# Patient Record
Sex: FEMALE | Race: BLACK OR AFRICAN AMERICAN | NOT HISPANIC OR LATINO | Employment: FULL TIME | URBAN - METROPOLITAN AREA
[De-identification: names, ages, dates, MRNs, and addresses within clinical notes are randomized per-mention and may not be internally consistent; named-entity substitution may affect disease eponyms.]

---

## 2020-10-14 ENCOUNTER — HOSPITAL ENCOUNTER (EMERGENCY)
Facility: HOSPITAL | Age: 30
Discharge: HOME | End: 2020-10-14
Attending: EMERGENCY MEDICINE
Payer: MEDICAID

## 2020-10-14 VITALS
RESPIRATION RATE: 16 BRPM | SYSTOLIC BLOOD PRESSURE: 119 MMHG | HEART RATE: 66 BPM | HEIGHT: 66 IN | DIASTOLIC BLOOD PRESSURE: 67 MMHG | WEIGHT: 183 LBS | BODY MASS INDEX: 29.41 KG/M2 | TEMPERATURE: 99 F | OXYGEN SATURATION: 100 %

## 2020-10-14 DIAGNOSIS — R10.13 EPIGASTRIC ABDOMINAL PAIN: Primary | ICD-10-CM

## 2020-10-14 LAB
ALBUMIN SERPL BCP-MCNC: 4 G/DL (ref 3.5–5.2)
ALP SERPL-CCNC: 56 U/L (ref 55–135)
ALT SERPL W/O P-5'-P-CCNC: 15 U/L (ref 10–44)
ANION GAP SERPL CALC-SCNC: 9 MMOL/L (ref 8–16)
AST SERPL-CCNC: 16 U/L (ref 10–40)
B-HCG UR QL: NEGATIVE
BASOPHILS # BLD AUTO: 0.06 K/UL (ref 0–0.2)
BASOPHILS NFR BLD: 1 % (ref 0–1.9)
BILIRUB SERPL-MCNC: 0.7 MG/DL (ref 0.1–1)
BILIRUB UR QL STRIP: NEGATIVE
BUN SERPL-MCNC: 7 MG/DL (ref 6–20)
CALCIUM SERPL-MCNC: 8.9 MG/DL (ref 8.7–10.5)
CHLORIDE SERPL-SCNC: 105 MMOL/L (ref 95–110)
CLARITY UR: ABNORMAL
CO2 SERPL-SCNC: 24 MMOL/L (ref 23–29)
COLOR UR: YELLOW
CREAT SERPL-MCNC: 0.7 MG/DL (ref 0.5–1.4)
CTP QC/QA: YES
DIFFERENTIAL METHOD: ABNORMAL
EOSINOPHIL # BLD AUTO: 0.2 K/UL (ref 0–0.5)
EOSINOPHIL NFR BLD: 3.7 % (ref 0–8)
ERYTHROCYTE [DISTWIDTH] IN BLOOD BY AUTOMATED COUNT: 11.5 % (ref 11.5–14.5)
EST. GFR  (AFRICAN AMERICAN): >60 ML/MIN/1.73 M^2
EST. GFR  (NON AFRICAN AMERICAN): >60 ML/MIN/1.73 M^2
GLUCOSE SERPL-MCNC: 89 MG/DL (ref 70–110)
GLUCOSE UR QL STRIP: NEGATIVE
HCT VFR BLD AUTO: 39.8 % (ref 37–48.5)
HGB BLD-MCNC: 13.2 G/DL (ref 12–16)
HGB UR QL STRIP: NEGATIVE
IMM GRANULOCYTES # BLD AUTO: 0.02 K/UL (ref 0–0.04)
IMM GRANULOCYTES NFR BLD AUTO: 0.3 % (ref 0–0.5)
KETONES UR QL STRIP: NEGATIVE
LEUKOCYTE ESTERASE UR QL STRIP: NEGATIVE
LIPASE SERPL-CCNC: 24 U/L (ref 4–60)
LYMPHOCYTES # BLD AUTO: 2.3 K/UL (ref 1–4.8)
LYMPHOCYTES NFR BLD: 37.6 % (ref 18–48)
MCH RBC QN AUTO: 32.7 PG (ref 27–31)
MCHC RBC AUTO-ENTMCNC: 33.2 G/DL (ref 32–36)
MCV RBC AUTO: 99 FL (ref 82–98)
MONOCYTES # BLD AUTO: 0.3 K/UL (ref 0.3–1)
MONOCYTES NFR BLD: 5.5 % (ref 4–15)
NEUTROPHILS # BLD AUTO: 3.2 K/UL (ref 1.8–7.7)
NEUTROPHILS NFR BLD: 51.9 % (ref 38–73)
NITRITE UR QL STRIP: NEGATIVE
NRBC BLD-RTO: 0 /100 WBC
PH UR STRIP: 8 [PH] (ref 5–8)
PLATELET # BLD AUTO: 286 K/UL (ref 150–350)
PMV BLD AUTO: 9.7 FL (ref 9.2–12.9)
POTASSIUM SERPL-SCNC: 4.1 MMOL/L (ref 3.5–5.1)
PROT SERPL-MCNC: 7.5 G/DL (ref 6–8.4)
PROT UR QL STRIP: NEGATIVE
RBC # BLD AUTO: 4.04 M/UL (ref 4–5.4)
SODIUM SERPL-SCNC: 138 MMOL/L (ref 136–145)
SP GR UR STRIP: 1.02 (ref 1–1.03)
URN SPEC COLLECT METH UR: ABNORMAL
UROBILINOGEN UR STRIP-ACNC: NEGATIVE EU/DL
WBC # BLD AUTO: 6.19 K/UL (ref 3.9–12.7)

## 2020-10-14 PROCEDURE — 36415 COLL VENOUS BLD VENIPUNCTURE: CPT

## 2020-10-14 PROCEDURE — 85025 COMPLETE CBC W/AUTO DIFF WBC: CPT

## 2020-10-14 PROCEDURE — 81025 URINE PREGNANCY TEST: CPT | Performed by: EMERGENCY MEDICINE

## 2020-10-14 PROCEDURE — 80053 COMPREHEN METABOLIC PANEL: CPT

## 2020-10-14 PROCEDURE — 99285 EMERGENCY DEPT VISIT HI MDM: CPT | Mod: 25

## 2020-10-14 PROCEDURE — 25500020 PHARM REV CODE 255: Performed by: EMERGENCY MEDICINE

## 2020-10-14 PROCEDURE — 96361 HYDRATE IV INFUSION ADD-ON: CPT

## 2020-10-14 PROCEDURE — 81003 URINALYSIS AUTO W/O SCOPE: CPT

## 2020-10-14 PROCEDURE — 25000003 PHARM REV CODE 250: Performed by: EMERGENCY MEDICINE

## 2020-10-14 PROCEDURE — 96375 TX/PRO/DX INJ NEW DRUG ADDON: CPT

## 2020-10-14 PROCEDURE — 83690 ASSAY OF LIPASE: CPT

## 2020-10-14 PROCEDURE — 63600175 PHARM REV CODE 636 W HCPCS: Performed by: EMERGENCY MEDICINE

## 2020-10-14 PROCEDURE — 96374 THER/PROPH/DIAG INJ IV PUSH: CPT

## 2020-10-14 PROCEDURE — C9113 INJ PANTOPRAZOLE SODIUM, VIA: HCPCS | Performed by: EMERGENCY MEDICINE

## 2020-10-14 RX ORDER — HYDROMORPHONE HYDROCHLORIDE 1 MG/ML
1 INJECTION, SOLUTION INTRAMUSCULAR; INTRAVENOUS; SUBCUTANEOUS
Status: COMPLETED | OUTPATIENT
Start: 2020-10-14 | End: 2020-10-14

## 2020-10-14 RX ORDER — SUCRALFATE 1 G/1
1 TABLET ORAL 4 TIMES DAILY
Qty: 100 TABLET | Refills: 1 | Status: ON HOLD | OUTPATIENT
Start: 2020-10-14 | End: 2021-12-07 | Stop reason: HOSPADM

## 2020-10-14 RX ORDER — ONDANSETRON 2 MG/ML
4 INJECTION INTRAMUSCULAR; INTRAVENOUS
Status: COMPLETED | OUTPATIENT
Start: 2020-10-14 | End: 2020-10-14

## 2020-10-14 RX ORDER — PANTOPRAZOLE SODIUM 40 MG/10ML
40 INJECTION, POWDER, LYOPHILIZED, FOR SOLUTION INTRAVENOUS
Status: COMPLETED | OUTPATIENT
Start: 2020-10-14 | End: 2020-10-14

## 2020-10-14 RX ORDER — ONDANSETRON 4 MG/1
4 TABLET, FILM COATED ORAL EVERY 6 HOURS PRN
Qty: 20 TABLET | Refills: 1 | Status: SHIPPED | OUTPATIENT
Start: 2020-10-14 | End: 2021-10-14

## 2020-10-14 RX ORDER — PANTOPRAZOLE SODIUM 20 MG/1
40 TABLET, DELAYED RELEASE ORAL DAILY
Qty: 30 TABLET | Refills: 0 | Status: ON HOLD | OUTPATIENT
Start: 2020-10-14 | End: 2021-12-07 | Stop reason: HOSPADM

## 2020-10-14 RX ADMIN — IOHEXOL 100 ML: 350 INJECTION, SOLUTION INTRAVENOUS at 10:10

## 2020-10-14 RX ADMIN — SODIUM CHLORIDE 1000 ML: 0.9 INJECTION, SOLUTION INTRAVENOUS at 09:10

## 2020-10-14 RX ADMIN — ALUMINUM HYDROXIDE, MAGNESIUM HYDROXIDE, AND SIMETHICONE 50 ML: 200; 200; 20 SUSPENSION ORAL at 09:10

## 2020-10-14 RX ADMIN — PANTOPRAZOLE SODIUM 40 MG: 40 INJECTION, POWDER, FOR SOLUTION INTRAVENOUS at 09:10

## 2020-10-14 RX ADMIN — HYDROMORPHONE HYDROCHLORIDE 1 MG: 1 INJECTION, SOLUTION INTRAMUSCULAR; INTRAVENOUS; SUBCUTANEOUS at 09:10

## 2020-10-14 RX ADMIN — ONDANSETRON HYDROCHLORIDE 4 MG: 2 SOLUTION INTRAMUSCULAR; INTRAVENOUS at 09:10

## 2020-10-14 NOTE — ED PROVIDER NOTES
Encounter Date: 10/14/2020       History     Chief Complaint   Patient presents with    Abdominal Pain     29-year-old female presented emergency department with 4 days of abdominal pain which started as a mild to moderate pain and now 8/10.  Patient's pain started in the epigastric area and now diffusely all over the abdomen.  Patient could not tell me if anything makes it better or worse and describes this as a constant pain in the epigastric and mid abdominal area.  Patient states she has slight pain when she has a bowel movement or when she urinates.  Denies fever or chills or chest pain or shortness of breath however has nausea.        Review of patient's allergies indicates:  No Known Allergies  No past medical history on file.  No past surgical history on file.  No family history on file.  Social History     Tobacco Use    Smoking status: Not on file   Substance Use Topics    Alcohol use: Not on file    Drug use: Not on file     Review of Systems   Constitutional: Negative.    HENT: Negative.    Eyes: Negative.    Respiratory: Negative.    Cardiovascular: Negative.  Negative for chest pain.   Gastrointestinal: Positive for abdominal pain and nausea. Negative for anal bleeding, diarrhea, rectal pain and vomiting.   Endocrine: Negative.    Genitourinary: Negative.    Musculoskeletal: Negative.    Skin: Negative.    Allergic/Immunologic: Negative.    Neurological: Negative.    Hematological: Negative.    Psychiatric/Behavioral: Negative.    All other systems reviewed and are negative.      Physical Exam     Initial Vitals [10/14/20 0841]   BP Pulse Resp Temp SpO2   121/75 81 17 98.8 °F (37.1 °C) 99 %      MAP       --         Physical Exam    Nursing note and vitals reviewed.  Constitutional: She appears well-developed and well-nourished.   HENT:   Head: Normocephalic and atraumatic.   Nose: Nose normal.   Mouth/Throat: Oropharynx is clear and moist.   Eyes: Conjunctivae and EOM are normal. Pupils are equal,  round, and reactive to light.   Neck: Normal range of motion. Neck supple. No thyromegaly present. No tracheal deviation present. No JVD present.   Cardiovascular: Normal rate, regular rhythm, normal heart sounds and intact distal pulses.   No murmur heard.  Pulmonary/Chest: Breath sounds normal. No stridor. No respiratory distress. She has no wheezes. She has no rales.   Abdominal: Soft. Bowel sounds are normal. She exhibits no distension. There is abdominal tenderness. There is no rebound.   Diffuse epigastric and mid abdominal tenderness which is moderate.  No flank tenderness noted   Musculoskeletal: Normal range of motion. No edema.   Neurological: She is alert and oriented to person, place, and time. She has normal strength. No cranial nerve deficit or sensory deficit. GCS score is 15. GCS eye subscore is 4. GCS verbal subscore is 5. GCS motor subscore is 6.   Skin: Skin is warm. Capillary refill takes less than 2 seconds.   Psychiatric: She has a normal mood and affect. Thought content normal.         ED Course   Procedures  Labs Reviewed   CBC W/ AUTO DIFFERENTIAL - Abnormal; Notable for the following components:       Result Value    Mean Corpuscular Volume 99 (*)     Mean Corpuscular Hemoglobin 32.7 (*)     All other components within normal limits   URINALYSIS, REFLEX TO URINE CULTURE - Abnormal; Notable for the following components:    Appearance, UA Hazy (*)     All other components within normal limits    Narrative:     Specimen Source->Urine   COMPREHENSIVE METABOLIC PANEL   LIPASE   POCT URINE PREGNANCY          Imaging Results          CT Abdomen Pelvis With Contrast (Final result)  Result time 10/14/20 10:17:13    Final result by Felipe Mejias MD (10/14/20 10:17:13)                 Narrative:    CT ABDOMEN AND PELVIS WITH CONTRAST    HISTORY: Abdominal pain    CMS MANDATED QUALITY DATA - CT RADIATION  436    All CT scans at this facility utilize dose modulation, iterative  reconstruction,  and/or weight based dosing when appropriate to reduce  radiation dose to as low as reasonably achievable    FINDINGS:    Post infusion images were obtained from the lung bases to the pubic  symphysis. 100 cc nonionic contrast was administered for the  examination.    Comparison is made to October 2016.    The lung bases are unremarkable.    The liver has a normal appearance. The gallbladder and biliary tree  are unremarkable. The spleen, pancreas, and adrenal glands are normal.  The bilateral kidneys are unremarkable. The abdominal aorta is normal  in caliber. Bowel structures are unremarkable. The appendix is normal.  Small fat-containing umbilical hernia is noted. There is no free air  or free fluid.    Images of the pelvis demonstrate sigmoid diverticula without evidence  of diverticulitis. Bowel structures and urinary bladder are within  normal limits. There is no free fluid.        IMPRESSION:      1. No acute intra-abdominal or intrapelvic abnormalities.  2. Sigmoid diverticula without evidence of diverticulitis.    Electronically Signed by Edmond Mejias M.D. on 10/14/2020 10:31 AM                               Medical Decision Making:   Differential Diagnosis:   29-year-old female with the epigastric abdominal pain which is now more diffuse.  Symptoms improved with treatment.  Symptoms consistent with gastritis and has improved discharged with instructions and follow-up.  Patient tolerating oral fluids  Clinical Tests:   Lab Tests: Reviewed  Radiological Study: Reviewed                             Clinical Impression:     ICD-10-CM ICD-9-CM   1. Epigastric abdominal pain  R10.13 789.06                          ED Disposition Condition    Discharge Stable        ED Prescriptions     Medication Sig Dispense Start Date End Date Auth. Provider    pantoprazole (PROTONIX) 20 MG tablet Take 2 tablets (40 mg total) by mouth once daily. 30 tablet 10/14/2020 11/13/2020 Octavia Monzon MD    sucralfate (CARAFATE) 1 gram  tablet Take 1 tablet (1 g total) by mouth 4 (four) times daily. 100 tablet 10/14/2020  Octavia Monzon MD    ondansetron (ZOFRAN) 4 MG tablet Take 1 tablet (4 mg total) by mouth every 6 (six) hours as needed. 20 tablet 10/14/2020 10/14/2021 Octavia Monzon MD        Follow-up Information     Follow up With Specialties Details Why Contact Info    [facility]  In 2 days  [address] [address]  [phone]      Harini Rene MD Gastroenterology   [address] [address]  [phone]                                         Octavia Monzon MD  10/14/20 1120

## 2020-10-14 NOTE — ED NOTES
Pt. Stated she has diffuse ABD that started approx. 4 days ago. Pt. Stated last BM was around midnight 10/13/2020 and seemed normal.

## 2021-01-28 ENCOUNTER — OCCUPATIONAL HEALTH (OUTPATIENT)
Dept: URGENT CARE | Facility: CLINIC | Age: 31
End: 2021-01-28

## 2021-01-28 PROCEDURE — 80305 DRUG TEST PRSMV DIR OPT OBS: CPT | Mod: S$GLB,,, | Performed by: EMERGENCY MEDICINE

## 2021-01-28 PROCEDURE — 86580 PR  TB INTRADERMAL TEST: ICD-10-PCS | Mod: S$GLB,,, | Performed by: EMERGENCY MEDICINE

## 2021-01-28 PROCEDURE — 86580 TB INTRADERMAL TEST: CPT | Mod: S$GLB,,, | Performed by: EMERGENCY MEDICINE

## 2021-01-28 PROCEDURE — 80305 PR NON-DOT DRUG SCREENS: ICD-10-PCS | Mod: S$GLB,,, | Performed by: EMERGENCY MEDICINE

## 2021-05-12 ENCOUNTER — PATIENT MESSAGE (OUTPATIENT)
Dept: RESEARCH | Facility: HOSPITAL | Age: 31
End: 2021-05-12

## 2021-05-25 DIAGNOSIS — R10.2 PAIN PELVIC: Primary | ICD-10-CM

## 2021-05-25 DIAGNOSIS — R10.2 PELVIC PAIN: ICD-10-CM

## 2021-11-16 ENCOUNTER — HOSPITAL ENCOUNTER (EMERGENCY)
Facility: HOSPITAL | Age: 31
Discharge: HOME | End: 2021-11-16
Attending: EMERGENCY MEDICINE
Payer: MEDICAID

## 2021-11-16 VITALS
RESPIRATION RATE: 17 BRPM | DIASTOLIC BLOOD PRESSURE: 75 MMHG | SYSTOLIC BLOOD PRESSURE: 123 MMHG | BODY MASS INDEX: 29.73 KG/M2 | HEART RATE: 93 BPM | HEIGHT: 66 IN | WEIGHT: 185 LBS | TEMPERATURE: 98 F | OXYGEN SATURATION: 100 %

## 2021-11-16 DIAGNOSIS — S60.221A CONTUSION OF RIGHT HAND, INITIAL ENCOUNTER: Primary | ICD-10-CM

## 2021-11-16 LAB
B-HCG UR QL: NEGATIVE
CTP QC/QA: YES

## 2021-11-16 PROCEDURE — 81025 URINE PREGNANCY TEST: CPT | Performed by: NURSE PRACTITIONER

## 2021-11-16 PROCEDURE — 99283 EMERGENCY DEPT VISIT LOW MDM: CPT

## 2021-11-16 PROCEDURE — 25000003 PHARM REV CODE 250: Performed by: NURSE PRACTITIONER

## 2021-11-16 RX ORDER — DICLOFENAC SODIUM 50 MG/1
50 TABLET, DELAYED RELEASE ORAL 3 TIMES DAILY PRN
Qty: 20 TABLET | Refills: 2 | Status: ON HOLD | OUTPATIENT
Start: 2021-11-16 | End: 2021-12-07 | Stop reason: HOSPADM

## 2021-11-16 RX ORDER — NAPROXEN 250 MG/1
500 TABLET ORAL
Status: COMPLETED | OUTPATIENT
Start: 2021-11-16 | End: 2021-11-16

## 2021-11-16 RX ADMIN — NAPROXEN 500 MG: 250 TABLET ORAL at 12:11

## 2021-12-02 ENCOUNTER — HOSPITAL ENCOUNTER (EMERGENCY)
Facility: HOSPITAL | Age: 31
Discharge: HOME | End: 2021-12-02
Attending: EMERGENCY MEDICINE
Payer: MEDICAID

## 2021-12-02 VITALS
SYSTOLIC BLOOD PRESSURE: 125 MMHG | OXYGEN SATURATION: 100 % | RESPIRATION RATE: 16 BRPM | WEIGHT: 184 LBS | HEIGHT: 66 IN | HEART RATE: 79 BPM | DIASTOLIC BLOOD PRESSURE: 75 MMHG | BODY MASS INDEX: 29.57 KG/M2 | TEMPERATURE: 99 F

## 2021-12-02 DIAGNOSIS — R05.9 COUGH: Primary | ICD-10-CM

## 2021-12-02 LAB
BACTERIA #/AREA URNS HPF: NEGATIVE /HPF
BILIRUB UR QL STRIP: NEGATIVE
CLARITY UR: CLEAR
COLOR UR: YELLOW
GLUCOSE UR QL STRIP: NEGATIVE
HGB UR QL STRIP: NEGATIVE
HYALINE CASTS #/AREA URNS LPF: 8 /LPF
INFLUENZA A, MOLECULAR: NEGATIVE
INFLUENZA B, MOLECULAR: NEGATIVE
KETONES UR QL STRIP: NEGATIVE
LEUKOCYTE ESTERASE UR QL STRIP: NEGATIVE
MICROSCOPIC COMMENT: ABNORMAL
NITRITE UR QL STRIP: NEGATIVE
PH UR STRIP: 6 [PH] (ref 5–8)
PROT UR QL STRIP: ABNORMAL
RBC #/AREA URNS HPF: 2 /HPF (ref 0–4)
SARS-COV-2 RDRP RESP QL NAA+PROBE: NEGATIVE
SP GR UR STRIP: >1.03 (ref 1–1.03)
SPECIMEN SOURCE: NORMAL
SQUAMOUS #/AREA URNS HPF: 6 /HPF
URN SPEC COLLECT METH UR: ABNORMAL
UROBILINOGEN UR STRIP-ACNC: ABNORMAL EU/DL
WBC #/AREA URNS HPF: 2 /HPF (ref 0–5)

## 2021-12-02 PROCEDURE — 87502 INFLUENZA DNA AMP PROBE: CPT | Performed by: EMERGENCY MEDICINE

## 2021-12-02 PROCEDURE — U0002 COVID-19 LAB TEST NON-CDC: HCPCS | Performed by: EMERGENCY MEDICINE

## 2021-12-02 PROCEDURE — 96372 THER/PROPH/DIAG INJ SC/IM: CPT

## 2021-12-02 PROCEDURE — 81001 URINALYSIS AUTO W/SCOPE: CPT | Performed by: EMERGENCY MEDICINE

## 2021-12-02 PROCEDURE — 63600175 PHARM REV CODE 636 W HCPCS: Performed by: EMERGENCY MEDICINE

## 2021-12-02 PROCEDURE — 99284 EMERGENCY DEPT VISIT MOD MDM: CPT

## 2021-12-02 RX ORDER — AZITHROMYCIN 250 MG/1
250 TABLET, FILM COATED ORAL DAILY
Qty: 6 TABLET | Refills: 0 | Status: ON HOLD | OUTPATIENT
Start: 2021-12-02 | End: 2021-12-07 | Stop reason: HOSPADM

## 2021-12-02 RX ORDER — DEXAMETHASONE SODIUM PHOSPHATE 4 MG/ML
8 INJECTION, SOLUTION INTRA-ARTICULAR; INTRALESIONAL; INTRAMUSCULAR; INTRAVENOUS; SOFT TISSUE
Status: COMPLETED | OUTPATIENT
Start: 2021-12-02 | End: 2021-12-02

## 2021-12-02 RX ADMIN — DEXAMETHASONE SODIUM PHOSPHATE 8 MG: 4 INJECTION, SOLUTION INTRA-ARTICULAR; INTRALESIONAL; INTRAMUSCULAR; INTRAVENOUS; SOFT TISSUE at 12:12

## 2021-12-05 ENCOUNTER — HOSPITAL ENCOUNTER (INPATIENT)
Facility: HOSPITAL | Age: 31
LOS: 2 days | Discharge: HOME | DRG: 392 | End: 2021-12-07
Attending: EMERGENCY MEDICINE | Admitting: INTERNAL MEDICINE
Payer: MEDICAID

## 2021-12-05 DIAGNOSIS — K57.92 ACUTE DIVERTICULITIS: ICD-10-CM

## 2021-12-05 DIAGNOSIS — R10.9 ABDOMINAL PAIN: ICD-10-CM

## 2021-12-05 DIAGNOSIS — K57.92 DIVERTICULITIS: Primary | ICD-10-CM

## 2021-12-05 LAB
ALBUMIN SERPL BCP-MCNC: 3.7 G/DL (ref 3.5–5.2)
ALBUMIN SERPL BCP-MCNC: 3.7 G/DL (ref 3.5–5.2)
ALP SERPL-CCNC: 46 U/L (ref 55–135)
ALP SERPL-CCNC: 46 U/L (ref 55–135)
ALT SERPL W/O P-5'-P-CCNC: 22 U/L (ref 10–44)
ALT SERPL W/O P-5'-P-CCNC: 22 U/L (ref 10–44)
ANION GAP SERPL CALC-SCNC: 8 MMOL/L (ref 8–16)
ANION GAP SERPL CALC-SCNC: 8 MMOL/L (ref 8–16)
AST SERPL-CCNC: 19 U/L (ref 10–40)
AST SERPL-CCNC: 19 U/L (ref 10–40)
B-HCG UR QL: NEGATIVE
BASOPHILS # BLD AUTO: 0.02 K/UL (ref 0–0.2)
BASOPHILS # BLD AUTO: 0.02 K/UL (ref 0–0.2)
BASOPHILS NFR BLD: 0.2 % (ref 0–1.9)
BASOPHILS NFR BLD: 0.2 % (ref 0–1.9)
BILIRUB SERPL-MCNC: 0.6 MG/DL (ref 0.1–1)
BILIRUB SERPL-MCNC: 0.6 MG/DL (ref 0.1–1)
BILIRUB UR QL STRIP: NEGATIVE
BUN SERPL-MCNC: 7 MG/DL (ref 6–20)
BUN SERPL-MCNC: 7 MG/DL (ref 6–20)
CALCIUM SERPL-MCNC: 8.4 MG/DL (ref 8.7–10.5)
CALCIUM SERPL-MCNC: 8.4 MG/DL (ref 8.7–10.5)
CHLORIDE SERPL-SCNC: 108 MMOL/L (ref 95–110)
CHLORIDE SERPL-SCNC: 108 MMOL/L (ref 95–110)
CLARITY UR: CLEAR
CO2 SERPL-SCNC: 23 MMOL/L (ref 23–29)
CO2 SERPL-SCNC: 23 MMOL/L (ref 23–29)
COLOR UR: YELLOW
CREAT SERPL-MCNC: 0.6 MG/DL (ref 0.5–1.4)
CREAT SERPL-MCNC: 0.6 MG/DL (ref 0.5–1.4)
CTP QC/QA: YES
DIFFERENTIAL METHOD: ABNORMAL
DIFFERENTIAL METHOD: ABNORMAL
EOSINOPHIL # BLD AUTO: 0.1 K/UL (ref 0–0.5)
EOSINOPHIL # BLD AUTO: 0.1 K/UL (ref 0–0.5)
EOSINOPHIL NFR BLD: 1.3 % (ref 0–8)
EOSINOPHIL NFR BLD: 1.3 % (ref 0–8)
ERYTHROCYTE [DISTWIDTH] IN BLOOD BY AUTOMATED COUNT: 12.1 % (ref 11.5–14.5)
ERYTHROCYTE [DISTWIDTH] IN BLOOD BY AUTOMATED COUNT: 12.1 % (ref 11.5–14.5)
EST. GFR  (AFRICAN AMERICAN): >60 ML/MIN/1.73 M^2
EST. GFR  (AFRICAN AMERICAN): >60 ML/MIN/1.73 M^2
EST. GFR  (NON AFRICAN AMERICAN): >60 ML/MIN/1.73 M^2
EST. GFR  (NON AFRICAN AMERICAN): >60 ML/MIN/1.73 M^2
GLUCOSE SERPL-MCNC: 123 MG/DL (ref 70–110)
GLUCOSE SERPL-MCNC: 95 MG/DL (ref 70–110)
GLUCOSE SERPL-MCNC: 95 MG/DL (ref 70–110)
GLUCOSE UR QL STRIP: NEGATIVE
HCT VFR BLD AUTO: 38.2 % (ref 37–48.5)
HCT VFR BLD AUTO: 38.2 % (ref 37–48.5)
HGB BLD-MCNC: 12.5 G/DL (ref 12–16)
HGB BLD-MCNC: 12.5 G/DL (ref 12–16)
HGB UR QL STRIP: NEGATIVE
IMM GRANULOCYTES # BLD AUTO: 0.03 K/UL (ref 0–0.04)
IMM GRANULOCYTES # BLD AUTO: 0.03 K/UL (ref 0–0.04)
IMM GRANULOCYTES NFR BLD AUTO: 0.4 % (ref 0–0.5)
IMM GRANULOCYTES NFR BLD AUTO: 0.4 % (ref 0–0.5)
KETONES UR QL STRIP: NEGATIVE
LACTATE SERPL-SCNC: 0.6 MMOL/L (ref 0.5–1.9)
LACTATE SERPL-SCNC: 0.7 MMOL/L (ref 0.5–1.9)
LEUKOCYTE ESTERASE UR QL STRIP: NEGATIVE
LIPASE SERPL-CCNC: 21 U/L (ref 4–60)
LIPASE SERPL-CCNC: 21 U/L (ref 4–60)
LYMPHOCYTES # BLD AUTO: 2.2 K/UL (ref 1–4.8)
LYMPHOCYTES # BLD AUTO: 2.2 K/UL (ref 1–4.8)
LYMPHOCYTES NFR BLD: 26.3 % (ref 18–48)
LYMPHOCYTES NFR BLD: 26.3 % (ref 18–48)
MCH RBC QN AUTO: 32.5 PG (ref 27–31)
MCH RBC QN AUTO: 32.5 PG (ref 27–31)
MCHC RBC AUTO-ENTMCNC: 32.7 G/DL (ref 32–36)
MCHC RBC AUTO-ENTMCNC: 32.7 G/DL (ref 32–36)
MCV RBC AUTO: 99 FL (ref 82–98)
MCV RBC AUTO: 99 FL (ref 82–98)
MONOCYTES # BLD AUTO: 0.5 K/UL (ref 0.3–1)
MONOCYTES # BLD AUTO: 0.5 K/UL (ref 0.3–1)
MONOCYTES NFR BLD: 5.8 % (ref 4–15)
MONOCYTES NFR BLD: 5.8 % (ref 4–15)
NEUTROPHILS # BLD AUTO: 5.5 K/UL (ref 1.8–7.7)
NEUTROPHILS # BLD AUTO: 5.5 K/UL (ref 1.8–7.7)
NEUTROPHILS NFR BLD: 66 % (ref 38–73)
NEUTROPHILS NFR BLD: 66 % (ref 38–73)
NITRITE UR QL STRIP: NEGATIVE
NRBC BLD-RTO: 0 /100 WBC
NRBC BLD-RTO: 0 /100 WBC
PH UR STRIP: 7 [PH] (ref 5–8)
PLATELET # BLD AUTO: 272 K/UL (ref 150–450)
PLATELET # BLD AUTO: 272 K/UL (ref 150–450)
PMV BLD AUTO: 8.9 FL (ref 9.2–12.9)
PMV BLD AUTO: 8.9 FL (ref 9.2–12.9)
POTASSIUM SERPL-SCNC: 4.2 MMOL/L (ref 3.5–5.1)
POTASSIUM SERPL-SCNC: 4.2 MMOL/L (ref 3.5–5.1)
PROCALCITONIN SERPL IA-MCNC: <0.05 NG/ML (ref 0–0.5)
PROT SERPL-MCNC: 7.1 G/DL (ref 6–8.4)
PROT SERPL-MCNC: 7.1 G/DL (ref 6–8.4)
PROT UR QL STRIP: NEGATIVE
RBC # BLD AUTO: 3.85 M/UL (ref 4–5.4)
RBC # BLD AUTO: 3.85 M/UL (ref 4–5.4)
SARS-COV-2 RDRP RESP QL NAA+PROBE: NEGATIVE
SODIUM SERPL-SCNC: 139 MMOL/L (ref 136–145)
SODIUM SERPL-SCNC: 139 MMOL/L (ref 136–145)
SP GR UR STRIP: 1.01 (ref 1–1.03)
TROPONIN I SERPL DL<=0.01 NG/ML-MCNC: <0.03 NG/ML
URN SPEC COLLECT METH UR: NORMAL
UROBILINOGEN UR STRIP-ACNC: NEGATIVE EU/DL
WBC # BLD AUTO: 8.39 K/UL (ref 3.9–12.7)
WBC # BLD AUTO: 8.39 K/UL (ref 3.9–12.7)

## 2021-12-05 PROCEDURE — 96361 HYDRATE IV INFUSION ADD-ON: CPT

## 2021-12-05 PROCEDURE — 96375 TX/PRO/DX INJ NEW DRUG ADDON: CPT

## 2021-12-05 PROCEDURE — 81025 URINE PREGNANCY TEST: CPT | Performed by: EMERGENCY MEDICINE

## 2021-12-05 PROCEDURE — 25000003 PHARM REV CODE 250: Performed by: INTERNAL MEDICINE

## 2021-12-05 PROCEDURE — 99285 EMERGENCY DEPT VISIT HI MDM: CPT | Mod: 25

## 2021-12-05 PROCEDURE — 12000002 HC ACUTE/MED SURGE SEMI-PRIVATE ROOM

## 2021-12-05 PROCEDURE — 80053 COMPREHEN METABOLIC PANEL: CPT | Performed by: EMERGENCY MEDICINE

## 2021-12-05 PROCEDURE — 25500020 PHARM REV CODE 255: Performed by: EMERGENCY MEDICINE

## 2021-12-05 PROCEDURE — 96374 THER/PROPH/DIAG INJ IV PUSH: CPT

## 2021-12-05 PROCEDURE — C9113 INJ PANTOPRAZOLE SODIUM, VIA: HCPCS | Performed by: EMERGENCY MEDICINE

## 2021-12-05 PROCEDURE — 84145 PROCALCITONIN (PCT): CPT | Performed by: EMERGENCY MEDICINE

## 2021-12-05 PROCEDURE — 96374 THER/PROPH/DIAG INJ IV PUSH: CPT | Mod: 59

## 2021-12-05 PROCEDURE — 63600175 PHARM REV CODE 636 W HCPCS: Performed by: INTERNAL MEDICINE

## 2021-12-05 PROCEDURE — 87040 BLOOD CULTURE FOR BACTERIA: CPT | Mod: 59 | Performed by: EMERGENCY MEDICINE

## 2021-12-05 PROCEDURE — 93005 ELECTROCARDIOGRAM TRACING: CPT | Performed by: INTERNAL MEDICINE

## 2021-12-05 PROCEDURE — 81003 URINALYSIS AUTO W/O SCOPE: CPT | Performed by: EMERGENCY MEDICINE

## 2021-12-05 PROCEDURE — 63600175 PHARM REV CODE 636 W HCPCS: Performed by: EMERGENCY MEDICINE

## 2021-12-05 PROCEDURE — 84484 ASSAY OF TROPONIN QUANT: CPT | Performed by: EMERGENCY MEDICINE

## 2021-12-05 PROCEDURE — 96376 TX/PRO/DX INJ SAME DRUG ADON: CPT

## 2021-12-05 PROCEDURE — 83690 ASSAY OF LIPASE: CPT | Performed by: EMERGENCY MEDICINE

## 2021-12-05 PROCEDURE — U0002 COVID-19 LAB TEST NON-CDC: HCPCS | Performed by: EMERGENCY MEDICINE

## 2021-12-05 PROCEDURE — 93010 ELECTROCARDIOGRAM REPORT: CPT | Mod: ,,, | Performed by: INTERNAL MEDICINE

## 2021-12-05 PROCEDURE — 93010 EKG 12-LEAD: ICD-10-PCS | Mod: ,,, | Performed by: INTERNAL MEDICINE

## 2021-12-05 PROCEDURE — 25000003 PHARM REV CODE 250: Performed by: EMERGENCY MEDICINE

## 2021-12-05 PROCEDURE — 36415 COLL VENOUS BLD VENIPUNCTURE: CPT | Performed by: EMERGENCY MEDICINE

## 2021-12-05 PROCEDURE — G0378 HOSPITAL OBSERVATION PER HR: HCPCS

## 2021-12-05 PROCEDURE — 85025 COMPLETE CBC W/AUTO DIFF WBC: CPT | Performed by: EMERGENCY MEDICINE

## 2021-12-05 PROCEDURE — S0030 INJECTION, METRONIDAZOLE: HCPCS | Performed by: INTERNAL MEDICINE

## 2021-12-05 PROCEDURE — 83605 ASSAY OF LACTIC ACID: CPT | Performed by: EMERGENCY MEDICINE

## 2021-12-05 RX ORDER — ONDANSETRON 2 MG/ML
8 INJECTION INTRAMUSCULAR; INTRAVENOUS
Status: COMPLETED | OUTPATIENT
Start: 2021-12-05 | End: 2021-12-05

## 2021-12-05 RX ORDER — METRONIDAZOLE 500 MG/100ML
500 INJECTION, SOLUTION INTRAVENOUS
Status: DISPENSED | OUTPATIENT
Start: 2021-12-05 | End: 2021-12-05

## 2021-12-05 RX ORDER — LEVOFLOXACIN 5 MG/ML
500 INJECTION, SOLUTION INTRAVENOUS
Status: DISCONTINUED | OUTPATIENT
Start: 2021-12-06 | End: 2021-12-07 | Stop reason: HOSPADM

## 2021-12-05 RX ORDER — SODIUM CHLORIDE 9 MG/ML
INJECTION, SOLUTION INTRAVENOUS CONTINUOUS
Status: DISCONTINUED | OUTPATIENT
Start: 2021-12-05 | End: 2021-12-06

## 2021-12-05 RX ORDER — LEVOFLOXACIN 5 MG/ML
500 INJECTION, SOLUTION INTRAVENOUS
Status: COMPLETED | OUTPATIENT
Start: 2021-12-05 | End: 2021-12-05

## 2021-12-05 RX ORDER — AMOXICILLIN 250 MG
1 CAPSULE ORAL 2 TIMES DAILY
Status: DISCONTINUED | OUTPATIENT
Start: 2021-12-05 | End: 2021-12-07 | Stop reason: HOSPADM

## 2021-12-05 RX ORDER — HYDROMORPHONE HYDROCHLORIDE 1 MG/ML
1 INJECTION, SOLUTION INTRAMUSCULAR; INTRAVENOUS; SUBCUTANEOUS
Status: DISCONTINUED | OUTPATIENT
Start: 2021-12-05 | End: 2021-12-06

## 2021-12-05 RX ORDER — ONDANSETRON 2 MG/ML
4 INJECTION INTRAMUSCULAR; INTRAVENOUS EVERY 6 HOURS PRN
Status: DISCONTINUED | OUTPATIENT
Start: 2021-12-05 | End: 2021-12-07 | Stop reason: HOSPADM

## 2021-12-05 RX ORDER — HYDROMORPHONE HYDROCHLORIDE 1 MG/ML
1 INJECTION, SOLUTION INTRAMUSCULAR; INTRAVENOUS; SUBCUTANEOUS
Status: COMPLETED | OUTPATIENT
Start: 2021-12-05 | End: 2021-12-05

## 2021-12-05 RX ORDER — METRONIDAZOLE 500 MG/100ML
500 INJECTION, SOLUTION INTRAVENOUS
Status: DISCONTINUED | OUTPATIENT
Start: 2021-12-05 | End: 2021-12-07 | Stop reason: HOSPADM

## 2021-12-05 RX ORDER — PANTOPRAZOLE SODIUM 40 MG/10ML
40 INJECTION, POWDER, LYOPHILIZED, FOR SOLUTION INTRAVENOUS
Status: COMPLETED | OUTPATIENT
Start: 2021-12-05 | End: 2021-12-05

## 2021-12-05 RX ORDER — PANTOPRAZOLE SODIUM 40 MG/1
40 TABLET, DELAYED RELEASE ORAL DAILY
Status: DISCONTINUED | OUTPATIENT
Start: 2021-12-05 | End: 2021-12-07 | Stop reason: HOSPADM

## 2021-12-05 RX ORDER — MEDROXYPROGESTERONE ACETATE 150 MG/ML
150 INJECTION, SUSPENSION INTRAMUSCULAR
COMMUNITY

## 2021-12-05 RX ADMIN — ONDANSETRON 8 MG: 2 INJECTION INTRAMUSCULAR; INTRAVENOUS at 08:12

## 2021-12-05 RX ADMIN — PANTOPRAZOLE SODIUM 40 MG: 40 INJECTION, POWDER, FOR SOLUTION INTRAVENOUS at 08:12

## 2021-12-05 RX ADMIN — HYDROMORPHONE HYDROCHLORIDE 1 MG: 1 INJECTION, SOLUTION INTRAMUSCULAR; INTRAVENOUS; SUBCUTANEOUS at 08:12

## 2021-12-05 RX ADMIN — SODIUM CHLORIDE 1000 ML: 0.9 INJECTION, SOLUTION INTRAVENOUS at 08:12

## 2021-12-05 RX ADMIN — SODIUM CHLORIDE, SODIUM LACTATE, POTASSIUM CHLORIDE, AND CALCIUM CHLORIDE 2505 ML: .6; .31; .03; .02 INJECTION, SOLUTION INTRAVENOUS at 12:12

## 2021-12-05 RX ADMIN — SODIUM CHLORIDE: 0.9 INJECTION, SOLUTION INTRAVENOUS at 12:12

## 2021-12-05 RX ADMIN — LEVOFLOXACIN 500 MG: 500 INJECTION, SOLUTION INTRAVENOUS at 05:12

## 2021-12-05 RX ADMIN — IOHEXOL 100 ML: 350 INJECTION, SOLUTION INTRAVENOUS at 10:12

## 2021-12-05 RX ADMIN — METRONIDAZOLE 500 MG: 500 INJECTION, SOLUTION INTRAVENOUS at 11:12

## 2021-12-05 RX ADMIN — PANTOPRAZOLE SODIUM 40 MG: 40 TABLET, DELAYED RELEASE ORAL at 05:12

## 2021-12-05 RX ADMIN — DICYCLOMINE HYDROCHLORIDE 50 ML: 10 SOLUTION ORAL at 10:12

## 2021-12-05 RX ADMIN — SENNOSIDES AND DOCUSATE SODIUM 1 TABLET: 50; 8.6 TABLET ORAL at 11:12

## 2021-12-05 RX ADMIN — SENNOSIDES AND DOCUSATE SODIUM 1 TABLET: 50; 8.6 TABLET ORAL at 05:12

## 2021-12-05 RX ADMIN — METRONIDAZOLE 500 MG: 500 INJECTION, SOLUTION INTRAVENOUS at 01:12

## 2021-12-05 RX ADMIN — HYDROMORPHONE HYDROCHLORIDE 1 MG: 1 INJECTION, SOLUTION INTRAMUSCULAR; INTRAVENOUS; SUBCUTANEOUS at 01:12

## 2021-12-06 LAB
ALBUMIN SERPL BCP-MCNC: 3.4 G/DL (ref 3.5–5.2)
ALP SERPL-CCNC: 43 U/L (ref 55–135)
ALT SERPL W/O P-5'-P-CCNC: 18 U/L (ref 10–44)
ANION GAP SERPL CALC-SCNC: 6 MMOL/L (ref 8–16)
AST SERPL-CCNC: 14 U/L (ref 10–40)
BILIRUB SERPL-MCNC: 0.8 MG/DL (ref 0.1–1)
BUN SERPL-MCNC: 7 MG/DL (ref 6–20)
CALCIUM SERPL-MCNC: 8.6 MG/DL (ref 8.7–10.5)
CHLORIDE SERPL-SCNC: 107 MMOL/L (ref 95–110)
CO2 SERPL-SCNC: 23 MMOL/L (ref 23–29)
CREAT SERPL-MCNC: 0.6 MG/DL (ref 0.5–1.4)
ERYTHROCYTE [DISTWIDTH] IN BLOOD BY AUTOMATED COUNT: 11.9 % (ref 11.5–14.5)
EST. GFR  (AFRICAN AMERICAN): >60 ML/MIN/1.73 M^2
EST. GFR  (NON AFRICAN AMERICAN): >60 ML/MIN/1.73 M^2
GLUCOSE SERPL-MCNC: 100 MG/DL (ref 70–110)
HCT VFR BLD AUTO: 36.5 % (ref 37–48.5)
HGB BLD-MCNC: 12.1 G/DL (ref 12–16)
MCH RBC QN AUTO: 32.7 PG (ref 27–31)
MCHC RBC AUTO-ENTMCNC: 33.2 G/DL (ref 32–36)
MCV RBC AUTO: 99 FL (ref 82–98)
PLATELET # BLD AUTO: 258 K/UL (ref 150–450)
PMV BLD AUTO: 9 FL (ref 9.2–12.9)
POTASSIUM SERPL-SCNC: 4.3 MMOL/L (ref 3.5–5.1)
PROT SERPL-MCNC: 7 G/DL (ref 6–8.4)
RBC # BLD AUTO: 3.7 M/UL (ref 4–5.4)
SODIUM SERPL-SCNC: 136 MMOL/L (ref 136–145)
WBC # BLD AUTO: 6.97 K/UL (ref 3.9–12.7)

## 2021-12-06 PROCEDURE — 85027 COMPLETE CBC AUTOMATED: CPT | Performed by: INTERNAL MEDICINE

## 2021-12-06 PROCEDURE — S0030 INJECTION, METRONIDAZOLE: HCPCS | Performed by: INTERNAL MEDICINE

## 2021-12-06 PROCEDURE — 80053 COMPREHEN METABOLIC PANEL: CPT | Performed by: INTERNAL MEDICINE

## 2021-12-06 PROCEDURE — 63600175 PHARM REV CODE 636 W HCPCS: Performed by: INTERNAL MEDICINE

## 2021-12-06 PROCEDURE — 25000003 PHARM REV CODE 250: Performed by: INTERNAL MEDICINE

## 2021-12-06 PROCEDURE — 36415 COLL VENOUS BLD VENIPUNCTURE: CPT | Performed by: INTERNAL MEDICINE

## 2021-12-06 PROCEDURE — 12000002 HC ACUTE/MED SURGE SEMI-PRIVATE ROOM

## 2021-12-06 PROCEDURE — 96376 TX/PRO/DX INJ SAME DRUG ADON: CPT

## 2021-12-06 RX ORDER — HYDROMORPHONE HYDROCHLORIDE 1 MG/ML
1 INJECTION, SOLUTION INTRAMUSCULAR; INTRAVENOUS; SUBCUTANEOUS
Status: DISCONTINUED | OUTPATIENT
Start: 2021-12-06 | End: 2021-12-07 | Stop reason: HOSPADM

## 2021-12-06 RX ORDER — ACETAMINOPHEN 500 MG
1000 TABLET ORAL EVERY 6 HOURS PRN
Status: DISCONTINUED | OUTPATIENT
Start: 2021-12-06 | End: 2021-12-07 | Stop reason: HOSPADM

## 2021-12-06 RX ORDER — HYDROCODONE BITARTRATE AND ACETAMINOPHEN 5; 325 MG/1; MG/1
1 TABLET ORAL EVERY 6 HOURS PRN
Status: DISCONTINUED | OUTPATIENT
Start: 2021-12-06 | End: 2021-12-07 | Stop reason: HOSPADM

## 2021-12-06 RX ADMIN — SENNOSIDES AND DOCUSATE SODIUM 1 TABLET: 50; 8.6 TABLET ORAL at 10:12

## 2021-12-06 RX ADMIN — METRONIDAZOLE 500 MG: 500 INJECTION, SOLUTION INTRAVENOUS at 09:12

## 2021-12-06 RX ADMIN — SENNOSIDES AND DOCUSATE SODIUM 1 TABLET: 50; 8.6 TABLET ORAL at 09:12

## 2021-12-06 RX ADMIN — ACETAMINOPHEN 1000 MG: 500 TABLET, FILM COATED ORAL at 09:12

## 2021-12-06 RX ADMIN — LEVOFLOXACIN 500 MG: 500 INJECTION, SOLUTION INTRAVENOUS at 12:12

## 2021-12-06 RX ADMIN — METRONIDAZOLE 500 MG: 500 INJECTION, SOLUTION INTRAVENOUS at 01:12

## 2021-12-06 RX ADMIN — PANTOPRAZOLE SODIUM 40 MG: 40 TABLET, DELAYED RELEASE ORAL at 05:12

## 2021-12-06 RX ADMIN — METRONIDAZOLE 500 MG: 500 INJECTION, SOLUTION INTRAVENOUS at 05:12

## 2021-12-07 VITALS
HEIGHT: 66 IN | RESPIRATION RATE: 18 BRPM | TEMPERATURE: 99 F | BODY MASS INDEX: 29.57 KG/M2 | WEIGHT: 184 LBS | DIASTOLIC BLOOD PRESSURE: 77 MMHG | HEART RATE: 94 BPM | SYSTOLIC BLOOD PRESSURE: 113 MMHG | OXYGEN SATURATION: 97 %

## 2021-12-07 PROBLEM — K57.92 ACUTE DIVERTICULITIS: Status: RESOLVED | Noted: 2021-12-05 | Resolved: 2021-12-07

## 2021-12-07 LAB
ALBUMIN SERPL BCP-MCNC: 3.8 G/DL (ref 3.5–5.2)
ALP SERPL-CCNC: 46 U/L (ref 55–135)
ALT SERPL W/O P-5'-P-CCNC: 19 U/L (ref 10–44)
ANION GAP SERPL CALC-SCNC: 12 MMOL/L (ref 8–16)
AST SERPL-CCNC: 15 U/L (ref 10–40)
BILIRUB SERPL-MCNC: 0.7 MG/DL (ref 0.1–1)
BUN SERPL-MCNC: 7 MG/DL (ref 6–20)
CALCIUM SERPL-MCNC: 9.2 MG/DL (ref 8.7–10.5)
CHLORIDE SERPL-SCNC: 107 MMOL/L (ref 95–110)
CO2 SERPL-SCNC: 21 MMOL/L (ref 23–29)
CREAT SERPL-MCNC: 0.7 MG/DL (ref 0.5–1.4)
ERYTHROCYTE [DISTWIDTH] IN BLOOD BY AUTOMATED COUNT: 11.6 % (ref 11.5–14.5)
EST. GFR  (AFRICAN AMERICAN): >60 ML/MIN/1.73 M^2
EST. GFR  (NON AFRICAN AMERICAN): >60 ML/MIN/1.73 M^2
GLUCOSE SERPL-MCNC: 98 MG/DL (ref 70–110)
HCT VFR BLD AUTO: 38.5 % (ref 37–48.5)
HGB BLD-MCNC: 12.7 G/DL (ref 12–16)
MCH RBC QN AUTO: 32.6 PG (ref 27–31)
MCHC RBC AUTO-ENTMCNC: 33 G/DL (ref 32–36)
MCV RBC AUTO: 99 FL (ref 82–98)
PLATELET # BLD AUTO: 298 K/UL (ref 150–450)
PMV BLD AUTO: 9.4 FL (ref 9.2–12.9)
POTASSIUM SERPL-SCNC: 4.1 MMOL/L (ref 3.5–5.1)
PROT SERPL-MCNC: 7.4 G/DL (ref 6–8.4)
RBC # BLD AUTO: 3.89 M/UL (ref 4–5.4)
SODIUM SERPL-SCNC: 140 MMOL/L (ref 136–145)
WBC # BLD AUTO: 5.24 K/UL (ref 3.9–12.7)

## 2021-12-07 PROCEDURE — 36415 COLL VENOUS BLD VENIPUNCTURE: CPT | Performed by: INTERNAL MEDICINE

## 2021-12-07 PROCEDURE — 85027 COMPLETE CBC AUTOMATED: CPT | Performed by: INTERNAL MEDICINE

## 2021-12-07 PROCEDURE — 63600175 PHARM REV CODE 636 W HCPCS: Performed by: INTERNAL MEDICINE

## 2021-12-07 PROCEDURE — S0030 INJECTION, METRONIDAZOLE: HCPCS | Performed by: INTERNAL MEDICINE

## 2021-12-07 PROCEDURE — 25000003 PHARM REV CODE 250: Performed by: INTERNAL MEDICINE

## 2021-12-07 PROCEDURE — 80053 COMPREHEN METABOLIC PANEL: CPT | Performed by: INTERNAL MEDICINE

## 2021-12-07 RX ORDER — HYDROCODONE BITARTRATE AND ACETAMINOPHEN 5; 325 MG/1; MG/1
1 TABLET ORAL EVERY 8 HOURS PRN
Qty: 12 TABLET | Refills: 0 | OUTPATIENT
Start: 2021-12-07 | End: 2022-06-07

## 2021-12-07 RX ORDER — AMOXICILLIN 250 MG
1 CAPSULE ORAL 2 TIMES DAILY
Qty: 30 TABLET | Refills: 0 | Status: SHIPPED | OUTPATIENT
Start: 2021-12-07 | End: 2021-12-22

## 2021-12-07 RX ORDER — METRONIDAZOLE 500 MG/1
500 TABLET ORAL EVERY 8 HOURS
Qty: 30 TABLET | Refills: 0 | Status: SHIPPED | OUTPATIENT
Start: 2021-12-07 | End: 2021-12-17

## 2021-12-07 RX ORDER — CIPROFLOXACIN 500 MG/1
500 TABLET ORAL EVERY 12 HOURS
Qty: 20 TABLET | Refills: 0 | Status: SHIPPED | OUTPATIENT
Start: 2021-12-07 | End: 2021-12-17

## 2021-12-07 RX ADMIN — LEVOFLOXACIN 500 MG: 500 INJECTION, SOLUTION INTRAVENOUS at 12:12

## 2021-12-07 RX ADMIN — METRONIDAZOLE 500 MG: 500 INJECTION, SOLUTION INTRAVENOUS at 05:12

## 2021-12-07 RX ADMIN — PANTOPRAZOLE SODIUM 40 MG: 40 TABLET, DELAYED RELEASE ORAL at 05:12

## 2021-12-10 LAB
BACTERIA BLD CULT: NORMAL
BACTERIA BLD CULT: NORMAL

## 2022-01-13 ENCOUNTER — HOSPITAL ENCOUNTER (EMERGENCY)
Facility: HOSPITAL | Age: 32
Discharge: HOME | End: 2022-01-13
Attending: EMERGENCY MEDICINE
Payer: MEDICAID

## 2022-01-13 VITALS
TEMPERATURE: 99 F | RESPIRATION RATE: 21 BRPM | WEIGHT: 209 LBS | SYSTOLIC BLOOD PRESSURE: 140 MMHG | DIASTOLIC BLOOD PRESSURE: 80 MMHG | HEIGHT: 66 IN | HEART RATE: 102 BPM | BODY MASS INDEX: 33.59 KG/M2 | OXYGEN SATURATION: 99 %

## 2022-01-13 DIAGNOSIS — J06.9 VIRAL URI: Primary | ICD-10-CM

## 2022-01-13 DIAGNOSIS — R51.9 ACUTE NONINTRACTABLE HEADACHE, UNSPECIFIED HEADACHE TYPE: ICD-10-CM

## 2022-01-13 LAB
INFLUENZA A, MOLECULAR: NEGATIVE
INFLUENZA B, MOLECULAR: NEGATIVE
SARS-COV-2 RDRP RESP QL NAA+PROBE: NEGATIVE
SPECIMEN SOURCE: NORMAL

## 2022-01-13 PROCEDURE — 99282 EMERGENCY DEPT VISIT SF MDM: CPT

## 2022-01-13 PROCEDURE — 25000003 PHARM REV CODE 250: Performed by: EMERGENCY MEDICINE

## 2022-01-13 PROCEDURE — U0002 COVID-19 LAB TEST NON-CDC: HCPCS | Performed by: EMERGENCY MEDICINE

## 2022-01-13 PROCEDURE — 87502 INFLUENZA DNA AMP PROBE: CPT | Performed by: EMERGENCY MEDICINE

## 2022-01-13 RX ORDER — BUTALBITAL, ACETAMINOPHEN AND CAFFEINE 50; 325; 40 MG/1; MG/1; MG/1
1 TABLET ORAL EVERY 6 HOURS PRN
Qty: 28 TABLET | Refills: 0 | Status: SHIPPED | OUTPATIENT
Start: 2022-01-13 | End: 2022-01-20

## 2022-01-13 RX ORDER — BUTALBITAL, ACETAMINOPHEN AND CAFFEINE 50; 325; 40 MG/1; MG/1; MG/1
1 TABLET ORAL EVERY 4 HOURS PRN
Status: DISCONTINUED | OUTPATIENT
Start: 2022-01-13 | End: 2022-01-13 | Stop reason: HOSPADM

## 2022-01-13 RX ADMIN — BUTALBITAL, ACETAMINOPHEN AND CAFFEINE 1 TABLET: 50; 325; 40 TABLET ORAL at 03:01

## 2022-01-13 NOTE — ED PROVIDER NOTES
Encounter Date: 2022       History     Chief Complaint   Patient presents with    COVID-19 Concerns    Chills    Generalized Body Aches     Sx x 2 days    Headache    Photophobia     31-year-old female with no significant past medical history presents to the ER due to 1 day of COVID symptoms.  Patient reports she is having generalized body aches, a generalized headache, sore throat, mild dry cough and shortness of breath, fever to 101 which has improved after taking Aleve and mild chest pains since 22 evening.  She reports the headache is most severe for symptoms she has been taking Tylenol ibuprofen and Aleve without significant improvement.  She denies any nausea vomiting diarrhea she denies new congestion or rhinorrhea.  She has no neck pain or neck stiffness.  She is vaccinated with 1 dose for COVID.  And not vaccinated for flu.  She reports she close contacted the just tested positive on Tuesday.  She had her children tested today and they were negative        Review of patient's allergies indicates:  No Known Allergies  Past Medical History:   Diagnosis Date    No pertinent past medical history      Past Surgical History:   Procedure Laterality Date     SECTION      SKIN GRAFT       Family History   Problem Relation Age of Onset    Diabetes Mother     Hypertension Mother         Review of Systems   Constitutional: Positive for chills, diaphoresis, fatigue and fever. Negative for activity change and appetite change.   HENT: Positive for sore throat. Negative for congestion, postnasal drip and rhinorrhea.    Respiratory: Positive for cough and shortness of breath. Negative for chest tightness, wheezing and stridor.    Cardiovascular: Positive for chest pain. Negative for palpitations.   Gastrointestinal: Negative for abdominal distention, abdominal pain, blood in stool, constipation, diarrhea, nausea and vomiting.   Genitourinary: Negative for dysuria, flank pain, frequency, hematuria  and urgency.   Musculoskeletal: Positive for arthralgias, back pain and myalgias. Negative for neck pain and neck stiffness.   Skin: Negative for rash.   Neurological: Positive for dizziness, weakness and light-headedness. Negative for syncope and headaches.   Hematological: Does not bruise/bleed easily.   Psychiatric/Behavioral: Negative for confusion. The patient is not nervous/anxious.    All other systems reviewed and are negative.      Physical Exam     Initial Vitals [01/13/22 0254]   BP Pulse Resp Temp SpO2   (!) 140/80 102 (!) 21 98.5 °F (36.9 °C) 99 %      MAP       --         Physical Exam    Nursing note and vitals reviewed.  Constitutional: She appears well-developed and well-nourished. She is not diaphoretic. No distress.   HENT:   Head: Normocephalic and atraumatic.   Right Ear: External ear normal.   Left Ear: External ear normal.   Mouth/Throat: Oropharynx is clear and moist. No oropharyngeal exudate.   Nasal congestion mild pharyngeal erythema   Eyes: Conjunctivae and EOM are normal. Pupils are equal, round, and reactive to light.   photophobia   Neck: Neck supple. No tracheal deviation present.   Normal range of motion.  Cardiovascular: Regular rhythm, normal heart sounds and intact distal pulses. Exam reveals no gallop and no friction rub.    No murmur heard.     Pulmonary/Chest: Breath sounds normal. No stridor. No respiratory distress. She has no wheezes. She has no rhonchi. She has no rales. She exhibits no tenderness.   Abdominal: Abdomen is soft. Bowel sounds are normal. She exhibits no distension and no mass. There is no abdominal tenderness. There is no rebound and no guarding.   Musculoskeletal:         General: No edema. Normal range of motion.      Cervical back: Normal range of motion and neck supple.     Neurological: She is alert and oriented to person, place, and time. She has normal strength. No cranial nerve deficit or sensory deficit.   Skin: Skin is warm and dry. No rash  noted. No erythema. No pallor.   Psychiatric: She has a normal mood and affect. Her behavior is normal. Judgment and thought content normal.         ED Course   Procedures  Labs Reviewed   SARS-COV-2 RNA AMPLIFICATION, QUAL   INFLUENZA A AND B ANTIGEN    Narrative:     Specimen Source->Nasopharyngeal Swab          Imaging Results    None          Medications   butalbital-acetaminophen-caffeine -40 mg per tablet 1 tablet (1 tablet Oral Given 1/13/22 0327)     Medical Decision Making:   Clinical Tests:   Lab Tests: Ordered and Reviewed       <> Summary of Lab: covid and flu neg   ED Management:  31-year-old female with no significant past medical history presents to the ER due to 1 day of COVID symptoms.  Patient reports she is having generalized body aches, a generalized headache, sore throat, mild dry cough and shortness of breath, fever to 101 which has improved after taking Aleve and mild chest pains since 1/11/22 evening.  She reports the headache is most severe for symptoms she has been taking Tylenol ibuprofen and Aleve without significant improvement.  She denies any nausea vomiting diarrhea she denies new congestion or rhinorrhea.  She has no neck pain or neck stiffness.  She is vaccinated with 1 dose for COVID.  And not vaccinated for flu.  She reports she close contacted the just tested positive on Tuesday.  She had her children tested today and they were negative  On physical exam blood pressure is 140/80 pulse of 102 temperature of 98.5° sats are 99% on room air respirations 21 clear breath sounds bilaterally patient is in no respiratory distress.  She does have photophobia with her headache and is sitting in a room with a sheet over her head.  Positive nasal congestion mild pharyngeal erythema.  Full range of motion of the neck with no meningismus or nuchal rigidity.  Neuro intact.  Will check her for COVID in flu and I will give Fioricet for her headache.  Bria Najera M.D.  3:14 AM  1/13/2022  covid and flu negative.  She will be discharged home with DX of viral URI and To take symptomatic medications  Bria Najera M.D.  3:53 AM 1/13/2022                        Clinical Impression:   Final diagnoses:  [R51.9] Acute nonintractable headache, unspecified headache type  [J06.9] Viral URI (Primary)          ED Disposition Condition    Discharge Stable        ED Prescriptions     None        Follow-up Information     Follow up With Specialties Details Why Contact Info Additional Information    [facility] - Emergency Dept Emergency Medicine Go to  If symptoms worsen [address] [facility] [address]  [phone] 1st floor    primary care doctor  Schedule an appointment as soon as possible for a visit in 1 week If your symptoms do not improve             Bria Najera MD  01/13/22 0354

## 2022-01-13 NOTE — Clinical Note
"Vanessa "Joey Guerra was seen and treated in our emergency department on 1/13/2022.     COVID-19 is present in our communities across the state. There is limited testing for COVID at this time, so not all patients can be tested. In this situation, your employee meets the following criteria:    Vanessa Guerra has met the criteria for COVID-19 testing and has a NEGATIVE result. The employee can return to work once they are asymptomatic for 24 hours without the use of fever reducing medications (Tylenol, Motrin, etc).     If the employee is not fully vaccinated and had a close contact:  · Retest at 5 to 7 days post-exposure  · If possible, it is recommended that they quarantine for 5 days from the time of contact regardless of their test status.  · A mask should be worn post quarantine for 5 days.    If you have any questions or concerns, or if I can be of further assistance, please do not hesitate to contact me.    Sincerely,             Bria Najera MD"

## 2022-01-13 NOTE — ED NOTES
Adult Physical Assessment  LOC: Vanessa Guerra, 31 y.o. female verified via two identifiers.  The patient is awake, alert, oriented and speaking appropriately at this time.  APPEARANCE: Patient resting comfortably and appears to be in no acute distress at this time. Patient is clean and well groomed, patient's clothing is properly fastened.  SKIN:The skin is warm and dry, color consistent with ethnicity, patient has normal skin turgor and moist mucus membranes, skin intact, no breakdown or brusing noted.  MUSCULOSKELETAL: Patient moving all extremities well, no obvious swelling or deformities noted.  RESPIRATORY: Airway is open and patent, respirations are spontaneous, patient has a normal effort and rate, no accessory muscle use noted.  CARDIAC: Patient has a normal rate and rhythm, no periphreal edema noted in any extremity, capillary refill < 3 seconds in all extremities  ABDOMEN: Soft and non tender to palpation, no abdominal distention noted. Bowel sounds present in all four quadrants.  NEUROLOGIC: Eyes open spontaneously, behavior appropriate to situation, follows commands, facial expression symmetrical, bilateral hand grasp equal and even, purposeful motor response noted, normal sensation in all extremities when touched with a finger.

## 2022-05-05 ENCOUNTER — HOSPITAL ENCOUNTER (EMERGENCY)
Facility: HOSPITAL | Age: 32
Discharge: HOME | End: 2022-05-05
Attending: EMERGENCY MEDICINE
Payer: MEDICAID

## 2022-05-05 VITALS
DIASTOLIC BLOOD PRESSURE: 70 MMHG | HEART RATE: 80 BPM | OXYGEN SATURATION: 100 % | RESPIRATION RATE: 22 BRPM | SYSTOLIC BLOOD PRESSURE: 120 MMHG | WEIGHT: 218.81 LBS | BODY MASS INDEX: 35.32 KG/M2 | TEMPERATURE: 98 F

## 2022-05-05 DIAGNOSIS — K57.90 DIVERTICULOSIS: ICD-10-CM

## 2022-05-05 DIAGNOSIS — K59.00 CONSTIPATION, UNSPECIFIED CONSTIPATION TYPE: Primary | ICD-10-CM

## 2022-05-05 LAB
ALBUMIN SERPL BCP-MCNC: 3.9 G/DL (ref 3.5–5.2)
ALP SERPL-CCNC: 50 U/L (ref 55–135)
ALT SERPL W/O P-5'-P-CCNC: 20 U/L (ref 10–44)
ANION GAP SERPL CALC-SCNC: 10 MMOL/L (ref 8–16)
AST SERPL-CCNC: 18 U/L (ref 10–40)
B-HCG UR QL: NEGATIVE
BACTERIA #/AREA URNS HPF: NEGATIVE /HPF
BASOPHILS # BLD AUTO: 0.06 K/UL (ref 0–0.2)
BASOPHILS NFR BLD: 0.8 % (ref 0–1.9)
BILIRUB SERPL-MCNC: 0.4 MG/DL (ref 0.1–1)
BILIRUB UR QL STRIP: NEGATIVE
BUN SERPL-MCNC: 11 MG/DL (ref 6–20)
CALCIUM SERPL-MCNC: 8.7 MG/DL (ref 8.7–10.5)
CHLORIDE SERPL-SCNC: 107 MMOL/L (ref 95–110)
CLARITY UR: CLEAR
CO2 SERPL-SCNC: 21 MMOL/L (ref 23–29)
COLOR UR: YELLOW
CREAT SERPL-MCNC: 0.7 MG/DL (ref 0.5–1.4)
CTP QC/QA: YES
DIFFERENTIAL METHOD: ABNORMAL
EOSINOPHIL # BLD AUTO: 0.2 K/UL (ref 0–0.5)
EOSINOPHIL NFR BLD: 2.8 % (ref 0–8)
ERYTHROCYTE [DISTWIDTH] IN BLOOD BY AUTOMATED COUNT: 11.9 % (ref 11.5–14.5)
EST. GFR  (AFRICAN AMERICAN): >60 ML/MIN/1.73 M^2
EST. GFR  (NON AFRICAN AMERICAN): >60 ML/MIN/1.73 M^2
GLUCOSE SERPL-MCNC: 100 MG/DL (ref 70–110)
GLUCOSE UR QL STRIP: NEGATIVE
HCT VFR BLD AUTO: 39.3 % (ref 37–48.5)
HGB BLD-MCNC: 13.1 G/DL (ref 12–16)
HGB UR QL STRIP: ABNORMAL
HYALINE CASTS #/AREA URNS LPF: 6 /LPF
IMM GRANULOCYTES # BLD AUTO: 0.03 K/UL (ref 0–0.04)
IMM GRANULOCYTES NFR BLD AUTO: 0.4 % (ref 0–0.5)
KETONES UR QL STRIP: NEGATIVE
LEUKOCYTE ESTERASE UR QL STRIP: NEGATIVE
LIPASE SERPL-CCNC: 32 U/L (ref 4–60)
LYMPHOCYTES # BLD AUTO: 2.9 K/UL (ref 1–4.8)
LYMPHOCYTES NFR BLD: 38.6 % (ref 18–48)
MCH RBC QN AUTO: 32.3 PG (ref 27–31)
MCHC RBC AUTO-ENTMCNC: 33.3 G/DL (ref 32–36)
MCV RBC AUTO: 97 FL (ref 82–98)
MICROSCOPIC COMMENT: ABNORMAL
MONOCYTES # BLD AUTO: 0.3 K/UL (ref 0.3–1)
MONOCYTES NFR BLD: 4.1 % (ref 4–15)
NEUTROPHILS # BLD AUTO: 4.1 K/UL (ref 1.8–7.7)
NEUTROPHILS NFR BLD: 53.3 % (ref 38–73)
NITRITE UR QL STRIP: NEGATIVE
NRBC BLD-RTO: 0 /100 WBC
PH UR STRIP: 6 [PH] (ref 5–8)
PLATELET # BLD AUTO: 300 K/UL (ref 150–450)
PMV BLD AUTO: 9.2 FL (ref 9.2–12.9)
POTASSIUM SERPL-SCNC: 4 MMOL/L (ref 3.5–5.1)
PROT SERPL-MCNC: 7.3 G/DL (ref 6–8.4)
PROT UR QL STRIP: NEGATIVE
RBC # BLD AUTO: 4.06 M/UL (ref 4–5.4)
RBC #/AREA URNS HPF: 2 /HPF (ref 0–4)
SODIUM SERPL-SCNC: 138 MMOL/L (ref 136–145)
SP GR UR STRIP: 1.02 (ref 1–1.03)
SQUAMOUS #/AREA URNS HPF: 2 /HPF
URN SPEC COLLECT METH UR: ABNORMAL
UROBILINOGEN UR STRIP-ACNC: NEGATIVE EU/DL
WBC # BLD AUTO: 7.6 K/UL (ref 3.9–12.7)
WBC #/AREA URNS HPF: 2 /HPF (ref 0–5)

## 2022-05-05 PROCEDURE — 25500020 PHARM REV CODE 255: Performed by: EMERGENCY MEDICINE

## 2022-05-05 PROCEDURE — 81001 URINALYSIS AUTO W/SCOPE: CPT | Performed by: EMERGENCY MEDICINE

## 2022-05-05 PROCEDURE — 81025 URINE PREGNANCY TEST: CPT | Performed by: EMERGENCY MEDICINE

## 2022-05-05 PROCEDURE — 63600175 PHARM REV CODE 636 W HCPCS: Performed by: EMERGENCY MEDICINE

## 2022-05-05 PROCEDURE — 96361 HYDRATE IV INFUSION ADD-ON: CPT

## 2022-05-05 PROCEDURE — 80053 COMPREHEN METABOLIC PANEL: CPT | Performed by: EMERGENCY MEDICINE

## 2022-05-05 PROCEDURE — 25000003 PHARM REV CODE 250: Performed by: EMERGENCY MEDICINE

## 2022-05-05 PROCEDURE — 96374 THER/PROPH/DIAG INJ IV PUSH: CPT

## 2022-05-05 PROCEDURE — 85025 COMPLETE CBC W/AUTO DIFF WBC: CPT | Performed by: EMERGENCY MEDICINE

## 2022-05-05 PROCEDURE — 99285 EMERGENCY DEPT VISIT HI MDM: CPT | Mod: 25

## 2022-05-05 PROCEDURE — 83690 ASSAY OF LIPASE: CPT | Performed by: EMERGENCY MEDICINE

## 2022-05-05 PROCEDURE — 96375 TX/PRO/DX INJ NEW DRUG ADDON: CPT

## 2022-05-05 RX ORDER — MORPHINE SULFATE 2 MG/ML
2 INJECTION, SOLUTION INTRAMUSCULAR; INTRAVENOUS
Status: COMPLETED | OUTPATIENT
Start: 2022-05-05 | End: 2022-05-05

## 2022-05-05 RX ORDER — ONDANSETRON 2 MG/ML
4 INJECTION INTRAMUSCULAR; INTRAVENOUS
Status: COMPLETED | OUTPATIENT
Start: 2022-05-05 | End: 2022-05-05

## 2022-05-05 RX ORDER — LACTULOSE 10 G/15ML
20 SOLUTION ORAL DAILY
Qty: 300 ML | Refills: 0 | Status: SHIPPED | OUTPATIENT
Start: 2022-05-05 | End: 2022-06-16

## 2022-05-05 RX ADMIN — SODIUM CHLORIDE 1000 ML: 0.9 INJECTION, SOLUTION INTRAVENOUS at 08:05

## 2022-05-05 RX ADMIN — ONDANSETRON 4 MG: 2 INJECTION INTRAMUSCULAR; INTRAVENOUS at 08:05

## 2022-05-05 RX ADMIN — MORPHINE SULFATE 2 MG: 2 INJECTION, SOLUTION INTRAMUSCULAR; INTRAVENOUS at 08:05

## 2022-05-05 RX ADMIN — IOHEXOL 100 ML: 350 INJECTION, SOLUTION INTRAVENOUS at 09:05

## 2022-05-05 NOTE — ED NOTES
"Pt sitting up in bed with eyes open, chest rising and falling, resp E/U at this time. Pt reports last BM was "4 days ago" and it was "hard". Abdomen is non tender but pt does c/o pain to the LLQ. No distress noted at this time.   "

## 2022-05-05 NOTE — Clinical Note
"Vanessa Guerra (Frances) was seen and treated in our emergency department on 5/5/2022.  She may return to work on 05/07/2022.       If you have any questions or concerns, please don't hesitate to call.      Maite SY    "

## 2022-05-05 NOTE — ED PROVIDER NOTES
Encounter Date: 2022       History     Chief Complaint   Patient presents with    Abdominal Pain     LLQ x2 days; nauseated    Nausea    Constipation     LBM 4 days ago     Patient with reported abdominal abdominal pain left lower quadrant onset over last 2 days with constipation and nausea she reports that she diagnosed with diverticulitis in the past and symptoms feel somewhat similar she reports a history of  section but no other abdominal surgeries is been no noted blood in her stool she has had chills but no recorded fever she denies any dysuria urgency or frequency no cough for chest pain no hematuria        Review of patient's allergies indicates:  No Known Allergies  Past Medical History:   Diagnosis Date    No pertinent past medical history      Past Surgical History:   Procedure Laterality Date     SECTION      SKIN GRAFT       Family History   Problem Relation Age of Onset    Diabetes Mother     Hypertension Mother         Review of Systems   Constitutional: Positive for chills. Negative for fever.   HENT: Negative for congestion.    Respiratory: Negative for cough and shortness of breath.    Gastrointestinal: Positive for abdominal pain, constipation and nausea. Negative for blood in stool and vomiting.   Genitourinary: Negative for dysuria and hematuria.   All other systems reviewed and are negative.      Physical Exam     Initial Vitals [22 0742]   BP Pulse Resp Temp SpO2   118/82 94 16 98.2 °F (36.8 °C) 97 %      MAP       --         Physical Exam    Constitutional: She appears well-developed and well-nourished. She appears distressed.   HENT:   Head: Normocephalic and atraumatic.   Right Ear: External ear normal.   Left Ear: External ear normal.   Mouth/Throat: Oropharynx is clear and moist.   Eyes: Conjunctivae and EOM are normal. Pupils are equal, round, and reactive to light.   Neck: Neck supple.   Normal range of motion.  Cardiovascular: Normal rate, regular  rhythm, normal heart sounds and intact distal pulses.   Pulmonary/Chest: Breath sounds normal. No respiratory distress.   Abdominal: Abdomen is soft. Bowel sounds are normal. There is abdominal tenderness.   Musculoskeletal:         General: No edema. Normal range of motion.      Cervical back: Normal range of motion and neck supple.     Neurological: She is alert and oriented to person, place, and time. GCS score is 15. GCS eye subscore is 4. GCS verbal subscore is 5. GCS motor subscore is 6.   Skin: Skin is warm and dry. Capillary refill takes less than 2 seconds. No rash noted.   Psychiatric: She has a normal mood and affect. Her behavior is normal.         ED Course   Procedures  Labs Reviewed   CBC W/ AUTO DIFFERENTIAL - Abnormal; Notable for the following components:       Result Value    MCH 32.3 (*)     All other components within normal limits   COMPREHENSIVE METABOLIC PANEL - Abnormal; Notable for the following components:    CO2 21 (*)     Alkaline Phosphatase 50 (*)     All other components within normal limits   URINALYSIS, REFLEX TO URINE CULTURE - Abnormal; Notable for the following components:    Occult Blood UA 3+ (*)     All other components within normal limits    Narrative:     In and Out Cath as needed it patient unable to void  Specimen Source->Urine   URINALYSIS MICROSCOPIC - Abnormal; Notable for the following components:    Hyaline Casts, UA 6 (*)     All other components within normal limits    Narrative:     In and Out Cath as needed it patient unable to void  Specimen Source->Urine   LIPASE   POCT URINE PREGNANCY          Imaging Results          CT Abdomen Pelvis With Contrast (Final result)  Result time 05/05/22 10:05:22    Final result by Felipe Mejias MD (05/05/22 10:05:22)                 Narrative:    CT ABDOMEN AND PELVIS WITH CONTRAST    HISTORY: Left lower quadrant pain    CMS MANDATED QUALITY DATA - CT RADIATION  436    All CT scans at this facility utilize dose modulation,  iterative reconstruction, and/or weight based dosing when appropriate to reduce radiation dose to as low as reasonably achievable    FINDINGS:    Post infusion images were obtained from the lung bases to the pubic symphysis. 100 cc nonionic contrast was administered for the examination.    Comparison is made to December 2021.    The liver has a normal appearance. The gallbladder and biliary tree are unremarkable. The spleen, pancreas, kidneys, and adrenal glands are normal. The abdominal aorta is normal in caliber.    There is no pathologic bowel wall thickening or evidence of obstruction. The appendix is normal. There is a small fat-containing umbilical hernia, unchanged in appearance. No free air or free fluid is identified.    Images of the pelvis demonstrate multiple sigmoid diverticula without evidence of diverticulitis. Previously noted findings of acute diverticulitis on the prior study from December 2021 have resolved. The urinary bladder is normal. There is no pelvic free fluid.    No acute osseous abnormality is identified.    IMPRESSION:      1. Sigmoid diverticula without evidence of diverticulitis. Previously noted findings of acute diverticulitis (December 2021) have resolved.    Electronically signed by:  Felipe Mejias MD  5/5/2022 10:05 AM CDT Workstation: [id number]                               Medications   sodium chloride 0.9% bolus 1,000 mL (0 mLs Intravenous Stopped 5/5/22 1042)   ondansetron injection 4 mg (4 mg Intravenous Given 5/5/22 0853)   morphine injection 2 mg (2 mg Intravenous Given 5/5/22 0854)   iohexoL (OMNIPAQUE 350) injection 100 mL (100 mLs Intravenous Given 5/5/22 0938)     Medical Decision Making:   ED Management:  Patient's CT scan shows evidence of diverticulosis but no diverticulitis will treat constipation with lactulose patient has a appointment for colonoscopy pending at this time recommend that she keep this appointment when scheduled follow-up with her primary  provider or access health return to the ER for any worsened symptoms or new symptoms                      Clinical Impression:   Final diagnoses:  [K59.00] Constipation, unspecified constipation type (Primary)  [K57.90] Diverticulosis          ED Disposition Condition    Discharge Stable        ED Prescriptions     Medication Sig Dispense Start Date End Date Auth. Provider    lactulose (CHRONULAC) 20 gram/30 mL Soln Take 30 mLs (20 g total) by mouth once daily. 300 mL 5/5/2022  Levi Gore MD        Follow-up Information     Follow up With Specialties Details Why Contact Info    [facility]  Call in 1 day  [address] [place] [id number]  [phone]             Levi Gore MD  05/05/22 8687

## 2022-06-07 ENCOUNTER — PATIENT OUTREACH (OUTPATIENT)
Dept: EMERGENCY MEDICINE | Facility: HOSPITAL | Age: 32
End: 2022-06-07
Payer: MEDICAID

## 2022-06-07 ENCOUNTER — HOSPITAL ENCOUNTER (EMERGENCY)
Facility: HOSPITAL | Age: 32
Discharge: HOME | End: 2022-06-07
Attending: EMERGENCY MEDICINE
Payer: MEDICAID

## 2022-06-07 VITALS
HEART RATE: 91 BPM | SYSTOLIC BLOOD PRESSURE: 136 MMHG | HEIGHT: 66 IN | RESPIRATION RATE: 18 BRPM | WEIGHT: 197 LBS | BODY MASS INDEX: 31.66 KG/M2 | TEMPERATURE: 98 F | DIASTOLIC BLOOD PRESSURE: 78 MMHG | OXYGEN SATURATION: 100 %

## 2022-06-07 DIAGNOSIS — K42.9 UMBILICAL HERNIA WITHOUT OBSTRUCTION AND WITHOUT GANGRENE: Primary | ICD-10-CM

## 2022-06-07 LAB
ALBUMIN SERPL BCP-MCNC: 3.7 G/DL (ref 3.5–5.2)
ALP SERPL-CCNC: 59 U/L (ref 55–135)
ALT SERPL W/O P-5'-P-CCNC: 21 U/L (ref 10–44)
ANION GAP SERPL CALC-SCNC: 9 MMOL/L (ref 8–16)
AST SERPL-CCNC: 16 U/L (ref 10–40)
BACTERIA GENITAL QL WET PREP: ABNORMAL
BASOPHILS # BLD AUTO: 0.02 K/UL (ref 0–0.2)
BASOPHILS NFR BLD: 0.3 % (ref 0–1.9)
BILIRUB SERPL-MCNC: 0.3 MG/DL (ref 0.1–1)
BILIRUB UR QL STRIP: ABNORMAL
BUN SERPL-MCNC: 10 MG/DL (ref 6–20)
CALCIUM SERPL-MCNC: 9 MG/DL (ref 8.7–10.5)
CHLORIDE SERPL-SCNC: 109 MMOL/L (ref 95–110)
CLARITY UR: CLEAR
CLUE CELLS VAG QL WET PREP: ABNORMAL
CO2 SERPL-SCNC: 23 MMOL/L (ref 23–29)
COLOR UR: YELLOW
CREAT SERPL-MCNC: 0.8 MG/DL (ref 0.5–1.4)
DIFFERENTIAL METHOD: ABNORMAL
EOSINOPHIL # BLD AUTO: 0.3 K/UL (ref 0–0.5)
EOSINOPHIL NFR BLD: 4 % (ref 0–8)
ERYTHROCYTE [DISTWIDTH] IN BLOOD BY AUTOMATED COUNT: 11.9 % (ref 11.5–14.5)
EST. GFR  (AFRICAN AMERICAN): >60 ML/MIN/1.73 M^2
EST. GFR  (NON AFRICAN AMERICAN): >60 ML/MIN/1.73 M^2
FILAMENT FUNGI VAG WET PREP-#/AREA: ABNORMAL
GLUCOSE SERPL-MCNC: 92 MG/DL (ref 70–110)
GLUCOSE UR QL STRIP: NEGATIVE
HCT VFR BLD AUTO: 37.2 % (ref 37–48.5)
HGB BLD-MCNC: 12.7 G/DL (ref 12–16)
HGB UR QL STRIP: NEGATIVE
IMM GRANULOCYTES # BLD AUTO: 0.04 K/UL (ref 0–0.04)
IMM GRANULOCYTES NFR BLD AUTO: 0.6 % (ref 0–0.5)
KETONES UR QL STRIP: ABNORMAL
LEUKOCYTE ESTERASE UR QL STRIP: NEGATIVE
LIPASE SERPL-CCNC: 15 U/L (ref 4–60)
LYMPHOCYTES # BLD AUTO: 2.1 K/UL (ref 1–4.8)
LYMPHOCYTES NFR BLD: 30.1 % (ref 18–48)
MCH RBC QN AUTO: 33.4 PG (ref 27–31)
MCHC RBC AUTO-ENTMCNC: 34.1 G/DL (ref 32–36)
MCV RBC AUTO: 98 FL (ref 82–98)
MONOCYTES # BLD AUTO: 0.4 K/UL (ref 0.3–1)
MONOCYTES NFR BLD: 6.1 % (ref 4–15)
NEUTROPHILS # BLD AUTO: 4.1 K/UL (ref 1.8–7.7)
NEUTROPHILS NFR BLD: 58.9 % (ref 38–73)
NITRITE UR QL STRIP: NEGATIVE
NRBC BLD-RTO: 0 /100 WBC
PH UR STRIP: 7 [PH] (ref 5–8)
PLATELET # BLD AUTO: 284 K/UL (ref 150–450)
PMV BLD AUTO: 9.3 FL (ref 9.2–12.9)
POTASSIUM SERPL-SCNC: 4.2 MMOL/L (ref 3.5–5.1)
PROT SERPL-MCNC: 7.1 G/DL (ref 6–8.4)
PROT UR QL STRIP: NEGATIVE
RBC # BLD AUTO: 3.8 M/UL (ref 4–5.4)
SODIUM SERPL-SCNC: 141 MMOL/L (ref 136–145)
SP GR UR STRIP: 1.02 (ref 1–1.03)
SPECIMEN SOURCE: ABNORMAL
T VAGINALIS GENITAL QL WET PREP: ABNORMAL
URN SPEC COLLECT METH UR: ABNORMAL
UROBILINOGEN UR STRIP-ACNC: 1 EU/DL
WBC # BLD AUTO: 7 K/UL (ref 3.9–12.7)
WBC #/AREA VAG WET PREP: ABNORMAL
YEAST GENITAL QL WET PREP: ABNORMAL

## 2022-06-07 PROCEDURE — 63600175 PHARM REV CODE 636 W HCPCS: Performed by: EMERGENCY MEDICINE

## 2022-06-07 PROCEDURE — 86803 HEPATITIS C AB TEST: CPT | Performed by: EMERGENCY MEDICINE

## 2022-06-07 PROCEDURE — 87491 CHLMYD TRACH DNA AMP PROBE: CPT | Performed by: EMERGENCY MEDICINE

## 2022-06-07 PROCEDURE — 87210 SMEAR WET MOUNT SALINE/INK: CPT | Performed by: EMERGENCY MEDICINE

## 2022-06-07 PROCEDURE — 87389 HIV-1 AG W/HIV-1&-2 AB AG IA: CPT | Performed by: EMERGENCY MEDICINE

## 2022-06-07 PROCEDURE — 36415 COLL VENOUS BLD VENIPUNCTURE: CPT | Performed by: EMERGENCY MEDICINE

## 2022-06-07 PROCEDURE — 99285 EMERGENCY DEPT VISIT HI MDM: CPT | Mod: 25

## 2022-06-07 PROCEDURE — 87591 N.GONORRHOEAE DNA AMP PROB: CPT | Performed by: EMERGENCY MEDICINE

## 2022-06-07 PROCEDURE — 85025 COMPLETE CBC W/AUTO DIFF WBC: CPT | Performed by: EMERGENCY MEDICINE

## 2022-06-07 PROCEDURE — 96376 TX/PRO/DX INJ SAME DRUG ADON: CPT

## 2022-06-07 PROCEDURE — 80053 COMPREHEN METABOLIC PANEL: CPT | Performed by: EMERGENCY MEDICINE

## 2022-06-07 PROCEDURE — 81003 URINALYSIS AUTO W/O SCOPE: CPT | Performed by: EMERGENCY MEDICINE

## 2022-06-07 PROCEDURE — 83690 ASSAY OF LIPASE: CPT | Performed by: EMERGENCY MEDICINE

## 2022-06-07 PROCEDURE — 96374 THER/PROPH/DIAG INJ IV PUSH: CPT

## 2022-06-07 PROCEDURE — 96375 TX/PRO/DX INJ NEW DRUG ADDON: CPT

## 2022-06-07 RX ORDER — MORPHINE SULFATE 2 MG/ML
6 INJECTION, SOLUTION INTRAMUSCULAR; INTRAVENOUS
Status: COMPLETED | OUTPATIENT
Start: 2022-06-07 | End: 2022-06-07

## 2022-06-07 RX ORDER — HYDROCODONE BITARTRATE AND ACETAMINOPHEN 10; 325 MG/1; MG/1
1 TABLET ORAL EVERY 4 HOURS PRN
Qty: 18 TABLET | Refills: 0 | Status: SHIPPED | OUTPATIENT
Start: 2022-06-07 | End: 2023-08-07

## 2022-06-07 RX ORDER — ONDANSETRON 2 MG/ML
4 INJECTION INTRAMUSCULAR; INTRAVENOUS
Status: COMPLETED | OUTPATIENT
Start: 2022-06-07 | End: 2022-06-07

## 2022-06-07 RX ADMIN — MORPHINE SULFATE 6 MG: 2 INJECTION, SOLUTION INTRAMUSCULAR; INTRAVENOUS at 12:06

## 2022-06-07 RX ADMIN — ONDANSETRON 4 MG: 2 INJECTION INTRAMUSCULAR; INTRAVENOUS at 12:06

## 2022-06-07 RX ADMIN — MORPHINE SULFATE 6 MG: 2 INJECTION, SOLUTION INTRAMUSCULAR; INTRAVENOUS at 03:06

## 2022-06-07 NOTE — ED NOTES
Assumed care:  Vanessa Guerra is awake, alert and oriented x 3, skin warm and dry, in NAD.  CO lower abd pain with N&V that started yesterday.  States that she has a history of diverticulitis but has not followed up with GI.    Patient identifiers for Vanessa Guerra checked and correct.  LOC:  Vanessa Guerra is awake, alert, and aware of environment with an appropriate affect. She is oriented x 3 and speaking appropriately.  APPEARANCE:  She is resting comfortably and in no acute distress. She is clean and well groomed, patient's clothing is properly fastened.  SKIN:  The skin is warm and dry. She has normal skin turgor and moist mucus membranes. Skin is intact; no bruising or breakdown noted.  MUSCULOSKELETAL:  She is moving all extremities well, no obvious deformities noted. Pulses intact.   RESPIRATORY:  Airway is open and patent. Respirations are spontaneous and non-labored with normal effort and rate.  CARDIAC:  tachycardia. No peripheral edema noted. Capillary refill < 3 seconds.  ABDOMEN:  No distention noted.  Soft and non-tender upon palpation.  abd pain, N&V  NEUROLOGICAL:  PERRL. Facial expression is symmetrical. Hand grasps are equal bilaterally. Normal sensation in all extremities when touched with finger.  Allergies reported:  Review of patient's allergies indicates:  No Known Allergies  OTHER NOTES:

## 2022-06-07 NOTE — ED PROVIDER NOTES
Encounter Date: 2022    SCRIBE #1 NOTE: I, Leslie Cardoza, am scribing for, and in the presence of, Jaswant Correa MD.       History     Chief Complaint   Patient presents with    Abdominal Pain     X 3 weeks, N/V started yesterday; diagnosed with diverticulosis in December, has not followed up with GI      Time seen by provider: 11:48 AM on 2022    Vanessa Guerra is a 31 y.o. female who presents to the ED with an onset of lower abdominal pain. Lower abdominal pain that radiates towards the left began 3 weeks ago and has been worse the last few days. She reports loose stool the last 3 days with a little blood with BM 4 days ago, and again, 2 days ago. The patient also reports malodorous vaginal discharge, as well as multiple episodes of N/V since midnight. She has not been sexually active the last 5 months since hospitalization for diverticulitis. She denies fever, burning with urination, or any other symptoms at this time. She has appointment with GI doctor later this week but could not get available appointment with PCP until later this year. PMHx of diverticulosis. PSHx of .    The history is provided by the patient.     Review of patient's allergies indicates:  No Known Allergies  Past Medical History:   Diagnosis Date    Diverticulosis     No pertinent past medical history      Past Surgical History:   Procedure Laterality Date     SECTION      SKIN GRAFT       Family History   Problem Relation Age of Onset    Diabetes Mother     Hypertension Mother      Social History     Tobacco Use    Smoking status: Current Every Day Smoker    Smokeless tobacco: Never Used   Substance Use Topics    Alcohol use: Not Currently    Drug use: Never     Review of Systems   Constitutional: Negative for fever.   HENT: Negative for sore throat.    Respiratory: Negative for shortness of breath.    Cardiovascular: Negative for chest pain.   Gastrointestinal: Positive for abdominal pain,  nausea and vomiting.        Positive for loose stool.   Genitourinary: Positive for vaginal discharge. Negative for dysuria.   Musculoskeletal: Negative for back pain.   Skin: Negative for rash.   Neurological: Negative for weakness.   Hematological: Does not bruise/bleed easily.       Physical Exam     Initial Vitals [06/07/22 1046]   BP Pulse Resp Temp SpO2   (!) 165/75 105 20 97.9 °F (36.6 °C) 98 %      MAP       --         Physical Exam    Nursing note and vitals reviewed.  Constitutional: Vital signs are normal. She appears well-developed and well-nourished.  Non-toxic appearance. No distress.   HENT:   Head: Normocephalic and atraumatic.   Eyes: EOM are normal. Pupils are equal, round, and reactive to light.   Neck: Neck supple. No JVD present.   Normal range of motion.  Cardiovascular: Normal rate, regular rhythm, normal heart sounds and intact distal pulses. Exam reveals no gallop and no friction rub.    No murmur heard.  Pulmonary/Chest: Breath sounds normal. She has no wheezes. She has no rhonchi. She has no rales.   Abdominal: Abdomen is soft. Bowel sounds are normal. There is abdominal tenderness. A hernia is present. Hernia confirmed positive in the umbilical area.   Diffuse abdominal tenderness in lower abdomen with guarding. There is guarding. There is no rebound.   Genitourinary:    Pelvic exam was performed with patient supine.   Cervix exhibits no motion tenderness.    Genitourinary Comments: Female chaperone present: Bridgette Hernandez RN. On pelvic exam: Moderate amount of thick, white discharge. No CMT.     Musculoskeletal:         General: Normal range of motion.      Cervical back: Normal range of motion and neck supple. No rigidity.     Neurological: She is alert and oriented to person, place, and time. She has normal strength and normal reflexes. No cranial nerve deficit or sensory deficit. She exhibits normal muscle tone. Coordination normal. GCS eye subscore is 4. GCS verbal subscore is 5.  GCS motor subscore is 6.   Skin: Skin is warm and dry.   Psychiatric: She has a normal mood and affect. Her speech is normal and behavior is normal. She is not actively hallucinating.         ED Course   Procedures  Labs Reviewed   VAGINAL SCREEN - Abnormal; Notable for the following components:       Result Value    WBC - Vaginal Screen Moderate (*)     Bacteria - Vaginal Screen Moderate (*)     All other components within normal limits    Narrative:     Release to patient->Immediate   CBC W/ AUTO DIFFERENTIAL - Abnormal; Notable for the following components:    RBC 3.80 (*)     MCH 33.4 (*)     Immature Granulocytes 0.6 (*)     All other components within normal limits   URINALYSIS, REFLEX TO URINE CULTURE - Abnormal; Notable for the following components:    Ketones, UA Trace (*)     Bilirubin (UA) 1+ (*)     All other components within normal limits    Narrative:     Specimen Source->Urine   C. TRACHOMATIS/N. GONORRHOEAE BY AMP DNA   HIV 1 / 2 ANTIBODY    Narrative:     Release to patient->Immediate   HEPATITIS C ANTIBODY    Narrative:     Release to patient->Immediate   COMPREHENSIVE METABOLIC PANEL   LIPASE          Imaging Results          CT Abdomen Pelvis  Without Contrast (Final result)  Result time 06/07/22 12:59:56    Final result by Genie oLyola MD (06/07/22 12:59:56)                 Impression:      There are no findings to explain this patient's acute abdominal pain.  There is sigmoid diverticulosis.      Electronically signed by: Genie Loyola MD  Date:    06/07/2022  Time:    12:59             Narrative:    EXAMINATION:  CT ABDOMEN PELVIS WITHOUT CONTRAST    CLINICAL HISTORY:  LLQ abdominal pain;RLQ abdominal pain (Age >= 14y);    TECHNIQUE:  Low dose axial images, sagittal and coronal reformations were obtained from the lung bases to the pubic symphysis.  30 mL of oral Omnipaque 350 was administered.    COMPARISON:  05/05/2022    FINDINGS:  The liver, spleen, adrenal glands, pancreas and  kidneys are unremarkable.  There is no hydronephrosis.    There is sigmoid diverticulosis without evidence diverticulitis.  Stool is seen throughout the colon.  The appendix is within normal limits.  There is tiny fat containing umbilical hernia.    There is no evidence bowel obstruction.  There is no evidence of abdominal nor pelvic lymphadenopathy.  The osseous structures are unremarkable.                                 Medications   morphine injection 6 mg (6 mg Intravenous Given 6/7/22 1222)   ondansetron injection 4 mg (4 mg Intravenous Given 6/7/22 1222)   morphine injection 6 mg (6 mg Intravenous Given 6/7/22 1510)     Medical Decision Making:   History:   Old Medical Records: I decided to obtain old medical records.  Initial Assessment:   Ms. Guerra is a very pleasant 31-year-old woman presents emergency department for lower abdominal pain that has been ongoing for the past 3 weeks, worse over the past few days.  Examination reveals tenderness in the lower abdomen with a non reducible umbilical hernia.  CT abdomen pelvis performed revealing fat containing hernia without any evidence of appendicitis, pancreatitis, obstruction, intra-abdominal abscess or any acute abnormalities.  She has no evidence of active diverticulitis.  No evidence of PID on pelvic examination.  I believe her pain is caused by her fat containing umbilical hernia and will refer her to General surgery for symptomatic relief.  She is provided with a short course of pain medication.  This does not contain bowel and there is no evidence of obstruction.  Pain is rest in the ED with improvement.  Patient educated about return precautions with the hernia, fever or any other concerns.  Clinical Tests:   Lab Tests: Ordered and Reviewed  Radiological Study: Reviewed and Ordered          Scribe Attestation:   Scribe #1: I performed the above scribed service and the documentation accurately describes the services I performed. I attest to the  accuracy of the note.               I, Sunny Arias, personally performed the services described in this documentation. All medical record entries made by the scribe were at my direction and in my presence.  I have reviewed the chart and agree that the record reflects my personal performance and is accurate and complete. Jaswant Correa MD.  2:17 PM 06/08/2022       DISCLAIMER: This note was prepared with [employer] voice recognition transcription software. Garbled syntax, mangled pronouns, and other bizarre constructions may be attributed to that software system.    Clinical Impression:   Final diagnoses:  [K42.9] Umbilical hernia without obstruction and without gangrene (Primary)          ED Disposition Condition    Discharge Stable        ED Prescriptions     Medication Sig Dispense Start Date End Date Auth. Provider    HYDROcodone-acetaminophen (NORCO)  mg per tablet Take 1 tablet by mouth every 4 (four) hours as needed for Pain. 18 tablet 6/7/2022  Jaswant Correa MD        Follow-up Information     Follow up With Specialties Details Why Contact Info    Shahram Henderson III, MD General Surgery, Surgery   [address] [place] [address]  [address]  [facility] [address]  [phone]      [place] Surgical Oncology, Orthopedic Surgery, Genetics, Physical Medicine and Rehabilitation, Occupational Therapy, Radiology Schedule an appointment as soon as possible for a visit  referral sent for general surgery [address] [facility] [id number] [phone]      [facility] Emergency Dept Emergency Medicine  As needed, If symptoms worsen [address] [place] [id number] [phone]           Jaswant Correa MD  06/08/22 8807

## 2022-06-07 NOTE — PROGRESS NOTES
Helen Leone  ED Navigator  Emergency Department    [place] [facility] [place]  Role: Community Health Worker    Date: 06/07/2022  Patient Name: Vanessa Guerra  MRN: [id number]  PCP: SASHA Yanes FNP-C    Assessment:     Vanessa Guerra is a 31 y.o. female who has presented to ED for abdominal pain. Patient has visited the ED 2 times in the past 3 months. Patient did not contact PCP.     ED Navigator Initial Assessment    ED Navigator Enrollment Documentation  Consent to Services  Does patient consent to completing the assessment?: Yes  Contact  Method of Initial Contact: Face to Face  Transportation  Does the patient have issues with Transportation?: No  Does the patient have transportation to and from healthcare appointments?: Yes  Insurance Coverage  Do you have coverage/adequate coverage?: Yes  Type/kind of coverage: Medicaid/[place] [employer]  Is patient able to afford co-pays/deductibles?: Yes  Is patient able to afford HME or supplies?: Yes  Does patient have an established [facility] PCP?: Yes  Able to access?: Yes  Does the patient have a lack of adequate coverage?: No  Specialist Appointment  Did the patient come to the ED to see a specialist?: No  Does the patient have a pending specialist referral?: No  Does the patient have a specialist appointment made?: No  PCP Follow Up Appointment  Has the patient had an appointment with a primary care provider in the past year?: No  Does the patient have a follow up appontment with a PCP?: Yes  Upcoming appointment date: 8/7/22  Provider: Clary Adan RN  Medications  Is patient able to afford medication?: Yes  Is patient unable to get medication due to lack of transportation?: No  Psychological  Does the patient have psycho-social concerns?: No  Food  Does the patient have concerns about food?: No  Communication/Education  Does the patient have limited English proficiency/English not primary language?: No  Does patient have low literacy and/or  low health literacy?: Yes  Does patient have concerns with care?: No  Does patient have dissatisfaction with care?: No  Other Financial Concerns  Does the patient have immediate financial distress?: No  Does the patient have general financial concerns?: No  Other Social Barriers/Concerns  Does the patient have any additional barriers or concerns?: None  Primary Barrier  Barriers identified: Structural barrier (service availability, waiting times, etc.)  Root Cause of ED Utilization: Patient Knowledge/Low Health Literacy  Plan to address Patient Knowledge/Low Health Literacy: Provided information for [facility] 24/7 Nurse triage line [phone] or 1-866-[facility] ([phone])  Next steps: Provided Education  Was education/educational materials provided surrounding PCP services/creating a medical home?: Yes Was education verbal or written?: Written   Was education/educational materials provided surrounding low cost, healthy foods?: Yes Was education verbal or written?: Written   Was education/educational materials provided surrounding other items? If so, use comment to explain.: No    Plan: Provided information for [facility] 24/7 Nurse triage line, [phone] or 1-866-[facility] ([phone])  Expected Date of Follow Up 1: 6/14/22         Social History     Socioeconomic History    Marital status: Single   Tobacco Use    Smoking status: Current Every Day Smoker    Smokeless tobacco: Never Used   Substance and Sexual Activity    Alcohol use: Not Currently    Drug use: Never    Sexual activity: Yes     Partners: Male     Comment: [place] shot     Social Determinants of Health     Financial Resource Strain: Low Risk     Difficulty of Paying Living Expenses: Not hard at all   Food Insecurity: No Food Insecurity    Worried About Running Out of Food in the Last Year: Never true    Ran Out of Food in the Last Year: Never true   Transportation Needs: No Transportation Needs    Lack of Transportation  (Medical): No    Lack of Transportation (Non-Medical): No   Stress: No Stress Concern Present    Feeling of Stress : Not at all   Social Connections: Unknown    Frequency of Communication with Friends and Family: Three times a week    Frequency of Social Gatherings with Friends and Family: Three times a week    Marital Status: Living with partner   Housing Stability: Unknown    Unable to Pay for Housing in the Last Year: No    Unstable Housing in the Last Year: No       Plan:   ED Navigator spoke with patient in the ER and completed initial enrollment.  Patient denied any concerns with food, housing, utilities, or transportation.  Patient stated they changed her PCP to Clary Adan, and the earliest appointment she could get with her was in August.  Patient stated she needs to see a GI doctor for her stomach issues, and she asked for assistance with getting an appointment.  Patient denied needing help establishing other providers.  Patient is a smoker.  The following resources were given to patient at bedside:  [facility] brochure, [facility] Nurse Triage line, information on virtual medicine, Eating Healthy tip sheet.  Information on [facility] was emailed to patient.    Helen Leone  ED Navigator

## 2022-06-08 LAB
C TRACH DNA SPEC QL NAA+PROBE: NOT DETECTED
HCV AB SERPL QL IA: NEGATIVE
HIV 1+2 AB+HIV1 P24 AG SERPL QL IA: NEGATIVE
N GONORRHOEA DNA SPEC QL NAA+PROBE: NOT DETECTED

## 2022-06-13 ENCOUNTER — HOSPITAL ENCOUNTER (EMERGENCY)
Facility: HOSPITAL | Age: 32
Discharge: HOME | End: 2022-06-13
Attending: EMERGENCY MEDICINE
Payer: MEDICAID

## 2022-06-13 VITALS
HEART RATE: 70 BPM | RESPIRATION RATE: 17 BRPM | WEIGHT: 198 LBS | TEMPERATURE: 99 F | DIASTOLIC BLOOD PRESSURE: 68 MMHG | HEIGHT: 66 IN | BODY MASS INDEX: 31.82 KG/M2 | OXYGEN SATURATION: 100 % | SYSTOLIC BLOOD PRESSURE: 126 MMHG

## 2022-06-13 DIAGNOSIS — K59.00 CONSTIPATION, UNSPECIFIED CONSTIPATION TYPE: ICD-10-CM

## 2022-06-13 DIAGNOSIS — K42.9 UMBILICAL HERNIA WITHOUT OBSTRUCTION AND WITHOUT GANGRENE: ICD-10-CM

## 2022-06-13 DIAGNOSIS — R10.9 ABDOMINAL PAIN: Primary | ICD-10-CM

## 2022-06-13 LAB
ALBUMIN SERPL BCP-MCNC: 3.6 G/DL (ref 3.5–5.2)
ALP SERPL-CCNC: 52 U/L (ref 55–135)
ALT SERPL W/O P-5'-P-CCNC: 25 U/L (ref 10–44)
ANION GAP SERPL CALC-SCNC: 8 MMOL/L (ref 8–16)
AST SERPL-CCNC: 17 U/L (ref 10–40)
B-HCG UR QL: NEGATIVE
BASOPHILS # BLD AUTO: 0.04 K/UL (ref 0–0.2)
BASOPHILS NFR BLD: 0.4 % (ref 0–1.9)
BILIRUB SERPL-MCNC: 0.3 MG/DL (ref 0.1–1)
BILIRUB UR QL STRIP: NEGATIVE
BUN SERPL-MCNC: 13 MG/DL (ref 6–20)
CALCIUM SERPL-MCNC: 8.8 MG/DL (ref 8.7–10.5)
CHLORIDE SERPL-SCNC: 109 MMOL/L (ref 95–110)
CLARITY UR: CLEAR
CO2 SERPL-SCNC: 22 MMOL/L (ref 23–29)
COLOR UR: YELLOW
CREAT SERPL-MCNC: 0.9 MG/DL (ref 0.5–1.4)
DIFFERENTIAL METHOD: ABNORMAL
EOSINOPHIL # BLD AUTO: 0.3 K/UL (ref 0–0.5)
EOSINOPHIL NFR BLD: 3 % (ref 0–8)
ERYTHROCYTE [DISTWIDTH] IN BLOOD BY AUTOMATED COUNT: 11.9 % (ref 11.5–14.5)
EST. GFR  (AFRICAN AMERICAN): >60 ML/MIN/1.73 M^2
EST. GFR  (NON AFRICAN AMERICAN): >60 ML/MIN/1.73 M^2
GLUCOSE SERPL-MCNC: 89 MG/DL (ref 70–110)
GLUCOSE UR QL STRIP: NEGATIVE
HCT VFR BLD AUTO: 38 % (ref 37–48.5)
HGB BLD-MCNC: 12.8 G/DL (ref 12–16)
HGB UR QL STRIP: NEGATIVE
IMM GRANULOCYTES # BLD AUTO: 0.06 K/UL (ref 0–0.04)
IMM GRANULOCYTES NFR BLD AUTO: 0.7 % (ref 0–0.5)
KETONES UR QL STRIP: NEGATIVE
LEUKOCYTE ESTERASE UR QL STRIP: NEGATIVE
LIPASE SERPL-CCNC: 15 U/L (ref 4–60)
LYMPHOCYTES # BLD AUTO: 3.6 K/UL (ref 1–4.8)
LYMPHOCYTES NFR BLD: 39.9 % (ref 18–48)
MCH RBC QN AUTO: 33.3 PG (ref 27–31)
MCHC RBC AUTO-ENTMCNC: 33.7 G/DL (ref 32–36)
MCV RBC AUTO: 99 FL (ref 82–98)
MONOCYTES # BLD AUTO: 0.4 K/UL (ref 0.3–1)
MONOCYTES NFR BLD: 4.4 % (ref 4–15)
NEUTROPHILS # BLD AUTO: 4.7 K/UL (ref 1.8–7.7)
NEUTROPHILS NFR BLD: 51.6 % (ref 38–73)
NITRITE UR QL STRIP: NEGATIVE
NRBC BLD-RTO: 0 /100 WBC
PH UR STRIP: 7 [PH] (ref 5–8)
PLATELET # BLD AUTO: 297 K/UL (ref 150–450)
PMV BLD AUTO: 9.2 FL (ref 9.2–12.9)
POTASSIUM SERPL-SCNC: 4.2 MMOL/L (ref 3.5–5.1)
PROT SERPL-MCNC: 7.1 G/DL (ref 6–8.4)
PROT UR QL STRIP: NEGATIVE
RBC # BLD AUTO: 3.84 M/UL (ref 4–5.4)
SODIUM SERPL-SCNC: 139 MMOL/L (ref 136–145)
SP GR UR STRIP: 1.01 (ref 1–1.03)
URN SPEC COLLECT METH UR: NORMAL
UROBILINOGEN UR STRIP-ACNC: NEGATIVE EU/DL
WBC # BLD AUTO: 9.05 K/UL (ref 3.9–12.7)

## 2022-06-13 PROCEDURE — 99284 EMERGENCY DEPT VISIT MOD MDM: CPT | Mod: 25

## 2022-06-13 PROCEDURE — 36415 COLL VENOUS BLD VENIPUNCTURE: CPT | Performed by: PHYSICIAN ASSISTANT

## 2022-06-13 PROCEDURE — 96374 THER/PROPH/DIAG INJ IV PUSH: CPT

## 2022-06-13 PROCEDURE — 85025 COMPLETE CBC W/AUTO DIFF WBC: CPT | Performed by: PHYSICIAN ASSISTANT

## 2022-06-13 PROCEDURE — 81025 URINE PREGNANCY TEST: CPT | Performed by: EMERGENCY MEDICINE

## 2022-06-13 PROCEDURE — 83690 ASSAY OF LIPASE: CPT | Performed by: PHYSICIAN ASSISTANT

## 2022-06-13 PROCEDURE — 80053 COMPREHEN METABOLIC PANEL: CPT | Performed by: PHYSICIAN ASSISTANT

## 2022-06-13 PROCEDURE — 81003 URINALYSIS AUTO W/O SCOPE: CPT | Performed by: PHYSICIAN ASSISTANT

## 2022-06-13 PROCEDURE — 63600175 PHARM REV CODE 636 W HCPCS: Performed by: PHYSICIAN ASSISTANT

## 2022-06-13 RX ORDER — KETOROLAC TROMETHAMINE 30 MG/ML
15 INJECTION, SOLUTION INTRAMUSCULAR; INTRAVENOUS
Status: COMPLETED | OUTPATIENT
Start: 2022-06-13 | End: 2022-06-13

## 2022-06-13 RX ADMIN — KETOROLAC TROMETHAMINE 15 MG: 30 INJECTION, SOLUTION INTRAMUSCULAR at 08:06

## 2022-06-13 NOTE — FIRST PROVIDER EVALUATION
Emergency Department TeleTriage Encounter Note      CHIEF COMPLAINT    Chief Complaint   Patient presents with    Abdominal Pain    Vaginal Bleeding     Seen in this ED last week        VITAL SIGNS   Initial Vitals [06/13/22 1618]   BP Pulse Resp Temp SpO2   132/73 88 18 98.3 °F (36.8 °C) 98 %      MAP       --            ALLERGIES    Review of patient's allergies indicates:  No Known Allergies    PROVIDER TRIAGE NOTE  Patient presents with right sided pelvic pain and vaginal spotting. Also complains of dysuria.        ORDERS  Labs Reviewed   URINALYSIS, REFLEX TO URINE CULTURE   POCT URINE PREGNANCY       ED Orders (720h ago, onward)    Start Ordered     Status Ordering Provider    06/13/22 1650 06/13/22 1649  POCT urine pregnancy  Once         Ordered PANCOAST, GILDA H.    06/13/22 1650 06/13/22 1649  Urinalysis, Reflex to Urine Culture Urine, Clean Catch  STAT         Ordered PANCOAST, GILDA H.            Virtual Visit Note: The provider triage portion of this emergency department evaluation and documentation was performed via [employer], a HIPAA-compliant telemedicine application, in concert with a tele-presenter in the room. A face to face patient evaluation with one of my colleagues will occur once the patient is placed in an emergency department room.      DISCLAIMER: This note was prepared with [employer] voice recognition transcription software. Garbled syntax, mangled pronouns, and other bizarre constructions may be attributed to that software system.

## 2022-06-14 ENCOUNTER — PATIENT OUTREACH (OUTPATIENT)
Dept: EMERGENCY MEDICINE | Facility: HOSPITAL | Age: 32
End: 2022-06-14
Payer: MEDICAID

## 2022-06-14 NOTE — ED NOTES
Pt AAOx4, Abc's intact. NADN. No adverse reaction to medication given. D/C instructions in pt possession along with belongings. No other needs at this time. Pt ambulatory to ED Lobby without assistance, steady gait.

## 2022-06-14 NOTE — ED PROVIDER NOTES
Encounter Date: 2022    SCRIBE #1 NOTE: IElisabeth, am scribing for, and in the presence of, Merary Lucia PA-C.       History     Chief Complaint   Patient presents with    Abdominal Pain    Vaginal Bleeding     Seen in this ED last week      Time seen by provider: 7:01 PM on 2022    Vanessa Guerra is a 31 y.o. female who presents to the ED with an onset of severe abdominal pain which began last week after previous ED visit for umbilical hernia. The patient also endorses pain at her belly button and N/V began yesterday evening. She reports she had some bleeding which she is unsure if she can from her urine or vaginal area. She does report no blood in her stools. Patient states she has been taking prescribed medication from previous ED visit with last dose 2 hours PTA. She states the symptoms have been presents since finding out she has an umbilical hernia. The patient denies urinary Sx, fever, or any other symptoms at this time. Patient notes she is on Depo-Provera shot. PMHx of diverticulosis. PSHx of .       The history is provided by the patient.     Review of patient's allergies indicates:  No Known Allergies  Past Medical History:   Diagnosis Date    Diverticulosis     No pertinent past medical history      Past Surgical History:   Procedure Laterality Date     SECTION      SKIN GRAFT       Family History   Problem Relation Age of Onset    Diabetes Mother     Hypertension Mother      Social History     Tobacco Use    Smoking status: Current Every Day Smoker    Smokeless tobacco: Never Used   Substance Use Topics    Alcohol use: Not Currently    Drug use: Never     Review of Systems   Constitutional: Negative for activity change, appetite change, chills and fever.   HENT: Negative for congestion, rhinorrhea and sore throat.    Eyes: Negative for redness and visual disturbance.   Respiratory: Negative for cough, chest tightness and shortness of breath.     Cardiovascular: Negative for chest pain.   Gastrointestinal: Positive for abdominal pain, nausea and vomiting. Negative for diarrhea.   Genitourinary: Negative for difficulty urinating, dysuria, frequency, pelvic pain and vaginal bleeding.   Musculoskeletal: Negative for back pain, neck pain and neck stiffness.   Skin: Negative for rash.   Neurological: Negative for dizziness, syncope, numbness and headaches.       Physical Exam     Initial Vitals [06/13/22 1618]   BP Pulse Resp Temp SpO2   132/73 88 18 98.3 °F (36.8 °C) 98 %      MAP       --         Physical Exam    Nursing note and vitals reviewed.  Constitutional: Vital signs are normal. She appears well-developed and well-nourished. She is cooperative.  Non-toxic appearance. She does not have a sickly appearance.   HENT:   Head: Normocephalic and atraumatic.   Right Ear: External ear normal.   Left Ear: External ear normal.   Nose: Nose normal.   Eyes: Conjunctivae and lids are normal.   Neck:    Full passive range of motion without pain.     Cardiovascular: Normal rate, regular rhythm and normal heart sounds. Exam reveals no gallop and no friction rub.    No murmur heard.  Pulmonary/Chest: Breath sounds normal. She has no wheezes. She has no rhonchi. She has no rales.   Abdominal: Abdomen is soft. There is abdominal tenderness.   Tenderness to palpation with no rebound or guarding. Small umbilical hernia noted with no erythema or overlying skin changes.     There is no rebound and no guarding.   Genitourinary: There is no rash, tenderness or lesion on the right labia. There is no rash, tenderness or lesion on the left labia. Cervix exhibits no motion tenderness and no discharge. Right adnexum displays no mass and no tenderness. Left adnexum displays no mass and no tenderness.    No vaginal tenderness or bleeding.   No tenderness or bleeding in the vagina.   Musculoskeletal:      Cervical back: Full passive range of motion without pain.     Neurological: She  is alert.   Skin: Skin is warm, dry and intact. No rash noted.         ED Course   Procedures  Labs Reviewed   CBC W/ AUTO DIFFERENTIAL - Abnormal; Notable for the following components:       Result Value    RBC 3.84 (*)     MCV 99 (*)     MCH 33.3 (*)     Immature Granulocytes 0.7 (*)     Immature Grans (Abs) 0.06 (*)     All other components within normal limits   COMPREHENSIVE METABOLIC PANEL - Abnormal; Notable for the following components:    CO2 22 (*)     Alkaline Phosphatase 52 (*)     All other components within normal limits   URINALYSIS, REFLEX TO URINE CULTURE    Narrative:     Specimen Source->Urine   PREGNANCY TEST, URINE RAPID    Narrative:     Specimen Source->Urine   LIPASE          Imaging Results          X-Ray Abdomen Flat And Erect (Final result)  Result time 06/13/22 20:37:33    Final result by Maria Teresa Diego MD (06/13/22 20:37:33)                 Impression:      Mild right colon constipation.  No fecal distal impaction, evidence of free air or bowel dilatation.      Electronically signed by: Maria Teresa Diego  Date:    06/13/2022  Time:    20:37             Narrative:    EXAMINATION:  XR ABDOMEN FLAT AND ERECT    CLINICAL HISTORY:  Unspecified abdominal pain    TECHNIQUE:  Flat and erect AP views of the abdomen were performed.    COMPARISON:  06/07/2022 CT abdomen pelvis    FINDINGS:  There is mild constipation in the right colon.  There is no distal fecal impaction or bowel dilatation.  No evidence of free air seen beneath the diaphragm.  There is no organomegaly.  There are no abnormal calcifications overlying the upper urinary tract.  Multiple punctate calcifications are seen in the pelvis consistent with vascular calcifications as noted on CT.    Osseous structures grossly appear intact.                                 Medications   ketorolac injection 15 mg (15 mg Intravenous Given by Other 6/13/22 2045)     Medical Decision Making:   History:   Old Medical Records: I decided to  obtain old medical records.  Clinical Tests:   Lab Tests: Ordered and Reviewed  Radiological Study: Ordered and Reviewed       APC / Resident Notes:   Emergent evaluation of a 31-year-old female who presents with abdominal pain after finding out she had umbilical hernia approximately a week ago.  She reports of nausea and vomiting.  No change in bowel habits.  She reports she had episode of bleeding which she is unsure if it came from her vagina or from my hernia.  She denies any dark tarry stools.  She denies any rectal bleeding.  Vital signs are stable.  She is afebrile.  She denies urinary symptoms.  She has mild periumbilical tenderness to palpation.  She has a very small umbilical hernia with no overlying skin changes.  I have low suspicion for incarcerated hernia.  Labs show no leukocytosis.   exam shows no vaginal bleeding.  She had a recent swabs performed last week for gonorrhea chlamydia and vaginal screen.  UPT is negative.  She has no adnexal tenderness, CMT or discomfort on pelvic exam.  I doubt PID.  KUB shows no sign of small bowel obstruction. I doubt acute intra-abdominal process. Recommend she follow up with General surgery as planned. Discussed results with patient. Return precautions given. Based on my clinical evaluation, I do not appreciate any immediate, emergent, or life threatening condition or etiology that warrants additional workup today and feel that the patient can be discharged with close follow up care.  Patient is to follow up with their primary care provider. Case was discussed with Dr. Chavez who is in agreement with the plan of care. All questions answered.        Scribe Attestation:   Scribe #1: I performed the above scribed service and the documentation accurately describes the services I performed. I attest to the accuracy of the note.               I, Merary Lucia PA-C, personally performed the services described in this documentation. All medical record entries  made by the scribe were at my direction and in my presence.  I have reviewed the chart and agree that the record reflects my personal performance and is accurate and complete. Merary Lucia PA-C.  9:15 AM 06/14/2022    Clinical Impression:   Final diagnoses:  [R10.9] Abdominal pain (Primary)  [K59.00] Constipation, unspecified constipation type  [K42.9] Umbilical hernia without obstruction and without gangrene          ED Disposition Condition    Discharge Stable        ED Prescriptions     None        Follow-up Information     Follow up With Specialties Details Why Contact Info    [facility] Appointment Line  Schedule an appointment as soon as possible for a visit  For follow up if you don't have a primary care provider [phone]    Charanjit España MD General Surgery, Colon and Rectal Surgery, Surgery   [address]  [address]  [facility] [address]  [phone]      [facility] Emergency Dept Emergency Medicine  As needed [address] [place] [id number] [phone]           Merary Lucia PA-C  06/14/22 0915

## 2022-06-14 NOTE — DISCHARGE INSTRUCTIONS
Continue pain medication as prescribed.  Follow up with surgeon as planned.  For worsening symptoms, chest pain, shortness of breath, increased abdominal pain, high grade fever, stroke or stroke like symptoms, immediately go to the nearest Emergency Room or call 911 as soon as possible.

## 2022-06-16 ENCOUNTER — HOSPITAL ENCOUNTER (OUTPATIENT)
Dept: RADIOLOGY | Facility: HOSPITAL | Age: 32
Discharge: HOME | End: 2022-06-16
Attending: SURGERY
Payer: MEDICAID

## 2022-06-16 ENCOUNTER — OFFICE VISIT (OUTPATIENT)
Dept: SURGERY | Facility: CLINIC | Age: 32
End: 2022-06-16
Payer: MEDICAID

## 2022-06-16 ENCOUNTER — HOSPITAL ENCOUNTER (OUTPATIENT)
Dept: PREADMISSION TESTING | Facility: HOSPITAL | Age: 32
Discharge: HOME | End: 2022-06-16
Attending: SURGERY
Payer: MEDICAID

## 2022-06-16 VITALS
TEMPERATURE: 99 F | SYSTOLIC BLOOD PRESSURE: 114 MMHG | DIASTOLIC BLOOD PRESSURE: 77 MMHG | HEART RATE: 98 BPM | OXYGEN SATURATION: 98 % | RESPIRATION RATE: 16 BRPM | HEIGHT: 66 IN | BODY MASS INDEX: 34.87 KG/M2 | WEIGHT: 217 LBS

## 2022-06-16 VITALS — TEMPERATURE: 98 F

## 2022-06-16 DIAGNOSIS — K42.9 UMBILICAL HERNIA WITHOUT OBSTRUCTION AND WITHOUT GANGRENE: Primary | ICD-10-CM

## 2022-06-16 DIAGNOSIS — K42.9 UMBILICAL HERNIA WITHOUT OBSTRUCTION AND WITHOUT GANGRENE: ICD-10-CM

## 2022-06-16 DIAGNOSIS — Z01.818 PREOP TESTING: Primary | ICD-10-CM

## 2022-06-16 PROCEDURE — 71046 X-RAY EXAM CHEST 2 VIEWS: CPT | Mod: TC

## 2022-06-16 PROCEDURE — 1159F MED LIST DOCD IN RCRD: CPT | Mod: CPTII,S$GLB,, | Performed by: SURGERY

## 2022-06-16 PROCEDURE — 93005 ELECTROCARDIOGRAM TRACING: CPT | Performed by: SPECIALIST

## 2022-06-16 PROCEDURE — 1160F PR REVIEW ALL MEDS BY PRESCRIBER/CLIN PHARMACIST DOCUMENTED: ICD-10-PCS | Mod: CPTII,S$GLB,, | Performed by: SURGERY

## 2022-06-16 PROCEDURE — 99204 PR OFFICE/OUTPT VISIT, NEW, LEVL IV, 45-59 MIN: ICD-10-PCS | Mod: S$GLB,,, | Performed by: SURGERY

## 2022-06-16 PROCEDURE — 1159F PR MEDICATION LIST DOCUMENTED IN MEDICAL RECORD: ICD-10-PCS | Mod: CPTII,S$GLB,, | Performed by: SURGERY

## 2022-06-16 PROCEDURE — 1160F RVW MEDS BY RX/DR IN RCRD: CPT | Mod: CPTII,S$GLB,, | Performed by: SURGERY

## 2022-06-16 PROCEDURE — 99204 OFFICE O/P NEW MOD 45 MIN: CPT | Mod: S$GLB,,, | Performed by: SURGERY

## 2022-06-16 PROCEDURE — 93010 ELECTROCARDIOGRAM REPORT: CPT | Mod: ,,, | Performed by: SPECIALIST

## 2022-06-16 PROCEDURE — 93010 EKG 12-LEAD: ICD-10-PCS | Mod: ,,, | Performed by: SPECIALIST

## 2022-06-16 NOTE — DISCHARGE INSTRUCTIONS
To confirm, Your doctor has instructed you that surgery is scheduled for: 6/20/22    Pre-Op will call the afternoon prior to surgery between 4:00 and 6:00 PM with the final arrival time.      Please report to [facility] [place] via [facility] entrance. Check in at registration desk.    Do not eat or drink anything after midnight the night before your surgery - THIS INCLUDES  WATER, GUM, MINTS AND CANDY.  YOU MAY BRUSH YOUR TEETH BUT DO NOT SWALLOW     TAKE ONLY THESE MEDICATIONS WITH A SMALL SIP OF WATER THE MORNING OF YOUR PROCEDURE:      ONLY if you are diabetic, check your sugar in the morning before your procedure.       Do not take any diabetic medicines or insulin the morning of surgery .     PLEASE NOTE:  The surgery schedule has many variables which may affect the time of your surgery case.  Family members should be available if your surgery time changes.  Plan to be here the day of your procedure between 4-6 hours.      DO NOT TAKE THESE MEDICATIONS 5-7 DAYS PRIOR to your procedure or per your surgeon's request: ASPIRIN, ALEVE, ADVIL, IBUPROFEN,  JUAN SELTZER, BC , FISH OIL , VITAMIN E, HERBALS  (May take Tylenol)      ONLY if you are prescribed any types of blood thinners such as:  Aspirin, Coumadin, Plavix, Pradaxa, Xarelto, Aggrenox, Effient, Eliquis, Savasya, Brilinta, or any other, ask your surgeon whether you should stop taking them and how long before surgery you should stop.  You may also need to verify with the prescribing physician if it is ok to stop your medication.                                                        IMPORTANT INSTRUCTIONS      Do not smoke, vape or drink alcoholic beverages 24 hours prior to your procedure.  Shower the night before AND the morning of your procedure with a Chlorhexidine wash such as Hibiclens or Dial antibacterial soap from the neck down. Do not apply any deodorants, lotions or powders after each shower.  Do not get it on your face or in your eyes.  You may  use your own shampoo and face wash. This helps your skin to be as bacteria free as possible.  DO NOT remove hair from the surgery site.  Do not shave the incision site unless you are given specific instructions to do so.    Sleep in a bed with clean sheets.  Do not sleep with a pet in the bed.   If you wear contact lenses, dentures, hearing aids or glasses, bring a container to put them in during surgery and give to a family member for safe keeping.    Please leave all jewelry, piercing's and valuables at home.   ONLY if you have been diagnosed with sleep apnea please bring your C-PAP machine.  ONLY if you wear home oxygen please bring your portable oxygen tank the day of your procedure.   ONLY for patients requiring bowel prep, written instructions will be given by your doctor's office.  ONLY if you have a neuro stimulator, please bring the controller with you the morning of surgery                                                                                                                                                                                If your doctor has scheduled you for an overnight stay, bring a small overnight bag with any personal items you need.    Make arrangements in advance for transportation home by a responsible adult.      You must make arrangements for transportation, TAXI'S, UBER'S OR LYFTS ARE NOT ALLOWED.        If you have any questions about these instructions, call Pre-Op Admit  Nursing at [phone] or the Pre-Op Day Surgery Unit at [phone].

## 2022-06-16 NOTE — H&P (VIEW-ONLY)
Subjective:       Patient ID: Vanessa Guerra is a 31 y.o. female.    Chief Complaint: Hernia      HPI:  Patient is 31 year old female referred to the office with a small but very uncomfortable umbilical hernia. She first noticed some umbilical bulging after her last baby three years ago. She started having discomfort about a month ago. It has progressed in discomfort at the umbilicus. She had ED visit for the pain. CT imaging showed small umbilical hernia containing fat. She has no obstructive symptoms. She has no history of prior repair. She has past surgical history of  x 2.     Past Medical History:   Diagnosis Date    Diverticulosis     No pertinent past medical history      Past Surgical History:   Procedure Laterality Date     SECTION      SKIN GRAFT       Review of patient's allergies indicates:  No Known Allergies  Medication List with Changes/Refills   Current Medications    HYDROCODONE-ACETAMINOPHEN (NORCO)  MG PER TABLET    Take 1 tablet by mouth every 4 (four) hours as needed for Pain.    LACTULOSE (CHRONULAC) 20 GRAM/30 ML SOLN    Take 30 mLs (20 g total) by mouth once daily.    MEDROXYPROGESTERONE (DEPO-PROVERA) 150 MG/ML INJECTION    Inject 150 mg into the muscle every 3 (three) months. HCS    MULTIVIT,CALC,MINS/IRON/FOLIC (ONE-A-DAY WOMENS FORMULA ORAL)    Take 1 tablet by mouth once daily.     Family History   Problem Relation Age of Onset    Diabetes Mother     Hypertension Mother      Social History     Socioeconomic History    Marital status: Single   Tobacco Use    Smoking status: Current Every Day Smoker    Smokeless tobacco: Never Used   Substance and Sexual Activity    Alcohol use: Not Currently    Drug use: Never    Sexual activity: Yes     Partners: Male     Comment: Depot shot     Social Determinants of Health     Financial Resource Strain: Low Risk     Difficulty of Paying Living Expenses: Not hard at all   Food Insecurity: No Food Insecurity     Worried About Running Out of Food in the Last Year: Never true    Ran Out of Food in the Last Year: Never true   Transportation Needs: No Transportation Needs    Lack of Transportation (Medical): No    Lack of Transportation (Non-Medical): No   Stress: No Stress Concern Present    Feeling of Stress : Not at all   Social Connections: Unknown    Frequency of Communication with Friends and Family: Three times a week    Frequency of Social Gatherings with Friends and Family: Three times a week    Marital Status: Living with partner   Housing Stability: Unknown    Unable to Pay for Housing in the Last Year: No    Unstable Housing in the Last Year: No         Review of Systems   Constitutional: Negative for appetite change, chills, fever and unexpected weight change.   HENT: Negative for hearing loss, rhinorrhea, sore throat and voice change.    Eyes: Negative for photophobia and visual disturbance.   Respiratory: Negative for cough, choking and shortness of breath.    Cardiovascular: Negative for chest pain, palpitations and leg swelling.   Gastrointestinal: Positive for abdominal pain. Negative for blood in stool, constipation, diarrhea, nausea and vomiting.   Endocrine: Negative for cold intolerance, heat intolerance, polydipsia and polyuria.   Musculoskeletal: Negative for arthralgias, back pain, joint swelling and neck stiffness.   Skin: Negative for color change, pallor and rash.   Neurological: Negative for dizziness, seizures, syncope and headaches.   Hematological: Negative for adenopathy. Does not bruise/bleed easily.   Psychiatric/Behavioral: Negative for agitation, behavioral problems and confusion.       Objective:      Physical Exam  Constitutional:       General: She is not in acute distress.     Appearance: Normal appearance. She is well-developed. She is not toxic-appearing.   HENT:      Head: Normocephalic and atraumatic. No abrasion or laceration.      Right Ear: External ear normal.      Left  Ear: External ear normal.      Nose: Nose normal.   Eyes:      Pupils: Pupils are equal, round, and reactive to light.   Neck:      Trachea: Trachea normal. No tracheal deviation.   Cardiovascular:      Rate and Rhythm: Normal rate and regular rhythm.   Pulmonary:      Effort: Pulmonary effort is normal. No tachypnea, accessory muscle usage or respiratory distress.   Abdominal:      General: There is no distension.      Palpations: Abdomen is soft.      Tenderness: There is abdominal tenderness in the periumbilical area.      Hernia: A hernia is present. Hernia is present in the umbilical area.          Comments: Small umbilical hernia.  Feels to be about a cm defect.    Musculoskeletal:      Cervical back: Neck supple. Normal range of motion.   Lymphadenopathy:      Cervical: No cervical adenopathy.   Skin:     General: Skin is warm.   Neurological:      Mental Status: She is alert.      Coordination: Coordination normal.      Gait: Gait normal.   Psychiatric:         Speech: Speech normal.         Behavior: Behavior normal.         Assessment/Plan:   Vanessa was seen today for hernia.    Diagnoses and all orders for this visit:    Umbilical hernia without obstruction and without gangrene  -     Ambulatory referral/consult to General Surgery  -     Case Request Operating Room: REPAIR, HERNIA, UMBILICAL, AGE 5 YEARS OR OLDER  -     EKG 12-lead; Future  -     X-Ray Chest PA And Lateral; Future    Other orders  -     Full code; Standing  -     Vital signs; Standing  -     Insert peripheral IV; Standing  -     Diet NPO; Standing  -     IP VTE LOW RISK PATIENT; Standing  -     Place sequential compression device; Standing  -     ceFAZolin (ANCEF) 2 g in dextrose 5 % 50 mL IVPB  -     Pulse Oximetry Q4H; Standing  -     Place in Outpatient; Standing  -     Full code  -     Insert peripheral IV        will plan for umbilical hernia repair this Monday.       I discussed the proposed procedures with the patient including  risks, benefits, indications, alternatives and special concerns.  The patient appears to understand and agrees to go ahead with surgery.  I have made no promises, warranties or verbal agreements beyond what was discussed above.

## 2022-06-20 ENCOUNTER — ANESTHESIA EVENT (OUTPATIENT)
Dept: SURGERY | Facility: HOSPITAL | Age: 32
End: 2022-06-20
Payer: MEDICAID

## 2022-06-20 ENCOUNTER — HOSPITAL ENCOUNTER (OUTPATIENT)
Facility: HOSPITAL | Age: 32
Discharge: HOME | End: 2022-06-20
Attending: SURGERY | Admitting: SURGERY
Payer: MEDICAID

## 2022-06-20 ENCOUNTER — ANESTHESIA (OUTPATIENT)
Dept: SURGERY | Facility: HOSPITAL | Age: 32
End: 2022-06-20
Payer: MEDICAID

## 2022-06-20 VITALS
TEMPERATURE: 98 F | OXYGEN SATURATION: 99 % | HEIGHT: 66 IN | BODY MASS INDEX: 34.87 KG/M2 | DIASTOLIC BLOOD PRESSURE: 81 MMHG | SYSTOLIC BLOOD PRESSURE: 113 MMHG | WEIGHT: 217 LBS | HEART RATE: 77 BPM | RESPIRATION RATE: 16 BRPM

## 2022-06-20 DIAGNOSIS — K42.9 UMBILICAL HERNIA WITHOUT OBSTRUCTION AND WITHOUT GANGRENE: Primary | ICD-10-CM

## 2022-06-20 PROCEDURE — 71000039 HC RECOVERY, EACH ADD'L HOUR: Performed by: SURGERY

## 2022-06-20 PROCEDURE — 71000033 HC RECOVERY, INTIAL HOUR: Performed by: SURGERY

## 2022-06-20 PROCEDURE — 71000015 HC POSTOP RECOV 1ST HR: Performed by: SURGERY

## 2022-06-20 PROCEDURE — 49585 PR REPAIR UMBILICAL HERN,5+Y/O,REDUC: ICD-10-PCS | Mod: ,,, | Performed by: SURGERY

## 2022-06-20 PROCEDURE — 27000656 HC EYE GOGGLES: Performed by: ANESTHESIOLOGY

## 2022-06-20 PROCEDURE — 00830 ANES HERNIA RPR LWR ABD NOS: CPT | Performed by: SURGERY

## 2022-06-20 PROCEDURE — 37000008 HC ANESTHESIA 1ST 15 MINUTES: Performed by: SURGERY

## 2022-06-20 PROCEDURE — 27000673 HC TUBING BLOOD Y: Performed by: ANESTHESIOLOGY

## 2022-06-20 PROCEDURE — 63600175 PHARM REV CODE 636 W HCPCS: Performed by: NURSE ANESTHETIST, CERTIFIED REGISTERED

## 2022-06-20 PROCEDURE — 49585 PR REPAIR UMBILICAL HERN,5+Y/O,REDUC: CPT | Mod: ,,, | Performed by: SURGERY

## 2022-06-20 PROCEDURE — 27201423 OPTIME MED/SURG SUP & DEVICES STERILE SUPPLY: Performed by: SURGERY

## 2022-06-20 PROCEDURE — 25000003 PHARM REV CODE 250: Performed by: SURGERY

## 2022-06-20 PROCEDURE — 27202107 HC XP QUATRO SENSOR: Performed by: ANESTHESIOLOGY

## 2022-06-20 PROCEDURE — 37000009 HC ANESTHESIA EA ADD 15 MINS: Performed by: SURGERY

## 2022-06-20 PROCEDURE — 25000003 PHARM REV CODE 250: Performed by: NURSE ANESTHETIST, CERTIFIED REGISTERED

## 2022-06-20 PROCEDURE — 36000706: Performed by: SURGERY

## 2022-06-20 PROCEDURE — 36000707: Performed by: SURGERY

## 2022-06-20 RX ORDER — BUPIVACAINE HYDROCHLORIDE AND EPINEPHRINE 2.5; 5 MG/ML; UG/ML
INJECTION, SOLUTION EPIDURAL; INFILTRATION; INTRACAUDAL; PERINEURAL
Status: DISCONTINUED | OUTPATIENT
Start: 2022-06-20 | End: 2022-06-20 | Stop reason: HOSPADM

## 2022-06-20 RX ORDER — FAMOTIDINE 10 MG/ML
INJECTION INTRAVENOUS
Status: DISCONTINUED | OUTPATIENT
Start: 2022-06-20 | End: 2022-06-20

## 2022-06-20 RX ORDER — CEFAZOLIN SODIUM 2 G/50ML
2 SOLUTION INTRAVENOUS
Status: DISCONTINUED | OUTPATIENT
Start: 2022-06-20 | End: 2022-06-20 | Stop reason: HOSPADM

## 2022-06-20 RX ORDER — FENTANYL CITRATE 50 UG/ML
INJECTION, SOLUTION INTRAMUSCULAR; INTRAVENOUS
Status: DISCONTINUED | OUTPATIENT
Start: 2022-06-20 | End: 2022-06-20

## 2022-06-20 RX ORDER — HYDROCODONE BITARTRATE AND ACETAMINOPHEN 5; 325 MG/1; MG/1
1 TABLET ORAL EVERY 6 HOURS PRN
Qty: 25 TABLET | Refills: 0 | OUTPATIENT
Start: 2022-06-20 | End: 2023-08-07

## 2022-06-20 RX ORDER — PROPOFOL 10 MG/ML
VIAL (ML) INTRAVENOUS
Status: DISCONTINUED | OUTPATIENT
Start: 2022-06-20 | End: 2022-06-20

## 2022-06-20 RX ORDER — SUCCINYLCHOLINE CHLORIDE 20 MG/ML
INJECTION INTRAMUSCULAR; INTRAVENOUS
Status: DISCONTINUED | OUTPATIENT
Start: 2022-06-20 | End: 2022-06-20

## 2022-06-20 RX ORDER — MEPERIDINE HYDROCHLORIDE 50 MG/ML
12.5 INJECTION INTRAMUSCULAR; INTRAVENOUS; SUBCUTANEOUS EVERY 10 MIN PRN
Status: DISCONTINUED | OUTPATIENT
Start: 2022-06-20 | End: 2022-06-20 | Stop reason: HOSPADM

## 2022-06-20 RX ORDER — LIDOCAINE HYDROCHLORIDE 20 MG/ML
INJECTION, SOLUTION EPIDURAL; INFILTRATION; INTRACAUDAL; PERINEURAL
Status: DISCONTINUED | OUTPATIENT
Start: 2022-06-20 | End: 2022-06-20

## 2022-06-20 RX ORDER — OXYCODONE HYDROCHLORIDE 5 MG/1
5 TABLET ORAL
Status: DISCONTINUED | OUTPATIENT
Start: 2022-06-20 | End: 2022-06-20 | Stop reason: HOSPADM

## 2022-06-20 RX ORDER — ROCURONIUM BROMIDE 10 MG/ML
INJECTION, SOLUTION INTRAVENOUS
Status: DISCONTINUED | OUTPATIENT
Start: 2022-06-20 | End: 2022-06-20

## 2022-06-20 RX ORDER — ONDANSETRON 2 MG/ML
4 INJECTION INTRAMUSCULAR; INTRAVENOUS DAILY PRN
Status: DISCONTINUED | OUTPATIENT
Start: 2022-06-20 | End: 2022-06-20 | Stop reason: HOSPADM

## 2022-06-20 RX ORDER — SODIUM CHLORIDE 0.9 % (FLUSH) 0.9 %
10 SYRINGE (ML) INJECTION
Status: DISCONTINUED | OUTPATIENT
Start: 2022-06-20 | End: 2022-06-20 | Stop reason: HOSPADM

## 2022-06-20 RX ORDER — ESMOLOL HYDROCHLORIDE 10 MG/ML
INJECTION INTRAVENOUS
Status: DISCONTINUED | OUTPATIENT
Start: 2022-06-20 | End: 2022-06-20

## 2022-06-20 RX ORDER — MIDAZOLAM HYDROCHLORIDE 1 MG/ML
INJECTION INTRAMUSCULAR; INTRAVENOUS
Status: DISCONTINUED | OUTPATIENT
Start: 2022-06-20 | End: 2022-06-20

## 2022-06-20 RX ORDER — ACETAMINOPHEN 10 MG/ML
INJECTION, SOLUTION INTRAVENOUS
Status: DISCONTINUED | OUTPATIENT
Start: 2022-06-20 | End: 2022-06-20

## 2022-06-20 RX ORDER — DIPHENHYDRAMINE HYDROCHLORIDE 50 MG/ML
12.5 INJECTION INTRAMUSCULAR; INTRAVENOUS
Status: DISCONTINUED | OUTPATIENT
Start: 2022-06-20 | End: 2022-06-20 | Stop reason: HOSPADM

## 2022-06-20 RX ORDER — ONDANSETRON 2 MG/ML
INJECTION INTRAMUSCULAR; INTRAVENOUS
Status: DISCONTINUED | OUTPATIENT
Start: 2022-06-20 | End: 2022-06-20

## 2022-06-20 RX ORDER — HYDROMORPHONE HYDROCHLORIDE 1 MG/ML
0.2 INJECTION, SOLUTION INTRAMUSCULAR; INTRAVENOUS; SUBCUTANEOUS EVERY 5 MIN PRN
Status: DISCONTINUED | OUTPATIENT
Start: 2022-06-20 | End: 2022-06-20 | Stop reason: HOSPADM

## 2022-06-20 RX ADMIN — ROCURONIUM BROMIDE 40 MG: 10 INJECTION, SOLUTION INTRAVENOUS at 08:06

## 2022-06-20 RX ADMIN — MIDAZOLAM HYDROCHLORIDE 2 MG: 1 INJECTION, SOLUTION INTRAMUSCULAR; INTRAVENOUS at 08:06

## 2022-06-20 RX ADMIN — ONDANSETRON 4 MG: 2 INJECTION INTRAMUSCULAR; INTRAVENOUS at 08:06

## 2022-06-20 RX ADMIN — FENTANYL CITRATE 100 MCG: 50 INJECTION INTRAMUSCULAR; INTRAVENOUS at 09:06

## 2022-06-20 RX ADMIN — FENTANYL CITRATE 100 MCG: 50 INJECTION INTRAMUSCULAR; INTRAVENOUS at 08:06

## 2022-06-20 RX ADMIN — PROPOFOL 150 MG: 10 INJECTION, EMULSION INTRAVENOUS at 08:06

## 2022-06-20 RX ADMIN — ACETAMINOPHEN 1000 MG: 10 INJECTION, SOLUTION INTRAVENOUS at 08:06

## 2022-06-20 RX ADMIN — LIDOCAINE HYDROCHLORIDE 80 MG: 20 INJECTION, SOLUTION INTRAVENOUS at 08:06

## 2022-06-20 RX ADMIN — DEXTROSE 2 G: 50 INJECTION, SOLUTION INTRAVENOUS at 08:06

## 2022-06-20 RX ADMIN — SUCCINYLCHOLINE CHLORIDE 120 MG: 20 INJECTION, SOLUTION INTRAMUSCULAR; INTRAVENOUS at 08:06

## 2022-06-20 RX ADMIN — SODIUM CHLORIDE, SODIUM LACTATE, POTASSIUM CHLORIDE, AND CALCIUM CHLORIDE: .6; .31; .03; .02 INJECTION, SOLUTION INTRAVENOUS at 08:06

## 2022-06-20 RX ADMIN — SUGAMMADEX 200 MG: 100 INJECTION, SOLUTION INTRAVENOUS at 09:06

## 2022-06-20 RX ADMIN — ROCURONIUM BROMIDE 10 MG: 10 INJECTION, SOLUTION INTRAVENOUS at 08:06

## 2022-06-20 RX ADMIN — ESMOLOL HYDROCHLORIDE 20 MG: 10 INJECTION, SOLUTION INTRAVENOUS at 09:06

## 2022-06-20 RX ADMIN — FAMOTIDINE 20 MG: 10 INJECTION, SOLUTION INTRAVENOUS at 08:06

## 2022-06-20 NOTE — DISCHARGE INSTRUCTIONS
DISCHARGE INSTRUCTIONS    No driving, operating heavy machinery or signing legal documents x 24 hours  No drinking alcohol x 24 hours or while taking pain medication  You should not be driving while taking pain medication  May remove dressing in 48 hours and shower. Leave wound open to air after.  Do Not submerge wound in a tub, any pools of water or swimming pools until wound is completely healed  Keep an eye on wound- edges should be pink and well approximated-  the wound should not be red and inflamed.  Wound edges should not be .   Your wound should not be draining anything green, yellow or foul smelling- for these call the MD  You should not be running a temp greater than 101   No heavy lifting  nothing over 20 lbs for 6 weeks

## 2022-06-20 NOTE — ANESTHESIA PROCEDURE NOTES
Intubation    Date/Time: 6/20/2022 8:54 AM  Performed by: Matteo Lozano CRNA  Authorized by: Manas Daniel MD     Intubation:     Induction:  Intravenous    Intubated:  Postinduction    Mask Ventilation:  Easy mask    Attempts:  1    Attempted By:  CRNA    Method of Intubation:  Video laryngoscopy    Blade:  Lundy 3    Laryngeal View Grade: Grade I - full view of cords      Difficult Airway Encountered?: No      Complications:  None    Airway Device:  Oral endotracheal tube    Airway Device Size:  7.5    Style/Cuff Inflation:  Cuffed (inflated to minimal occlusive pressure)    Tube secured:  21    Secured at:  The lips    Placement Verified By:  Capnometry and Revisualization with laryngoscopy    Complicating Factors:  None    Findings Post-Intubation:  BS equal bilateral

## 2022-06-20 NOTE — ANESTHESIA POSTPROCEDURE EVALUATION
Anesthesia Post Evaluation    Patient: Vanessa Guerra    Procedure(s) Performed: Procedure(s) (LRB):  REPAIR, HERNIA, UMBILICAL (N/A)    Final Anesthesia Type: general      Patient location during evaluation: PACU  Patient participation: Yes- Able to Participate  Level of consciousness: awake and alert, oriented and awake  Post-procedure vital signs: reviewed and stable  Pain management: adequate  Airway patency: patent    PONV status at discharge: No PONV  Anesthetic complications: no      Cardiovascular status: blood pressure returned to baseline, hemodynamically stable and stable  Respiratory status: unassisted, spontaneous ventilation and room air  Hydration status: euvolemic  Follow-up not needed.          Vitals Value Taken Time   BP 98/64 06/20/22 1045   Temp 36.5 °C (97.7 °F) 06/20/22 1030   Pulse 74 06/20/22 1045   Resp 24 06/20/22 1045   SpO2 100 % 06/20/22 1045   Vitals shown include unvalidated device data.      Event Time   Out of Recovery 10:46:13         Pain/Jose Alberto Score: Jose Alberto Score: 10 (6/20/2022 10:30 AM)

## 2022-06-20 NOTE — TRANSFER OF CARE
"Anesthesia Transfer of Care Note    Patient: Vanessa Guerra    Procedure(s) Performed: Procedure(s) (LRB):  REPAIR, HERNIA, UMBILICAL (N/A)    Patient location: PACU    Anesthesia Type: general    Transport from OR: Transported from OR on room air with adequate spontaneous ventilation    Post pain: adequate analgesia    Post assessment: no apparent anesthetic complications    Post vital signs: stable    Level of consciousness: awake and alert    Nausea/Vomiting: no nausea/vomiting    Complications: none    Transfer of care protocol was followed      Last vitals:   Visit Vitals  /76 (BP Location: Right arm, Patient Position: Lying)   Pulse 85   Temp 36.8 °C (98.3 °F) (Oral)   Resp 16   Ht 5' 6" (1.676 m)   Wt 98.4 kg (217 lb)   SpO2 (!) 94%   Breastfeeding No   BMI 35.02 kg/m²     "

## 2022-06-20 NOTE — ANESTHESIA PREPROCEDURE EVALUATION
2022  Vanessa Guerra is a 31 y.o., female.      Patient Active Problem List   Diagnosis   (none) - all problems resolved or deleted       Past Surgical History:   Procedure Laterality Date     SECTION      SKIN GRAFT          Tobacco Use:  The patient  reports that she has been smoking cigarettes. She has been smoking about 0.25 packs per day. She has never used smokeless tobacco.     Results for orders placed or performed during the hospital encounter of 22   EKG 12-lead    Collection Time: 22  2:26 PM    Narrative    Test Reason : K42.9,    Vent. Rate : 087 BPM     Atrial Rate : 087 BPM     P-R Int : 134 ms          QRS Dur : 072 ms      QT Int : 362 ms       P-R-T Axes : 049 035 023 degrees     QTc Int : 435 ms    Normal sinus rhythm  Normal ECG  When compared with ECG of 05-DEC-2021 07:50,  Fusion complexes are no longer Present  T wave amplitude has decreased in Anterior-lateral leads  Confirmed by Angelo Resendiz MD ([id number]) on 2022 9:13:47 PM    Referred By:             Confirmed By:Angelo Resendiz MD             Lab Results   Component Value Date    WBC 9.05 2022    HGB 12.8 2022    HCT 38.0 2022    MCV 99 (H) 2022     2022     BMP  Lab Results   Component Value Date     2022    K 4.2 2022     2022    CO2 22 (L) 2022    BUN 13 2022    CREATININE 0.9 2022    CALCIUM 8.8 2022    ANIONGAP 8 2022    GLU 89 2022    GLU 92 2022     2022       No results found for this or any previous visit.            Pre-op Assessment    I have reviewed the Patient Summary Reports.     I have reviewed the Nursing Notes. I have reviewed the NPO Status.   I have reviewed the Medications.     Review of Systems  Anesthesia Hx:  No problems with previous Anesthesia Denies  Hx of Anesthetic complications  Denies Family Hx of Anesthesia complications.   Denies Personal Hx of Anesthesia complications.   Social:  Smoker    Hematology/Oncology:  Hematology Normal   Oncology Normal     EENT/Dental:EENT/Dental Normal   Cardiovascular:  Cardiovascular Normal  ECG has been reviewed.    Pulmonary:  Pulmonary Normal    Renal/:  Renal/ Normal     Hepatic/GI:  Hepatic/GI Normal    Musculoskeletal:  Musculoskeletal Normal    Neurological:  Neurology Normal    Endocrine:  Endocrine Normal    Psych:  Psychiatric Normal           Physical Exam  General: Well nourished    Airway:  Mallampati: II   Mouth Opening: Normal  TM Distance: Normal  Tongue: Normal  Neck ROM: Normal ROM    Dental:  Intact    Chest/Lungs:  Clear to auscultation    Heart:  Rate: Normal  Rhythm: Regular Rhythm  Sounds: Normal        Anesthesia Plan  Type of Anesthesia, risks & benefits discussed:    Anesthesia Type: Gen ETT  Intra-op Monitoring Plan: Standard ASA Monitors  Post Op Pain Control Plan: multimodal analgesia  Induction:  IV  Airway Plan: Video and Direct  Informed Consent: Informed consent signed with the Patient and all parties understand the risks and agree with anesthesia plan.  All questions answered.   ASA Score: 2    Ready For Surgery From Anesthesia Perspective.     .

## 2022-06-21 ENCOUNTER — PATIENT MESSAGE (OUTPATIENT)
Dept: SURGERY | Facility: CLINIC | Age: 32
End: 2022-06-21
Payer: MEDICAID

## 2022-06-21 NOTE — BRIEF OP NOTE
[facility]  Brief Operative Note    SUMMARY     Surgery Date: 6/20/2022     Surgeon(s) and Role:     * Shahram Henderson III, MD - Primary    Assisting Surgeon: None    Pre-op Diagnosis:  Umbilical hernia without obstruction and without gangrene [K42.9]    Post-op Diagnosis:  Post-Op Diagnosis Codes:     * Umbilical hernia without obstruction and without gangrene [K42.9]    Procedure(s) (LRB):  REPAIR, HERNIA, UMBILICAL, AGE 5 YEARS OR OLDER (N/A)    Anesthesia: General    Operative Findings: The patient was taken to the operating room and transferred to the operating room table in the supine position.  The patient was given general anesthesia and intubated.  Abdomen was sterilely prepped and draped.  An infra-umbilical transverse  incision was made.  Dissection was carried down through the skin and subcutaneous tissue.  The umbilical stalk was dissected out 360 degrees.  The stalk was amputated near the skin level.  This exposed the umbilical hernia.  It was an 8 mm - 10 mm defect.  There was only fat in the hernia sac.  THere was no bowel compromise.  The fascial defect was approximated with interrupted Ethibond suture.  The appearance of a belly button was recreated by suturing the umbilical skin to the underlying fascia.  The skin was then closed with 4-0 Monocryl. She was extubated and brought to the recovery room in stable condition.      Estimated Blood Loss: 5 mL    Estimated Blood Loss has been documented.         Specimens:   Specimen (24h ago, onward)            None          [id number]

## 2022-06-21 NOTE — DISCHARGE SUMMARY
Discharge Summary  General Surgery      Admit Date: 6/20/2022    Discharge Date :6/20/2022    Attending Physician: Shahram Henderson III     Discharge Physician: Shahram Henderson III    Discharged Condition: good    Discharge Diagnosis: Umbilical hernia without obstruction and without gangrene [K42.9]    Treatments/Procedures: Procedure(s) (LRB):  REPAIR, HERNIA, UMBILICAL, AGE 5 YEARS OR OLDER (N/A)    Hospital Course: Uneventful; Discharged home from Recovery    Significant Diagnostic Studies: none    Disposition: Home or Self Care    Diet: Regular    Follow up: Office 10-14 days    Activity: No heavy lifting till seen in office.    Patient Instructions:   Discharge Medication List as of 6/20/2022 11:07 AM      START taking these medications    Details   HYDROcodone-acetaminophen (NORCO) 5-325 mg per tablet Take 1 tablet by mouth every 6 (six) hours as needed for Pain., Starting Mon 6/20/2022, Normal         CONTINUE these medications which have NOT CHANGED    Details   HYDROcodone-acetaminophen (NORCO)  mg per tablet Take 1 tablet by mouth every 4 (four) hours as needed for Pain., Starting Tue 6/7/2022, Print      medroxyPROGESTERone (DEPO-PROVERA) 150 mg/mL injection Inject 150 mg into the muscle every 3 (three) months. HCS, Historical Med      multivit,calc,mins/iron/folic (ONE-A-DAY WOMENS FORMULA ORAL) Take 1 tablet by mouth once daily., Historical Med             Discharge Procedure Orders   Diet Adult Regular     Lifting restrictions   Order Comments: No lifting over twenty pounds for six weeks     Remove dressing in 48 hours

## 2022-06-21 NOTE — OP NOTE
Surgery Date: 6/20/2022     Surgeon(s) and Role:     * Shahram Henderson III, MD - Primary    Assisting Surgeon: None    Pre-op Diagnosis:  Umbilical hernia without obstruction and without gangrene [K42.9]    Post-op Diagnosis:  Post-Op Diagnosis Codes:     * Umbilical hernia without obstruction and without gangrene [K42.9]    Procedure(s) (LRB):  REPAIR, HERNIA, UMBILICAL, AGE 5 YEARS OR OLDER (N/A)    Anesthesia: General    Operative Findings: The patient was taken to the operating room and transferred to the operating room table in the supine position.  The patient was given general anesthesia and intubated.  Abdomen was sterilely prepped and draped.  An infra-umbilical transverse  incision was made.  Dissection was carried down through the skin and subcutaneous tissue.  The umbilical stalk was dissected out 360 degrees.  The stalk was amputated near the skin level.  This exposed the umbilical hernia.  It was an 8 mm - 10 mm defect.  There was only fat in the hernia sac.  THere was no bowel compromise.  The fascial defect was approximated with interrupted Ethibond suture.  The appearance of a belly button was recreated by suturing the umbilical skin to the underlying fascia.  The skin was then closed with 4-0 Monocryl. She was extubated and brought to the recovery room in stable condition.      Estimated Blood Loss: 5 mL    Estimated Blood Loss has been documented.         Specimens:   Specimen (24h ago, onward)            None          [id number]

## 2022-06-22 ENCOUNTER — PATIENT MESSAGE (OUTPATIENT)
Dept: SURGERY | Facility: CLINIC | Age: 32
End: 2022-06-22
Payer: MEDICAID

## 2022-07-01 ENCOUNTER — PATIENT MESSAGE (OUTPATIENT)
Dept: SURGERY | Facility: CLINIC | Age: 32
End: 2022-07-01
Payer: MEDICAID

## 2022-07-07 ENCOUNTER — OFFICE VISIT (OUTPATIENT)
Dept: SURGERY | Facility: CLINIC | Age: 32
End: 2022-07-07
Payer: MEDICAID

## 2022-07-07 VITALS
HEART RATE: 88 BPM | WEIGHT: 217 LBS | TEMPERATURE: 97 F | BODY MASS INDEX: 34.87 KG/M2 | DIASTOLIC BLOOD PRESSURE: 73 MMHG | SYSTOLIC BLOOD PRESSURE: 132 MMHG | HEIGHT: 66 IN

## 2022-07-07 DIAGNOSIS — Z98.890 POST-OPERATIVE STATE: Primary | ICD-10-CM

## 2022-07-07 PROCEDURE — 99024 POSTOP FOLLOW-UP VISIT: CPT | Mod: S$GLB,,, | Performed by: PHYSICIAN ASSISTANT

## 2022-07-07 PROCEDURE — 3078F DIAST BP <80 MM HG: CPT | Mod: CPTII,S$GLB,, | Performed by: PHYSICIAN ASSISTANT

## 2022-07-07 PROCEDURE — 3075F PR MOST RECENT SYSTOLIC BLOOD PRESS GE 130-139MM HG: ICD-10-PCS | Mod: CPTII,S$GLB,, | Performed by: PHYSICIAN ASSISTANT

## 2022-07-07 PROCEDURE — 3008F BODY MASS INDEX DOCD: CPT | Mod: CPTII,S$GLB,, | Performed by: PHYSICIAN ASSISTANT

## 2022-07-07 PROCEDURE — 3008F PR BODY MASS INDEX (BMI) DOCUMENTED: ICD-10-PCS | Mod: CPTII,S$GLB,, | Performed by: PHYSICIAN ASSISTANT

## 2022-07-07 PROCEDURE — 1159F MED LIST DOCD IN RCRD: CPT | Mod: CPTII,S$GLB,, | Performed by: PHYSICIAN ASSISTANT

## 2022-07-07 PROCEDURE — 3078F PR MOST RECENT DIASTOLIC BLOOD PRESSURE < 80 MM HG: ICD-10-PCS | Mod: CPTII,S$GLB,, | Performed by: PHYSICIAN ASSISTANT

## 2022-07-07 PROCEDURE — 3075F SYST BP GE 130 - 139MM HG: CPT | Mod: CPTII,S$GLB,, | Performed by: PHYSICIAN ASSISTANT

## 2022-07-07 PROCEDURE — 99024 PR POST-OP FOLLOW-UP VISIT: ICD-10-PCS | Mod: S$GLB,,, | Performed by: PHYSICIAN ASSISTANT

## 2022-07-07 PROCEDURE — 1159F PR MEDICATION LIST DOCUMENTED IN MEDICAL RECORD: ICD-10-PCS | Mod: CPTII,S$GLB,, | Performed by: PHYSICIAN ASSISTANT

## 2022-07-07 NOTE — PROGRESS NOTES
Vanessa Guerra is status post umbilical hernia repair and presents today for follow-up care.  She has the following systemic symptoms or complaints: no complaints.     PE: Abdomen is soft, non-tender, non-distended.  Incisions clean, dry, and intact. No signs of infection.    Pathology: na     A/P:  Normal post-operative course.    The patient is instructed to avoid heavy lifting until 6 weeks after the surgery date.  Return to clinic as needed

## 2022-10-03 ENCOUNTER — HOSPITAL ENCOUNTER (EMERGENCY)
Facility: HOSPITAL | Age: 32
Discharge: HOME | End: 2022-10-03
Attending: EMERGENCY MEDICINE
Payer: MEDICAID

## 2022-10-03 VITALS
HEIGHT: 66 IN | WEIGHT: 200 LBS | DIASTOLIC BLOOD PRESSURE: 79 MMHG | TEMPERATURE: 99 F | BODY MASS INDEX: 32.14 KG/M2 | SYSTOLIC BLOOD PRESSURE: 123 MMHG | RESPIRATION RATE: 18 BRPM | HEART RATE: 86 BPM | OXYGEN SATURATION: 100 %

## 2022-10-03 DIAGNOSIS — K52.9 COLITIS: ICD-10-CM

## 2022-10-03 DIAGNOSIS — R10.32 LEFT LOWER QUADRANT ABDOMINAL PAIN: Primary | ICD-10-CM

## 2022-10-03 LAB
ALBUMIN SERPL BCP-MCNC: 3.9 G/DL (ref 3.5–5.2)
ALP SERPL-CCNC: 53 U/L (ref 55–135)
ALT SERPL W/O P-5'-P-CCNC: 16 U/L (ref 10–44)
ANION GAP SERPL CALC-SCNC: 7 MMOL/L (ref 8–16)
AST SERPL-CCNC: 17 U/L (ref 10–40)
B-HCG UR QL: NEGATIVE
BACTERIA #/AREA URNS HPF: NEGATIVE /HPF
BASOPHILS # BLD AUTO: 0.04 K/UL (ref 0–0.2)
BASOPHILS NFR BLD: 0.5 % (ref 0–1.9)
BILIRUB SERPL-MCNC: 0.2 MG/DL (ref 0.1–1)
BILIRUB UR QL STRIP: NEGATIVE
BUN SERPL-MCNC: 13 MG/DL (ref 6–20)
CALCIUM SERPL-MCNC: 8.7 MG/DL (ref 8.7–10.5)
CHLORIDE SERPL-SCNC: 105 MMOL/L (ref 95–110)
CLARITY UR: CLEAR
CO2 SERPL-SCNC: 23 MMOL/L (ref 23–29)
COLOR UR: YELLOW
CREAT SERPL-MCNC: 0.8 MG/DL (ref 0.5–1.4)
CTP QC/QA: YES
DIFFERENTIAL METHOD: ABNORMAL
EOSINOPHIL # BLD AUTO: 0.3 K/UL (ref 0–0.5)
EOSINOPHIL NFR BLD: 3.9 % (ref 0–8)
ERYTHROCYTE [DISTWIDTH] IN BLOOD BY AUTOMATED COUNT: 12 % (ref 11.5–14.5)
EST. GFR  (NO RACE VARIABLE): >60 ML/MIN/1.73 M^2
GLUCOSE SERPL-MCNC: 88 MG/DL (ref 70–110)
GLUCOSE UR QL STRIP: NEGATIVE
HCT VFR BLD AUTO: 38.2 % (ref 37–48.5)
HGB BLD-MCNC: 12.5 G/DL (ref 12–16)
HGB UR QL STRIP: NEGATIVE
HYALINE CASTS #/AREA URNS LPF: 3 /LPF
IMM GRANULOCYTES # BLD AUTO: 0.03 K/UL (ref 0–0.04)
IMM GRANULOCYTES NFR BLD AUTO: 0.4 % (ref 0–0.5)
KETONES UR QL STRIP: NEGATIVE
LEUKOCYTE ESTERASE UR QL STRIP: ABNORMAL
LYMPHOCYTES # BLD AUTO: 2.9 K/UL (ref 1–4.8)
LYMPHOCYTES NFR BLD: 37.3 % (ref 18–48)
MCH RBC QN AUTO: 32.3 PG (ref 27–31)
MCHC RBC AUTO-ENTMCNC: 32.7 G/DL (ref 32–36)
MCV RBC AUTO: 99 FL (ref 82–98)
MICROSCOPIC COMMENT: ABNORMAL
MONOCYTES # BLD AUTO: 0.4 K/UL (ref 0.3–1)
MONOCYTES NFR BLD: 4.6 % (ref 4–15)
NEUTROPHILS # BLD AUTO: 4.1 K/UL (ref 1.8–7.7)
NEUTROPHILS NFR BLD: 53.3 % (ref 38–73)
NITRITE UR QL STRIP: NEGATIVE
NRBC BLD-RTO: 0 /100 WBC
PH UR STRIP: 6 [PH] (ref 5–8)
PLATELET # BLD AUTO: 303 K/UL (ref 150–450)
PMV BLD AUTO: 9.4 FL (ref 9.2–12.9)
POTASSIUM SERPL-SCNC: 3.6 MMOL/L (ref 3.5–5.1)
PROT SERPL-MCNC: 7.3 G/DL (ref 6–8.4)
PROT UR QL STRIP: NEGATIVE
RBC # BLD AUTO: 3.87 M/UL (ref 4–5.4)
RBC #/AREA URNS HPF: 1 /HPF (ref 0–4)
SODIUM SERPL-SCNC: 135 MMOL/L (ref 136–145)
SP GR UR STRIP: 1.02 (ref 1–1.03)
SQUAMOUS #/AREA URNS HPF: 1 /HPF
URN SPEC COLLECT METH UR: ABNORMAL
UROBILINOGEN UR STRIP-ACNC: NEGATIVE EU/DL
WBC # BLD AUTO: 7.64 K/UL (ref 3.9–12.7)
WBC #/AREA URNS HPF: 8 /HPF (ref 0–5)

## 2022-10-03 PROCEDURE — S0030 INJECTION, METRONIDAZOLE: HCPCS | Performed by: EMERGENCY MEDICINE

## 2022-10-03 PROCEDURE — 81001 URINALYSIS AUTO W/SCOPE: CPT | Performed by: NURSE PRACTITIONER

## 2022-10-03 PROCEDURE — 80053 COMPREHEN METABOLIC PANEL: CPT | Performed by: NURSE PRACTITIONER

## 2022-10-03 PROCEDURE — 96365 THER/PROPH/DIAG IV INF INIT: CPT

## 2022-10-03 PROCEDURE — 85025 COMPLETE CBC W/AUTO DIFF WBC: CPT | Performed by: NURSE PRACTITIONER

## 2022-10-03 PROCEDURE — 25500020 PHARM REV CODE 255: Performed by: EMERGENCY MEDICINE

## 2022-10-03 PROCEDURE — 99285 EMERGENCY DEPT VISIT HI MDM: CPT | Mod: 25

## 2022-10-03 PROCEDURE — 63600175 PHARM REV CODE 636 W HCPCS: Performed by: EMERGENCY MEDICINE

## 2022-10-03 PROCEDURE — 96375 TX/PRO/DX INJ NEW DRUG ADDON: CPT

## 2022-10-03 PROCEDURE — 81025 URINE PREGNANCY TEST: CPT | Performed by: NURSE PRACTITIONER

## 2022-10-03 PROCEDURE — 96361 HYDRATE IV INFUSION ADD-ON: CPT

## 2022-10-03 PROCEDURE — 25000003 PHARM REV CODE 250: Performed by: EMERGENCY MEDICINE

## 2022-10-03 RX ORDER — METRONIDAZOLE 500 MG/100ML
500 INJECTION, SOLUTION INTRAVENOUS
Status: COMPLETED | OUTPATIENT
Start: 2022-10-03 | End: 2022-10-03

## 2022-10-03 RX ORDER — HYDROMORPHONE HYDROCHLORIDE 1 MG/ML
1 INJECTION, SOLUTION INTRAMUSCULAR; INTRAVENOUS; SUBCUTANEOUS
Status: COMPLETED | OUTPATIENT
Start: 2022-10-03 | End: 2022-10-03

## 2022-10-03 RX ORDER — LEVOFLOXACIN 750 MG/1
750 TABLET ORAL DAILY
Status: DISCONTINUED | OUTPATIENT
Start: 2022-10-04 | End: 2022-10-03

## 2022-10-03 RX ORDER — METRONIDAZOLE 500 MG/1
500 TABLET ORAL 3 TIMES DAILY
Qty: 21 TABLET | Refills: 0 | Status: SHIPPED | OUTPATIENT
Start: 2022-10-03 | End: 2022-10-13

## 2022-10-03 RX ORDER — OXYCODONE AND ACETAMINOPHEN 5; 325 MG/1; MG/1
1 TABLET ORAL EVERY 4 HOURS PRN
Qty: 12 TABLET | Refills: 0 | Status: SHIPPED | OUTPATIENT
Start: 2022-10-03 | End: 2023-11-02

## 2022-10-03 RX ORDER — CIPROFLOXACIN 500 MG/1
500 TABLET ORAL 2 TIMES DAILY
Qty: 20 TABLET | Refills: 0 | Status: SHIPPED | OUTPATIENT
Start: 2022-10-03 | End: 2022-10-13

## 2022-10-03 RX ORDER — LEVOFLOXACIN 750 MG/1
750 TABLET ORAL DAILY
Status: DISCONTINUED | OUTPATIENT
Start: 2022-10-03 | End: 2022-10-04 | Stop reason: HOSPADM

## 2022-10-03 RX ORDER — LEVOFLOXACIN 5 MG/ML
500 INJECTION, SOLUTION INTRAVENOUS
Status: DISCONTINUED | OUTPATIENT
Start: 2022-10-03 | End: 2022-10-03

## 2022-10-03 RX ADMIN — IOHEXOL 100 ML: 350 INJECTION, SOLUTION INTRAVENOUS at 07:10

## 2022-10-03 RX ADMIN — HYDROMORPHONE HYDROCHLORIDE 1 MG: 1 INJECTION, SOLUTION INTRAMUSCULAR; INTRAVENOUS; SUBCUTANEOUS at 07:10

## 2022-10-03 RX ADMIN — SODIUM CHLORIDE, SODIUM LACTATE, POTASSIUM CHLORIDE, AND CALCIUM CHLORIDE 1000 ML: .6; .31; .03; .02 INJECTION, SOLUTION INTRAVENOUS at 07:10

## 2022-10-03 RX ADMIN — METRONIDAZOLE 500 MG: 5 INJECTION, SOLUTION INTRAVENOUS at 09:10

## 2022-10-03 RX ADMIN — LEVOFLOXACIN 750 MG: 750 TABLET, FILM COATED ORAL at 09:10

## 2022-10-04 NOTE — ED PROVIDER NOTES
Encounter Date: 10/3/2022       History     Chief Complaint   Patient presents with    Abdominal Pain     Lower abd pain x 3 days. Pain with urination and during BM.       31-year-old female presenting as a department with left lower abdominal pain which started 3 days ago and now radiated diffusely into the lower abdomen.  Patient denies fever or chills.  Denies chest pain or shortness of breath.  Denies dysuria or hematuria or vaginal discharge or bleeding.    Review of patient's allergies indicates:  No Known Allergies  Past Medical History:   Diagnosis Date    Diverticulosis     No pertinent past medical history      Past Surgical History:   Procedure Laterality Date     SECTION      SKIN GRAFT      UMBILICAL HERNIA REPAIR N/A 2022    Procedure: REPAIR, HERNIA, UMBILICAL, AGE 5 YEARS OR OLDER;  Surgeon: Shahram Henderson III, MD;  Location: [facility] OR;  Service: General;  Laterality: N/A;     Family History   Problem Relation Age of Onset    Diabetes Mother     Hypertension Mother      Social History     Tobacco Use    Smoking status: Every Day     Packs/day: 0.25     Types: Cigarettes    Smokeless tobacco: Never   Substance Use Topics    Alcohol use: Yes     Comment: occ    Drug use: Never     Review of Systems   Constitutional: Negative.    HENT: Negative.     Eyes: Negative.    Respiratory: Negative.     Cardiovascular: Negative.  Negative for chest pain.   Gastrointestinal:  Positive for abdominal pain.   Endocrine: Negative.    Genitourinary: Negative.    Musculoskeletal: Negative.    Skin: Negative.    Allergic/Immunologic: Negative.    Neurological: Negative.    Hematological: Negative.    Psychiatric/Behavioral: Negative.     All other systems reviewed and are negative.    Physical Exam     Initial Vitals [10/03/22 1715]   BP Pulse Resp Temp SpO2   (!) 129/92 94 18 99 °F (37.2 °C) 100 %      MAP       --         Physical Exam    Nursing note and vitals reviewed.  Constitutional: She appears  well-developed and well-nourished.   HENT:   Head: Normocephalic and atraumatic.   Nose: Nose normal.   Mouth/Throat: Oropharynx is clear and moist.   Eyes: Conjunctivae and EOM are normal. Pupils are equal, round, and reactive to light.   Neck: Neck supple. No thyromegaly present. No tracheal deviation present. No JVD present.   Normal range of motion.  Cardiovascular:  Normal rate, regular rhythm, normal heart sounds and intact distal pulses.           No murmur heard.  Pulmonary/Chest: Breath sounds normal. No stridor. No respiratory distress. She has no wheezes. She has no rales.   Abdominal: Abdomen is soft. Bowel sounds are normal. There is abdominal tenderness.   Diffuse lower abdominal tenderness predominantly in the left lower quadrant of abdomen.   Musculoskeletal:         General: No edema. Normal range of motion.      Cervical back: Normal range of motion and neck supple.     Neurological: She is alert and oriented to person, place, and time. She has normal strength. No sensory deficit. GCS score is 15. GCS eye subscore is 4. GCS verbal subscore is 5. GCS motor subscore is 6.   Skin: Skin is warm and dry. Capillary refill takes less than 2 seconds.   Psychiatric: She has a normal mood and affect. Thought content normal.       ED Course   Procedures  Labs Reviewed   CBC W/ AUTO DIFFERENTIAL - Abnormal; Notable for the following components:       Result Value    RBC 3.87 (*)     MCV 99 (*)     MCH 32.3 (*)     All other components within normal limits   COMPREHENSIVE METABOLIC PANEL - Abnormal; Notable for the following components:    Sodium 135 (*)     Alkaline Phosphatase 53 (*)     Anion Gap 7 (*)     All other components within normal limits   URINALYSIS, REFLEX TO URINE CULTURE - Abnormal; Notable for the following components:    Leukocytes, UA 3+ (*)     All other components within normal limits    Narrative:     In and Out Cath as needed it patient unable to void  Specimen Source->Urine   URINALYSIS  MICROSCOPIC - Abnormal; Notable for the following components:    WBC, UA 8 (*)     Hyaline Casts, UA 3 (*)     All other components within normal limits    Narrative:     In and Out Cath as needed it patient unable to void  Specimen Source->Urine   VAGINAL SCREEN   C. TRACHOMATIS/N. GONORRHOEAE BY AMP DNA   CULTURE, GONOCOCCUS   POCT URINE PREGNANCY          Imaging Results              CT Abdomen Pelvis With Contrast (Final result)  Result time 10/03/22 20:17:25      Final result by Alcides Cancino MD (10/03/22 20:17:25)                   Narrative:      PROCEDURE: CT ABDOMEN PELVIS WITH IV CONTRAST, 10/3/2022 7:27 PM CDT    CLINICAL INDICATION:  Abdominal abscess/infection suspected.    COMPARISON: 5/5/2022    TECHNIQUE: Helical CT acquisition performed of the abdomen and pelvis with coronal and sagittal reformats. IV Contrast: Nonionic iodinated contrast was used.  PO Contrast: None.  Complications: None.    CT Dose reduction techniques were utilized for this examination with 1 or more of the following: Automated exposure control and/or adjustment of the mA or kV according to patient size, and/or use of iterative reconstruction technique.    FINDINGS:        Visualized chest base: No focal consolidations or pleural effusions.    Hepatobiliary system: No suspicious lesions.    Gallbladder: Partially collapsed gallbladder.    Spleen: Normal size.    Pancreas: No focal masses. No pancreatic ductal dilatation. No significant peripancreatic inflammatory changes.    Adrenal glands: Normal.    Kidneys and collecting system: No hydronephrosis.    Bowel and mesentery: No free air. No small bowel obstruction. Extensive sigmoid diverticulosis without diverticulitis. Moderate colonic wall thickening of sigmoid colon.    Appendix: Normal caliber. No significant inflammatory changes.    Pelvic organs: Distended urinary bladder. Uterus present.    Bones: No acute displaced fracture.    IMPRESSION:    Extensive sigmoid  diverticulosis without diverticulitis.    Moderate colonic wall thickening at sigmoid colon is nonspecific, may reflect colitis.      Electronically signed by:  Alcides Cancino MD  10/3/2022 8:17 PM CDT Workstation: [id number]                                     Medications   metronidazole IVPB 500 mg (has no administration in time range)   levoFLOXacin tablet 750 mg (has no administration in time range)   HYDROmorphone injection 1 mg (1 mg Intravenous Given 10/3/22 1924)   lactated ringers bolus 1,000 mL (1,000 mLs Intravenous New Bag 10/3/22 1927)   iohexoL (OMNIPAQUE 350) injection 100 mL (100 mLs Intravenous Given 10/3/22 1932)     Medical Decision Making:   Differential Diagnosis:     31-year-old female with left lower abdominal pain now radiating into the lower abdomen diffusely.  Patient does have abdominal tenderness.  Patient had previous history of diverticulitis.  CT scan shows evidence of colitis.  Patient treated with broad-spectrum antibiotic and pain control discharged with instructions on follow-up with primary care provider.  Return precautions given.  Clinical Tests:   Lab Tests: Reviewed  Radiological Study: Reviewed                        Clinical Impression:   Final diagnoses:  [R10.32] Left lower quadrant abdominal pain (Primary)  [K52.9] Colitis      ED Disposition Condition    Discharge Stable          ED Prescriptions       Medication Sig Dispense Start Date End Date Auth. Provider    ciprofloxacin HCl (CIPRO) 500 MG tablet Take 1 tablet (500 mg total) by mouth 2 (two) times daily. for 10 days 20 tablet 10/3/2022 10/13/2022 Octavia Monzon MD    metroNIDAZOLE (FLAGYL) 500 MG tablet Take 1 tablet (500 mg total) by mouth 3 (three) times daily. for 10 days 21 tablet 10/3/2022 10/13/2022 Octavia Monzon MD    oxyCODONE-acetaminophen (PERCOCET) 5-325 mg per tablet Take 1 tablet by mouth every 4 (four) hours as needed for Pain. 12 tablet 10/3/2022 -- Octavia Monzon MD          Follow-up Information       Follow  up With Specialties Details Why Contact Info    Start [facility] In 2 days  [address]  [place] [address]  [phone]               Octavia Monzon MD  10/03/22 2032

## 2023-04-04 ENCOUNTER — PATIENT MESSAGE (OUTPATIENT)
Dept: RESEARCH | Facility: HOSPITAL | Age: 33
End: 2023-04-04
Payer: MEDICAID

## 2023-08-06 ENCOUNTER — HOSPITAL ENCOUNTER (EMERGENCY)
Facility: HOSPITAL | Age: 33
End: 2023-08-06
Attending: STUDENT IN AN ORGANIZED HEALTH CARE EDUCATION/TRAINING PROGRAM
Payer: MEDICAID

## 2023-08-06 VITALS
OXYGEN SATURATION: 98 % | HEIGHT: 66 IN | SYSTOLIC BLOOD PRESSURE: 126 MMHG | DIASTOLIC BLOOD PRESSURE: 73 MMHG | TEMPERATURE: 98 F | RESPIRATION RATE: 16 BRPM | WEIGHT: 187 LBS | HEART RATE: 104 BPM | BODY MASS INDEX: 30.05 KG/M2

## 2023-08-06 DIAGNOSIS — M65.9 FLEXOR TENOSYNOVITIS OF FINGER: Primary | ICD-10-CM

## 2023-08-06 LAB
ALBUMIN SERPL BCP-MCNC: 4 G/DL (ref 3.5–5.2)
ALP SERPL-CCNC: 60 U/L (ref 55–135)
ALT SERPL W/O P-5'-P-CCNC: 25 U/L (ref 10–44)
ANION GAP SERPL CALC-SCNC: 9 MMOL/L (ref 8–16)
AST SERPL-CCNC: 19 U/L (ref 10–40)
BASOPHILS # BLD AUTO: ABNORMAL K/UL (ref 0–0.2)
BASOPHILS NFR BLD: 0 % (ref 0–1.9)
BILIRUB SERPL-MCNC: 0.4 MG/DL (ref 0.1–1)
BUN SERPL-MCNC: 12 MG/DL (ref 6–20)
CALCIUM SERPL-MCNC: 9.2 MG/DL (ref 8.7–10.5)
CHLORIDE SERPL-SCNC: 105 MMOL/L (ref 95–110)
CO2 SERPL-SCNC: 24 MMOL/L (ref 23–29)
CREAT SERPL-MCNC: 0.8 MG/DL (ref 0.5–1.4)
CRP SERPL-MCNC: 18.6 MG/L (ref 0–8.2)
DIFFERENTIAL METHOD: ABNORMAL
EOSINOPHIL # BLD AUTO: ABNORMAL K/UL (ref 0–0.5)
EOSINOPHIL NFR BLD: 2 % (ref 0–8)
ERYTHROCYTE [DISTWIDTH] IN BLOOD BY AUTOMATED COUNT: 11.8 % (ref 11.5–14.5)
ERYTHROCYTE [SEDIMENTATION RATE] IN BLOOD BY WESTERGREN METHOD: 14 MM/HR (ref 0–20)
EST. GFR  (NO RACE VARIABLE): >60 ML/MIN/1.73 M^2
GLUCOSE SERPL-MCNC: 95 MG/DL (ref 70–110)
HCT VFR BLD AUTO: 37.9 % (ref 37–48.5)
HGB BLD-MCNC: 12.8 G/DL (ref 12–16)
IMM GRANULOCYTES # BLD AUTO: ABNORMAL K/UL (ref 0–0.04)
IMM GRANULOCYTES NFR BLD AUTO: ABNORMAL % (ref 0–0.5)
LYMPHOCYTES # BLD AUTO: ABNORMAL K/UL (ref 1–4.8)
LYMPHOCYTES NFR BLD: 37 % (ref 18–48)
MCH RBC QN AUTO: 33.5 PG (ref 27–31)
MCHC RBC AUTO-ENTMCNC: 33.8 G/DL (ref 32–36)
MCV RBC AUTO: 99 FL (ref 82–98)
MONOCYTES # BLD AUTO: ABNORMAL K/UL (ref 0.3–1)
MONOCYTES NFR BLD: 3 % (ref 4–15)
NEUTROPHILS NFR BLD: 58 % (ref 38–73)
NRBC BLD-RTO: 0 /100 WBC
PLATELET # BLD AUTO: 266 K/UL (ref 150–450)
PLATELET BLD QL SMEAR: ABNORMAL
PMV BLD AUTO: 9 FL (ref 9.2–12.9)
POTASSIUM SERPL-SCNC: 4.1 MMOL/L (ref 3.5–5.1)
PROT SERPL-MCNC: 7.6 G/DL (ref 6–8.4)
RBC # BLD AUTO: 3.82 M/UL (ref 4–5.4)
SODIUM SERPL-SCNC: 138 MMOL/L (ref 136–145)
WBC # BLD AUTO: 8.51 K/UL (ref 3.9–12.7)

## 2023-08-06 PROCEDURE — 85007 BL SMEAR W/DIFF WBC COUNT: CPT | Performed by: STUDENT IN AN ORGANIZED HEALTH CARE EDUCATION/TRAINING PROGRAM

## 2023-08-06 PROCEDURE — 85651 RBC SED RATE NONAUTOMATED: CPT | Performed by: STUDENT IN AN ORGANIZED HEALTH CARE EDUCATION/TRAINING PROGRAM

## 2023-08-06 PROCEDURE — 99285 EMERGENCY DEPT VISIT HI MDM: CPT

## 2023-08-06 PROCEDURE — 80053 COMPREHEN METABOLIC PANEL: CPT | Performed by: STUDENT IN AN ORGANIZED HEALTH CARE EDUCATION/TRAINING PROGRAM

## 2023-08-06 PROCEDURE — 36415 COLL VENOUS BLD VENIPUNCTURE: CPT | Performed by: STUDENT IN AN ORGANIZED HEALTH CARE EDUCATION/TRAINING PROGRAM

## 2023-08-06 PROCEDURE — 96372 THER/PROPH/DIAG INJ SC/IM: CPT | Performed by: STUDENT IN AN ORGANIZED HEALTH CARE EDUCATION/TRAINING PROGRAM

## 2023-08-06 PROCEDURE — 63600175 PHARM REV CODE 636 W HCPCS: Performed by: STUDENT IN AN ORGANIZED HEALTH CARE EDUCATION/TRAINING PROGRAM

## 2023-08-06 PROCEDURE — 86140 C-REACTIVE PROTEIN: CPT | Performed by: STUDENT IN AN ORGANIZED HEALTH CARE EDUCATION/TRAINING PROGRAM

## 2023-08-06 PROCEDURE — 85027 COMPLETE CBC AUTOMATED: CPT | Performed by: STUDENT IN AN ORGANIZED HEALTH CARE EDUCATION/TRAINING PROGRAM

## 2023-08-06 RX ORDER — KETOROLAC TROMETHAMINE 30 MG/ML
15 INJECTION, SOLUTION INTRAMUSCULAR; INTRAVENOUS
Status: COMPLETED | OUTPATIENT
Start: 2023-08-06 | End: 2023-08-06

## 2023-08-06 RX ADMIN — KETOROLAC TROMETHAMINE 15 MG: 30 INJECTION, SOLUTION INTRAMUSCULAR; INTRAVENOUS at 09:08
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
## 2023-08-07 NOTE — ED NOTES
Vanessa Guerra now accepted in transfer to [facility] by Dr. SABRINA Ordonez; call report to:  [phone].  Transfer Center arranging transport.
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
## 2023-08-23 ENCOUNTER — OFFICE VISIT (OUTPATIENT)
Dept: ORTHOPEDICS | Facility: CLINIC | Age: 33
End: 2023-08-23
Payer: MEDICAID

## 2023-08-23 DIAGNOSIS — L03.113 CELLULITIS OF RIGHT HAND: Primary | ICD-10-CM

## 2023-08-23 DIAGNOSIS — M65.351 TRIGGER FINGER, RIGHT LITTLE FINGER: ICD-10-CM

## 2023-08-23 PROCEDURE — 99203 OFFICE O/P NEW LOW 30 MIN: CPT | Mod: S$PBB,,, | Performed by: ORTHOPAEDIC SURGERY

## 2023-08-23 PROCEDURE — 99212 OFFICE O/P EST SF 10 MIN: CPT | Mod: PBBFAC,PN | Performed by: ORTHOPAEDIC SURGERY

## 2023-08-23 PROCEDURE — 1159F PR MEDICATION LIST DOCUMENTED IN MEDICAL RECORD: ICD-10-PCS | Mod: CPTII,,, | Performed by: ORTHOPAEDIC SURGERY

## 2023-08-23 PROCEDURE — 99203 PR OFFICE/OUTPT VISIT, NEW, LEVL III, 30-44 MIN: ICD-10-PCS | Mod: S$PBB,,, | Performed by: ORTHOPAEDIC SURGERY

## 2023-08-23 PROCEDURE — 1159F MED LIST DOCD IN RCRD: CPT | Mod: CPTII,,, | Performed by: ORTHOPAEDIC SURGERY

## 2023-08-23 PROCEDURE — 99999 PR PBB SHADOW E&M-EST. PATIENT-LVL II: CPT | Mod: PBBFAC,,, | Performed by: ORTHOPAEDIC SURGERY

## 2023-08-23 PROCEDURE — 99999 PR PBB SHADOW E&M-EST. PATIENT-LVL II: ICD-10-PCS | Mod: PBBFAC,,, | Performed by: ORTHOPAEDIC SURGERY

## 2023-08-23 RX ORDER — METHYLPREDNISOLONE 4 MG/1
TABLET ORAL
Qty: 21 EACH | Refills: 0 | Status: SHIPPED | OUTPATIENT
Start: 2023-08-23 | End: 2023-09-13
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
## 2023-09-15 ENCOUNTER — HOSPITAL ENCOUNTER (EMERGENCY)
Facility: HOSPITAL | Age: 33
Discharge: HOME | End: 2023-09-15
Attending: EMERGENCY MEDICINE
Payer: MEDICAID

## 2023-09-15 VITALS
TEMPERATURE: 99 F | HEIGHT: 66 IN | SYSTOLIC BLOOD PRESSURE: 120 MMHG | HEART RATE: 104 BPM | OXYGEN SATURATION: 99 % | WEIGHT: 183 LBS | BODY MASS INDEX: 29.41 KG/M2 | RESPIRATION RATE: 16 BRPM | DIASTOLIC BLOOD PRESSURE: 77 MMHG

## 2023-09-15 DIAGNOSIS — R42 VERTIGO: ICD-10-CM

## 2023-09-15 DIAGNOSIS — U07.1 COVID-19: Primary | ICD-10-CM

## 2023-09-15 LAB — SARS-COV-2 RDRP RESP QL NAA+PROBE: POSITIVE

## 2023-09-15 PROCEDURE — U0002 COVID-19 LAB TEST NON-CDC: HCPCS | Performed by: EMERGENCY MEDICINE

## 2023-09-15 PROCEDURE — 99284 EMERGENCY DEPT VISIT MOD MDM: CPT | Mod: 25

## 2023-09-15 PROCEDURE — 25000003 PHARM REV CODE 250: Performed by: EMERGENCY MEDICINE

## 2023-09-15 RX ORDER — NIRMATRELVIR AND RITONAVIR 300-100 MG
KIT ORAL
Qty: 30 TABLET | Refills: 0 | Status: SHIPPED | OUTPATIENT
Start: 2023-09-15 | End: 2023-09-20

## 2023-09-15 RX ORDER — MECLIZINE HYDROCHLORIDE 25 MG/1
25 TABLET ORAL
Status: COMPLETED | OUTPATIENT
Start: 2023-09-15 | End: 2023-09-15

## 2023-09-15 RX ORDER — MECLIZINE HYDROCHLORIDE 25 MG/1
25 TABLET ORAL 3 TIMES DAILY PRN
Qty: 20 TABLET | Refills: 0 | Status: SHIPPED | OUTPATIENT
Start: 2023-09-15

## 2023-09-15 RX ADMIN — MECLIZINE HYDROCHLORIDE 25 MG: 25 TABLET ORAL at 09:09

## 2023-09-15 NOTE — Clinical Note
"Vanessa Guerra (Frances) was seen and treated in our emergency department on 9/15/2023.  She may return to work on 09/21/2023.       If you have any questions or concerns, please don't hesitate to call.      Jair SY    "

## 2023-09-15 NOTE — ED PROVIDER NOTES
Encounter Date: 9/15/2023       History     Chief Complaint   Patient presents with    Dizziness     Dizziness x 3 days.  Worse with moving.     Nasal Congestion     Clear mucous.     Cough     HPI patient is a 32-year-old woman who presents emergency department for evaluation of nasal congestion with facial pressure, cough and dizziness described as vertigo which has been happening intermittently for the past 3 days, worse with standing up or movement.  Patient went to outside facility told she had upper respiratory infection and given steroid injection.  She reports that she has been diffuse headache and blurred vision.  Review of patient's allergies indicates:  No Known Allergies  Past Medical History:   Diagnosis Date    Diverticulosis     No pertinent past medical history      Past Surgical History:   Procedure Laterality Date     SECTION      SKIN GRAFT      UMBILICAL HERNIA REPAIR N/A 2022    Procedure: REPAIR, HERNIA, UMBILICAL, AGE 3 YEARS OR OLDER;  Surgeon: Shahram Henderson III, MD;  Location: [facility];  Service: General;  Laterality: N/A;     Family History   Problem Relation Age of Onset    Diabetes Mother     Hypertension Mother      Social History     Tobacco Use    Smoking status: Every Day     Current packs/day: 0.25     Types: Cigarettes    Smokeless tobacco: Never   Substance Use Topics    Alcohol use: Yes     Comment: occ    Drug use: Never     Review of Systems   Constitutional:  Negative for fever.   HENT:  Positive for congestion. Negative for sore throat.    Eyes:  Positive for visual disturbance.   Respiratory:  Positive for cough. Negative for shortness of breath.    Cardiovascular:  Negative for chest pain.   Gastrointestinal:  Negative for nausea.   Genitourinary:  Negative for dysuria.   Musculoskeletal:  Negative for back pain.   Skin:  Negative for rash.   Neurological:  Positive for dizziness and headaches. Negative for weakness.   Hematological:  Does not bruise/bleed  easily.       Physical Exam     Initial Vitals [09/15/23 0814]   BP Pulse Resp Temp SpO2   138/85 108 16 98.6 °F (37 °C) 100 %      MAP       --         Physical Exam    Constitutional: Vital signs are normal. She appears well-developed and well-nourished.  Non-toxic appearance. No distress.   HENT:   Head: Normocephalic and atraumatic.   Frontal and maxillary sinus tenderness to percussion.   Eyes: EOM are normal. Pupils are equal, round, and reactive to light.   Neck: Neck supple. No JVD present.   Normal range of motion.  Cardiovascular:  Normal rate, regular rhythm, normal heart sounds and intact distal pulses.     Exam reveals no gallop and no friction rub.       No murmur heard.  Pulmonary/Chest: Breath sounds normal. She has no wheezes. She has no rhonchi. She has no rales.   Abdominal: Abdomen is soft. Bowel sounds are normal. There is no abdominal tenderness. There is no rebound and no guarding.   Musculoskeletal:         General: Normal range of motion.      Cervical back: Normal range of motion and neck supple. No rigidity.     Neurological: She is alert and oriented to person, place, and time. She has normal strength and normal reflexes. No cranial nerve deficit or sensory deficit. She exhibits normal muscle tone. Coordination normal. GCS eye subscore is 4. GCS verbal subscore is 5. GCS motor subscore is 6.   Negative Romberg   Skin: Skin is warm and dry.   Psychiatric: She has a normal mood and affect. Her speech is normal and behavior is normal. She is not actively hallucinating.         ED Course   Procedures  Labs Reviewed   SARS-COV-2 RNA AMPLIFICATION, QUAL - Abnormal; Notable for the following components:       Result Value    SARS-CoV-2 RNA, Amplification, Qual Positive (*)     All other components within normal limits    Narrative:     Covid   critical result(s) called and verbal readback obtained from   Gayatri Rhoades  by MING 09/15/2023 09:06          Imaging Results              CT Head  Without Contrast (Final result)  Result time 09/15/23 09:27:54      Final result by Saad Phelps MD (09/15/23 09:27:54)                   Impression:      1. Negative limited noncontrast head CT.      Electronically signed by: Saad Phelps  Date:    09/15/2023  Time:    09:27               Narrative:    EXAMINATION:  CT HEAD WITHOUT CONTRAST    CLINICAL HISTORY:  Dizziness, persistent/recurrent, cardiac or vascular cause suspected;    TECHNIQUE:  Low dose axial CT images obtained throughout the head without intravenous contrast. Sagittal and coronal reconstructions were performed.    COMPARISON:  None.    FINDINGS:  Significant regional artifact secondary to presumed hair clips.    Brain: There is no evidence of a mass, edema, midline shift, or intracranial hemorrhage. No extra-axial fluid collection. No CT evidence of an acute major vascular territorial infarct.    Ventricles: The ventricles, sulci, and cisterns are within normal limits.    Skull: The osseous structures are unremarkable in appearance.    Extracranial soft tissues: Limited imaging is within normal limits.    Other: The visualized portions of the sinuses, orbits, and mastoid air cells are unremarkable.                                       Medications   meclizine tablet 25 mg (25 mg Oral Given 9/15/23 0934)     Medical Decision Making  32-year-old woman who presents emergency department for evaluation of nasal congestion with facial pressure, cough and dizziness described as vertigo which has been happening intermittently for the past 3 days, worse with standing up or movement.  Patient went to outside facility told she had upper respiratory infection and given steroid injection.  She reports that she has been diffuse headache and blurred vision..  Her neurological exam is normal.  CT head performed showing no acute abnormalities.  Her COVID-19 test came back positive.  This is likely causing her symptoms, her headache and her vertigo.  She will be  prescribed Paxlovid and meclizine.  I have low suspicion for an acute CVA.  Return precautions discussed.  Discharged in no acute distress.  PCP follow-up.    Amount and/or Complexity of Data Reviewed  Radiology: ordered.    Risk  Prescription drug management.                               Clinical Impression:   Final diagnoses:  [U07.1] COVID-19 (Primary)  [R42] Vertigo        ED Disposition Condition    Discharge Stable          ED Prescriptions       Medication Sig Dispense Start Date End Date Auth. Provider    meclizine (ANTIVERT) 25 mg tablet Take 1 tablet (25 mg total) by mouth 3 (three) times daily as needed. 20 tablet 9/15/2023 -- Jaswant Correa MD    nirmatrelvir-ritonavir (PAXLOVID) 300 mg (150 mg x 2)-100 mg copackaged tablets (EUA) Take 3 tablets by mouth 2 (two) times daily. Each dose contains 2 nirmatrelvir (pink tablets) and 1 ritonavir (white tablet). Take all 3 tablets together 30 tablet 9/15/2023 9/20/2023 Jaswant Correa MD          Follow-up Information       Follow up With Specialties Details Why Contact Info Additional Information    [facility]  As needed, If symptoms worsen [address] [place] [address] [address] [place] [address] 1st floor    [address]  cheryl edwards  [phone]              Jaswant Correa MD  09/15/23 [id number]

## 2023-11-02 ENCOUNTER — HOSPITAL ENCOUNTER (EMERGENCY)
Facility: HOSPITAL | Age: 33
Discharge: HOME | End: 2023-11-02
Attending: EMERGENCY MEDICINE
Payer: MEDICAID

## 2023-11-02 VITALS
WEIGHT: 186 LBS | RESPIRATION RATE: 18 BRPM | TEMPERATURE: 98 F | DIASTOLIC BLOOD PRESSURE: 75 MMHG | SYSTOLIC BLOOD PRESSURE: 108 MMHG | BODY MASS INDEX: 29.89 KG/M2 | HEART RATE: 72 BPM | HEIGHT: 66 IN | OXYGEN SATURATION: 100 %

## 2023-11-02 DIAGNOSIS — R10.84 GENERALIZED ABDOMINAL PAIN: ICD-10-CM

## 2023-11-02 DIAGNOSIS — N73.0 PID (ACUTE PELVIC INFLAMMATORY DISEASE): ICD-10-CM

## 2023-11-02 DIAGNOSIS — N83.209 HEMORRHAGIC OVARIAN CYST: Primary | ICD-10-CM

## 2023-11-02 LAB
ALBUMIN SERPL BCP-MCNC: 4 G/DL (ref 3.5–5.2)
ALP SERPL-CCNC: 45 U/L (ref 55–135)
ALT SERPL W/O P-5'-P-CCNC: 11 U/L (ref 10–44)
ANION GAP SERPL CALC-SCNC: 4 MMOL/L (ref 8–16)
AST SERPL-CCNC: 14 U/L (ref 10–40)
B-HCG UR QL: NEGATIVE
BASOPHILS # BLD AUTO: 0.03 K/UL (ref 0–0.2)
BASOPHILS NFR BLD: 0.5 % (ref 0–1.9)
BILIRUB SERPL-MCNC: 0.4 MG/DL (ref 0.1–1)
BILIRUB UR QL STRIP: NEGATIVE
BUN SERPL-MCNC: 10 MG/DL (ref 6–20)
CALCIUM SERPL-MCNC: 8.9 MG/DL (ref 8.7–10.5)
CHLORIDE SERPL-SCNC: 108 MMOL/L (ref 95–110)
CLARITY UR: CLEAR
CO2 SERPL-SCNC: 25 MMOL/L (ref 23–29)
COLOR UR: YELLOW
CREAT SERPL-MCNC: 0.9 MG/DL (ref 0.5–1.4)
CTP QC/QA: YES
DIFFERENTIAL METHOD: ABNORMAL
EOSINOPHIL # BLD AUTO: 0.2 K/UL (ref 0–0.5)
EOSINOPHIL NFR BLD: 2.9 % (ref 0–8)
ERYTHROCYTE [DISTWIDTH] IN BLOOD BY AUTOMATED COUNT: 11.9 % (ref 11.5–14.5)
EST. GFR  (NO RACE VARIABLE): >60 ML/MIN/1.73 M^2
GLUCOSE SERPL-MCNC: 84 MG/DL (ref 70–110)
GLUCOSE UR QL STRIP: NEGATIVE
HCT VFR BLD AUTO: 38.3 % (ref 37–48.5)
HGB BLD-MCNC: 12.9 G/DL (ref 12–16)
HGB UR QL STRIP: NEGATIVE
IMM GRANULOCYTES # BLD AUTO: 0.01 K/UL (ref 0–0.04)
IMM GRANULOCYTES NFR BLD AUTO: 0.2 % (ref 0–0.5)
KETONES UR QL STRIP: NEGATIVE
LEUKOCYTE ESTERASE UR QL STRIP: NEGATIVE
LIPASE SERPL-CCNC: 9 U/L (ref 4–60)
LYMPHOCYTES # BLD AUTO: 2.6 K/UL (ref 1–4.8)
LYMPHOCYTES NFR BLD: 40.7 % (ref 18–48)
MCH RBC QN AUTO: 33.7 PG (ref 27–31)
MCHC RBC AUTO-ENTMCNC: 33.7 G/DL (ref 32–36)
MCV RBC AUTO: 100 FL (ref 82–98)
MONOCYTES # BLD AUTO: 0.3 K/UL (ref 0.3–1)
MONOCYTES NFR BLD: 5.3 % (ref 4–15)
NEUTROPHILS # BLD AUTO: 3.3 K/UL (ref 1.8–7.7)
NEUTROPHILS NFR BLD: 50.4 % (ref 38–73)
NITRITE UR QL STRIP: NEGATIVE
NRBC BLD-RTO: 0 /100 WBC
PH UR STRIP: 6 [PH] (ref 5–8)
PLATELET # BLD AUTO: 266 K/UL (ref 150–450)
PMV BLD AUTO: 9.6 FL (ref 9.2–12.9)
POTASSIUM SERPL-SCNC: 3.7 MMOL/L (ref 3.5–5.1)
PROT SERPL-MCNC: 6.7 G/DL (ref 6–8.4)
PROT UR QL STRIP: NEGATIVE
RBC # BLD AUTO: 3.83 M/UL (ref 4–5.4)
SODIUM SERPL-SCNC: 137 MMOL/L (ref 136–145)
SP GR UR STRIP: 1.01 (ref 1–1.03)
SPECIMEN SOURCE: NORMAL
T VAGINALIS GENITAL QL WET PREP: NORMAL
URN SPEC COLLECT METH UR: NORMAL
UROBILINOGEN UR STRIP-ACNC: NEGATIVE EU/DL
WBC # BLD AUTO: 6.46 K/UL (ref 3.9–12.7)
YEAST GENITAL QL WET PREP: NORMAL

## 2023-11-02 PROCEDURE — 85025 COMPLETE CBC W/AUTO DIFF WBC: CPT

## 2023-11-02 PROCEDURE — 25000003 PHARM REV CODE 250

## 2023-11-02 PROCEDURE — 96365 THER/PROPH/DIAG IV INF INIT: CPT

## 2023-11-02 PROCEDURE — 99285 EMERGENCY DEPT VISIT HI MDM: CPT | Mod: 25

## 2023-11-02 PROCEDURE — 87081 CULTURE SCREEN ONLY: CPT

## 2023-11-02 PROCEDURE — 81025 URINE PREGNANCY TEST: CPT

## 2023-11-02 PROCEDURE — 83690 ASSAY OF LIPASE: CPT

## 2023-11-02 PROCEDURE — 96375 TX/PRO/DX INJ NEW DRUG ADDON: CPT

## 2023-11-02 PROCEDURE — 81003 URINALYSIS AUTO W/O SCOPE: CPT

## 2023-11-02 PROCEDURE — 87205 SMEAR GRAM STAIN: CPT

## 2023-11-02 PROCEDURE — 63600175 PHARM REV CODE 636 W HCPCS

## 2023-11-02 PROCEDURE — 25500020 PHARM REV CODE 255

## 2023-11-02 PROCEDURE — 87210 SMEAR WET MOUNT SALINE/INK: CPT

## 2023-11-02 PROCEDURE — 87491 CHLMYD TRACH DNA AMP PROBE: CPT

## 2023-11-02 PROCEDURE — 80053 COMPREHEN METABOLIC PANEL: CPT

## 2023-11-02 PROCEDURE — 87591 N.GONORRHOEAE DNA AMP PROB: CPT

## 2023-11-02 RX ORDER — DOXYCYCLINE 100 MG/1
100 CAPSULE ORAL 2 TIMES DAILY
Qty: 28 CAPSULE | Refills: 0 | Status: SHIPPED | OUTPATIENT
Start: 2023-11-02 | End: 2023-11-16

## 2023-11-02 RX ORDER — DOXYCYCLINE 100 MG/1
100 CAPSULE ORAL 2 TIMES DAILY
Qty: 28 CAPSULE | Refills: 0 | Status: SHIPPED | OUTPATIENT
Start: 2023-11-02 | End: 2023-11-02 | Stop reason: SDUPTHER

## 2023-11-02 RX ORDER — HYDROCODONE BITARTRATE AND ACETAMINOPHEN 5; 325 MG/1; MG/1
1 TABLET ORAL
Status: COMPLETED | OUTPATIENT
Start: 2023-11-02 | End: 2023-11-02

## 2023-11-02 RX ORDER — IBUPROFEN 400 MG/1
800 TABLET ORAL
Status: COMPLETED | OUTPATIENT
Start: 2023-11-02 | End: 2023-11-02

## 2023-11-02 RX ORDER — METRONIDAZOLE 500 MG/1
500 TABLET ORAL 2 TIMES DAILY
Qty: 28 TABLET | Refills: 0 | Status: SHIPPED | OUTPATIENT
Start: 2023-11-02 | End: 2023-11-16

## 2023-11-02 RX ORDER — METRONIDAZOLE 500 MG/1
500 TABLET ORAL 2 TIMES DAILY
Qty: 28 TABLET | Refills: 0 | Status: SHIPPED | OUTPATIENT
Start: 2023-11-02 | End: 2023-11-02 | Stop reason: SDUPTHER

## 2023-11-02 RX ORDER — ONDANSETRON 2 MG/ML
4 INJECTION INTRAMUSCULAR; INTRAVENOUS
Status: COMPLETED | OUTPATIENT
Start: 2023-11-02 | End: 2023-11-02

## 2023-11-02 RX ORDER — KETOROLAC TROMETHAMINE 30 MG/ML
15 INJECTION, SOLUTION INTRAMUSCULAR; INTRAVENOUS
Status: COMPLETED | OUTPATIENT
Start: 2023-11-02 | End: 2023-11-02

## 2023-11-02 RX ORDER — HYDROCODONE BITARTRATE AND ACETAMINOPHEN 5; 325 MG/1; MG/1
1 TABLET ORAL EVERY 6 HOURS PRN
Qty: 12 TABLET | Refills: 0 | Status: SHIPPED | OUTPATIENT
Start: 2023-11-02 | End: 2023-11-05

## 2023-11-02 RX ADMIN — CEFTRIAXONE SODIUM 1 G: 1 INJECTION, POWDER, FOR SOLUTION INTRAMUSCULAR; INTRAVENOUS at 05:11

## 2023-11-02 RX ADMIN — ONDANSETRON 4 MG: 2 INJECTION INTRAMUSCULAR; INTRAVENOUS at 10:11

## 2023-11-02 RX ADMIN — IBUPROFEN 800 MG: 400 TABLET, FILM COATED ORAL at 05:11

## 2023-11-02 RX ADMIN — HYDROCODONE BITARTRATE AND ACETAMINOPHEN 1 TABLET: 5; 325 TABLET ORAL at 03:11

## 2023-11-02 RX ADMIN — KETOROLAC TROMETHAMINE 15 MG: 30 INJECTION, SOLUTION INTRAMUSCULAR; INTRAVENOUS at 10:11

## 2023-11-02 RX ADMIN — IOHEXOL 100 ML: 350 INJECTION, SOLUTION INTRAVENOUS at 12:11

## 2023-11-02 NOTE — DISCHARGE INSTRUCTIONS
Please follow up with OBGYN as he was possible further evaluation and recheck for your ovarian cyst.  Please take antibiotics as prescribed until gone.    Please return to the ED for worsening abdominal pain, worsening pelvic pain, fever, vomiting, diarrhea, or any new or worsening concerns.    Do not drive, swim, climb to height, take a bath, operate heavy machinery, drink alcohol, or take potentially sedating medications, sign any legal documents, or make any important decisions for 24 hours if you have received any pain medications, sedatives, or mood altering drugs during her ER visit or within 24 hours of taking them if they have been prescribed to you.

## 2023-11-02 NOTE — Clinical Note
"Vanessa Guerra (Frances) was seen and treated in our emergency department on 11/2/2023.  She may return to work on 11/07/2023.       If you have any questions or concerns, please don't hesitate to call.      Sanaz Garza NP"

## 2023-11-02 NOTE — ED PROVIDER NOTES
Encounter Date: 2023       History     Chief Complaint   Patient presents with    Abdominal Pain     Hernia surgery 2 months ago, started having pain Thursday after work     Patient is a 32 y.o. female with past medical history of umbilical hernia repair in 2022 who presents to ED via self for concern for abdominal pain which began 1 week(s) ago.  Patient reports since Thursday of last week she was having generalized abdominal pain.  Patient reports her abdomen hurts when he was getting up her sitting and she was urinating or having a bowel movement.  Patient denies dysuria.  Patient denies diarrhea or vomiting or nausea.  Patient reports decreased appetite.  Patient denies fever.  Patient denies blood in his stool.  Patient reports she has not had a bowel movement last 2 days.  Patient denies any cough, chest pain, or shortness of breath.  Patient reports she was has some yellowish vaginal discharge since yesterday.  Patient denies vaginal bleeding.  Patient denies vaginal pain.  Patient was awake and alert in no acute distress.         Review of patient's allergies indicates:  No Known Allergies  Past Medical History:   Diagnosis Date    Diverticulosis     No pertinent past medical history      Past Surgical History:   Procedure Laterality Date     SECTION      SKIN GRAFT      UMBILICAL HERNIA REPAIR N/A 2022    Procedure: REPAIR, HERNIA, UMBILICAL, AGE 5 YEARS OR OLDER;  Surgeon: Shahram Henderson III, MD;  Location: [facility];  Service: General;  Laterality: N/A;     Family History   Problem Relation Age of Onset    Diabetes Mother     Hypertension Mother      Social History     Tobacco Use    Smoking status: Every Day     Current packs/day: 0.25     Types: Cigarettes    Smokeless tobacco: Never   Substance Use Topics    Alcohol use: Yes     Comment: occ    Drug use: Never     Review of Systems   Constitutional:  Positive for appetite change. Negative for fever.   HENT: Negative.  Negative  for congestion, drooling, sore throat and trouble swallowing.    Respiratory: Negative.  Negative for cough and shortness of breath.    Cardiovascular:  Negative for chest pain.   Gastrointestinal:  Positive for abdominal pain and constipation. Negative for blood in stool, diarrhea, nausea and vomiting.   Genitourinary:  Positive for vaginal discharge. Negative for dysuria, hematuria, vaginal bleeding and vaginal pain.   Musculoskeletal:  Negative for back pain.   Skin:  Negative for color change, pallor and rash.   Neurological: Negative.  Negative for weakness.   Hematological:  Does not bruise/bleed easily.   Psychiatric/Behavioral: Negative.         Physical Exam     Initial Vitals [11/02/23 0832]   BP Pulse Resp Temp SpO2   (!) 162/90 89 16 98.2 °F (36.8 °C) 99 %      MAP       --         Physical Exam    Nursing note and vitals reviewed.  Constitutional: She appears well-developed and well-nourished. She is not diaphoretic. No distress.   HENT:   Head: Normocephalic and atraumatic.   Right Ear: External ear normal.   Left Ear: External ear normal.   Nose: Nose normal.   Eyes: EOM are normal.   Neck:   Normal range of motion.  Cardiovascular:  Normal rate, regular rhythm, normal heart sounds and intact distal pulses.     Exam reveals no gallop and no friction rub.       No murmur heard.  Pulmonary/Chest: Breath sounds normal. No respiratory distress. She has no wheezes. She has no rhonchi. She has no rales. She exhibits no tenderness.   Abdominal: Abdomen is soft and protuberant. Bowel sounds are normal. She exhibits no distension and no mass. A surgical scar is present. There is generalized abdominal tenderness.     There is no rebound and no guarding.   Genitourinary:    Pelvic exam was performed with patient supine.   There is no rash, tenderness or lesion on the right labia. There is no rash, tenderness or lesion on the left labia. Right adnexum displays tenderness. Left adnexum displays tenderness.     Vaginal discharge present.      No vaginal erythema, tenderness or bleeding.   No erythema, tenderness or bleeding in the vagina.    No foreign body in the vagina.      No signs of injury in the vagina.      Genitourinary Comments:  exam performed with ED tech at bedside for the entire exam.  Patient tolerated well.     Musculoskeletal:         General: Normal range of motion.      Cervical back: Normal range of motion.     Neurological: She is alert and oriented to person, place, and time. She has normal strength. GCS score is 15. GCS eye subscore is 4. GCS verbal subscore is 5. GCS motor subscore is 6.   Skin: Skin is warm and dry. Capillary refill takes less than 2 seconds.   Psychiatric: She has a normal mood and affect. Her behavior is normal. Judgment and thought content normal.         ED Course   Procedures  Labs Reviewed   CBC W/ AUTO DIFFERENTIAL - Abnormal; Notable for the following components:       Result Value    RBC 3.83 (*)      (*)     MCH 33.7 (*)     All other components within normal limits   COMPREHENSIVE METABOLIC PANEL - Abnormal; Notable for the following components:    Alkaline Phosphatase 45 (*)     Anion Gap 4 (*)     All other components within normal limits   VAGINAL SCREEN   CULTURE, GONOCOCCUS   C. TRACHOMATIS/N. GONORRHOEAE BY AMP DNA   LIPASE   URINALYSIS, REFLEX TO URINE CULTURE    Narrative:     Specimen Source->Urine   POCT URINE PREGNANCY          Imaging Results              US Pelvis Comp with Transvag NON-OB (xpd) (Final result)  Result time 11/02/23 15:26:52   Procedure changed from US Pelvis Complete Non OB     Final result by Hu Cleveland Jr., MD (11/02/23 15:26:52)                   Narrative:    EXAMINATION: TRANSABDOMINAL AND TRANSVAGINAL PELVIC ULTRASOUND    CLINICAL HISTORY: Abdominal pain    COMPARISON: CT scan of the abdomen and pelvis with contrast 11/2/2023.    TECHNIQUE: Real-time sonographic evaluation of the pelvic organs was completed utilizing  both gray scale and color flow imaging.    FINDINGS:    Anteverted retroverted uterus with smooth myometrial outline measuring 7.4 x 4.8 x 4.2 cm with giving a volume of 77.8 mL. Endometrial echo complex is of normal thickness measuring 9 mm the widest dimension without evidence of intrauterine gestation or endometrial fluid.    Ovoid 6.6 x 4.1 x 4.0 cm complex cystic mass with evidence of a lacelike appearance and linear strands within the internal matrix with well-defined borders takes on the appearance of evolving hemorrhagic cyst. On the basis size, short-term follow-up is advised.    Right ovary is normal in size at 4.1 x 2.1 x 1.9 cm with measured volume of 8.6 mL.    No evidence of free fluid in the pelvis.    IMPRESSION:    6.6 x 4.1 x 4.0 cm complex mass with features of a a benign hemorrhagic cyst formation in evolution. Recommend continued follow-up on the basis of the size of the referenced lesion.    Electronically signed by:  Hu Cleveland MD  11/02/2023 03:26 PM CDT Workstation: [id number]                                     CT Abdomen Pelvis With IV Contrast (Final result)  Result time 11/02/23 13:30:25      Final result by Chase Melton MD (11/02/23 13:30:25)                   Narrative:    CMS MANDATED QUALITY DATA-CT RADIATION DOSE-436  All CT scans at this facility use dose modulation, iterative reconstruction, and or weight-based dosing when appropriate to reduce radiation dose to as low as reasonably achievable. Unless otherwise stated, incidental findings do not require dedicated follow-up imaging.    HISTORY: Abdominal pain, acute, nonlocalized    FINDINGS: Axial postcontrast imaging was performed with 100 mL Omnipaque 350 IV contrast. Comparison to multiple prior exams.    CT ABDOMEN: The lung bases are clear. The liver and gallbladder enhance normally, with no calcified gallstones or biliary ductal dilatation. The spleen is normal in size and enhances normally, with 10 mm rounded  enhancing splenule in the left upper quadrant.    The pancreas, adrenal glands and kidneys enhance normally, with no renal calculi or hydronephrosis. The abdominal aorta, iliac arteries, and remaining abdominal vasculature enhance normally, with no enlarged mesenteric or retroperitoneal lymph nodes.    There is no bowel wall thickening, inflammation, or bowel obstruction. There are scattered colon diverticula, without evidence of acute diverticulitis. The appendix is normal, with no ascites or intraperitoneal free air.    CT PELVIS: There are numerous sigmoid colon diverticula, without evidence of acute diverticulitis. The rectum and collapsed urinary bladder enhance normally. The uterus enhances normally, with  a 50 mm oval left adnexal hypodensity suggesting complex cyst. No pelvic free fluid.    There are several calcified pelvic phleboliths, with no enlarged iliac or inguinal chain lymph nodes. The extraperitoneal soft tissues enhance normally. There are no acute fractures or destructive osseous lesions.    IMPRESSION:  1. A 50 mm left adnexal mass suggesting complex ovarian cyst.  2. Diverticulosis coli without evidence of acute diverticulitis.    Electronically signed by:  Chase Melton MD  11/02/2023 01:30 PM CDT Workstation: [id number]                                     Medications   cefTRIAXone (ROCEPHIN) 1 g in dextrose 5 % 100 mL IVPB (ready to mix) (1 g Intravenous New Bag 11/2/23 6369)   ketorolac injection 15 mg (15 mg Intravenous Given 11/2/23 1022)   ondansetron injection 4 mg (4 mg Intravenous Given 11/2/23 1022)   iohexoL (OMNIPAQUE 350) injection 100 mL (100 mLs Intravenous Given 11/2/23 1218)   HYDROcodone-acetaminophen 5-325 mg per tablet 1 tablet (1 tablet Oral Given 11/2/23 1506)   ibuprofen tablet 800 mg (800 mg Oral Given 11/2/23 1759)     Medical Decision Making  MDM    Patient presents for emergent evaluation of acute abdominal pain that poses a possible threat to life and/or bodily  function.    Differential diagnosis included but not limited to incarcerated hernia, ovarian cyst, ovarian rupture, PID, tubo-ovarian abscess, urinary tract infection.  In the ED patient found to have acute generalized abdominal pain with worsening abdominal pain in the lower quadrants.  Patient has normal bowel sounds in all 4 quadrants.  Patient is awake and alert in interactive in no acute distress.  Patient has clear lung sounds bilaterally with no increased work of breathing.  On patient's sagittal gave him she was noted to have a moderate amount of white vaginal discharge.  Patient has pain to palpation in bilateral adnexal areas on bimanual exam.    Discharge MDM  I discussed the patient presentation labs, CT findings with my attending Dr. Vasquez.    Patient was managed in the ED with IV Rocephin, Toradol, oral Norco and oral ibuprofen.    The response to treatment was decrease pain.    There are no signs of ovarian torsion on ultrasound or signs of a tubo-ovarian abscess on CT.  Patient's lab work unremarkable.  Patient was discharged in stable condition with close follow up with OBGYN.  Discussed with the patient that she will be placed on antibiotics to treat for PID.  Patient noted to have few clue cells on her vaginal screening patient's discharge medications will cover her for bacterial vaginosis. Detailed return precautions discussed to return to the ED for worsening abdominal pain, persistent vomiting diarrhea, fever, chest pain, difficulty breathing, or any new or worsening concerns.  Patient states understanding.    Amount and/or Complexity of Data Reviewed  Labs: ordered.     Details: CBC significant for WBC 6.46, RBC 3.83, CMP significant for alkaline phosphatase 45, anion gap 4, lipase 9, UA within normal limits, few clue cells on vaginal screen, no Trichomonas or budding yeast.  Pending gonorrhea and chlamydia swabs  Radiology: ordered.     Details: CT abdomen significant for IMPRESSION:  1. A  50 mm left adnexal mass suggesting complex ovarian cyst.  2. Diverticulosis coli without evidence of acute diverticulitis.    Ultrasound pelvis significant for IMPRESSION: 6.6 x 4.1 x 4.0 cm complex mass with features of a a benign hemorrhagic cyst formation in evolution. Recommend continued follow-up on the basis of the size of the referenced lesion.    Risk  OTC drugs.  Prescription drug management.                               Clinical Impression:   Final diagnoses:  [R10.84] Generalized abdominal pain  [N83.209] Hemorrhagic ovarian cyst (Primary)  [N73.0] PID (acute pelvic inflammatory disease)        ED Disposition Condition    Discharge Stable          ED Prescriptions       Medication Sig Dispense Start Date End Date Auth. Provider    doxycycline (VIBRAMYCIN) 100 MG Cap Take 1 capsule (100 mg total) by mouth 2 (two) times daily. for 14 days 28 capsule 11/2/2023 11/16/2023 Sanaz Garza NP    metroNIDAZOLE (FLAGYL) 500 MG tablet Take 1 tablet (500 mg total) by mouth 2 (two) times a day. for 14 days 28 tablet 11/2/2023 11/16/2023 Sanaz Garza NP          Follow-up Information       Follow up With Specialties Details Why Contact Info Additional Information    Toño Valle MD Obstetrics and Gynecology Schedule an appointment as soon as possible for a visit  For recheck/continuing care [id number]  [facility] [address]  [phone]       [facility] - Emergency Dept Emergency Medicine  If symptoms worsen [address] [facility] [id number] [phone] 1st floor             Sanaz Garza NP  11/02/23 9764

## 2023-11-05 LAB
BACTERIA GENITAL AEROBE CULT: NORMAL
CHLAMYDIA, AMPLIFIED DNA: NEGATIVE
GRAM STN SPEC: NORMAL
N GONORRHOEAE, AMPLIFIED DNA: NEGATIVE

## 2024-01-23 DIAGNOSIS — N83.201 BILATERAL OVARIAN CYSTS: Primary | ICD-10-CM

## 2024-01-23 DIAGNOSIS — N83.202 BILATERAL OVARIAN CYSTS: Primary | ICD-10-CM

## 2024-01-26 ENCOUNTER — HOSPITAL ENCOUNTER (OUTPATIENT)
Dept: RADIOLOGY | Facility: HOSPITAL | Age: 34
Discharge: HOME | End: 2024-01-26
Attending: SPECIALIST
Payer: MEDICAID

## 2024-01-26 DIAGNOSIS — N83.201 BILATERAL OVARIAN CYSTS: ICD-10-CM

## 2024-01-26 DIAGNOSIS — N83.202 BILATERAL OVARIAN CYSTS: ICD-10-CM

## 2024-01-26 PROCEDURE — 76830 TRANSVAGINAL US NON-OB: CPT | Mod: TC

## 2024-01-26 PROCEDURE — 76856 US EXAM PELVIC COMPLETE: CPT | Mod: 26,,, | Performed by: RADIOLOGY

## 2024-01-26 PROCEDURE — 76830 TRANSVAGINAL US NON-OB: CPT | Mod: 26,,, | Performed by: RADIOLOGY

## 2024-04-29 ENCOUNTER — HOSPITAL ENCOUNTER (EMERGENCY)
Facility: HOSPITAL | Age: 34
Discharge: HOME | End: 2024-04-29
Attending: EMERGENCY MEDICINE
Payer: MEDICAID

## 2024-04-29 VITALS
OXYGEN SATURATION: 100 % | DIASTOLIC BLOOD PRESSURE: 81 MMHG | TEMPERATURE: 98 F | HEIGHT: 66 IN | SYSTOLIC BLOOD PRESSURE: 125 MMHG | RESPIRATION RATE: 18 BRPM | BODY MASS INDEX: 15.11 KG/M2 | HEART RATE: 85 BPM | WEIGHT: 94 LBS

## 2024-04-29 DIAGNOSIS — H10.31 ACUTE CONJUNCTIVITIS OF RIGHT EYE, UNSPECIFIED ACUTE CONJUNCTIVITIS TYPE: Primary | ICD-10-CM

## 2024-04-29 PROCEDURE — 25000003 PHARM REV CODE 250: Performed by: EMERGENCY MEDICINE

## 2024-04-29 PROCEDURE — 99283 EMERGENCY DEPT VISIT LOW MDM: CPT

## 2024-04-29 RX ORDER — ALBUTEROL SULFATE 90 UG/1
2 AEROSOL, METERED RESPIRATORY (INHALATION) EVERY 4 HOURS PRN
COMMUNITY
Start: 2024-04-01

## 2024-04-29 RX ORDER — OFLOXACIN 3 MG/ML
1 SOLUTION/ DROPS OPHTHALMIC 4 TIMES DAILY
Qty: 5 ML | Refills: 0 | Status: SHIPPED | OUTPATIENT
Start: 2024-04-29

## 2024-04-29 RX ADMIN — FLUORESCEIN SODIUM 1 EACH: 1 STRIP OPHTHALMIC at 06:04

## 2024-04-29 NOTE — ED PROVIDER NOTES
Encounter Date: 2024       History     Chief Complaint   Patient presents with    Eye Pain     To right eye with white drainage since yesterday     HPI patient is a 33-year-old woman who presents emergency department for several days of gradually worsening right eye redness and pain.  This morning she woke up and her eye was crusted shut.  She went to work at [employer] and her managers who suggested she come to the ED.  Review of patient's allergies indicates:  No Known Allergies  Past Medical History:   Diagnosis Date    Diverticulosis     No pertinent past medical history      Past Surgical History:   Procedure Laterality Date     SECTION      SKIN GRAFT      UMBILICAL HERNIA REPAIR N/A 2022    Procedure: REPAIR, HERNIA, UMBILICAL, AGE 3 YEARS OR OLDER;  Surgeon: Shahram Henderson III, MD;  Location: [facility];  Service: General;  Laterality: N/A;     Family History   Problem Relation Name Age of Onset    Diabetes Mother      Hypertension Mother       Social History     Tobacco Use    Smoking status: Every Day     Current packs/day: 0.25     Types: Cigarettes    Smokeless tobacco: Never   Substance Use Topics    Alcohol use: Yes     Comment: occ    Drug use: Never     Review of Systems   Constitutional:  Negative for fever.   HENT:  Negative for sore throat.    Eyes:  Positive for pain, discharge and redness.   Respiratory:  Negative for shortness of breath.    Cardiovascular:  Negative for chest pain.   Gastrointestinal:  Negative for nausea.   Genitourinary:  Negative for dysuria.   Musculoskeletal:  Negative for back pain.   Skin:  Negative for rash.   Neurological:  Negative for weakness.   Hematological:  Does not bruise/bleed easily.       Physical Exam     Initial Vitals [24 0549]   BP Pulse Resp Temp SpO2   128/69 (!) 111 18 98.2 °F (36.8 °C) 100 %      MAP       --         Physical Exam    Nursing note and vitals reviewed.  Constitutional: She appears well-developed and  well-nourished. No distress.   HENT:   Head: Normocephalic and atraumatic.   Eyes: EOM are normal. Pupils are equal, round, and reactive to light. No scleral icterus.   Right Eye  Right Visual Status: Uncorrected  Right Visual Test: 20/30  Left Eye  Left Visual Status: Uncorrected  Left Visual Test: 20/25  Both Eyes  Both Visual Status: Uncorrected  Both Visual Test : 20/30    Tonometry pressure in the right eye is 17 mm Hg.  Right eyes injected without any notable discharge.  There is no fluorescein uptake.  Anterior compartment is not bulging.  Chambers appear quiet.   Neck: Neck supple.   Pulmonary/Chest: No respiratory distress.   Musculoskeletal:         General: Normal range of motion.      Cervical back: Neck supple.     Neurological: She is alert and oriented to person, place, and time.   Skin: Skin is warm and dry.         ED Course   Procedures  Labs Reviewed - No data to display       Imaging Results    None          Medications   fluorescein ophthalmic strip 1 each (1 each Both Eyes Given 4/29/24 0600)     Medical Decision Making  Patient is a 33-year-old woman who presents emergency department for right eye redness, pain and discharge.  She is afebrile well-appearing nontoxic.  Differential diagnosis includes corneal abrasion, bacterial conjunctivitis, viral conjunctivitis, allergic conjunctivitis, glaucoma.  No evidence of corneal abrasion or increasing chronic his pressures.  Patient is exam and history is most consistent with conjunctivitis.  Given that her eye was crusted shut I will cover with antibiotic therapy.  She is advised to use it for 3 days after she is well.  She is to follow-up with ophthalmology in 2-3 days if not improving or return for worsening symptoms.  Patient is discharged in no acute distress    Risk  Prescription drug management.                                      Clinical Impression:  Final diagnoses:  [H10.31] Acute conjunctivitis of right eye, unspecified acute  conjunctivitis type (Primary)          ED Disposition Condition    Discharge Stable          ED Prescriptions       Medication Sig Dispense Start Date End Date Auth. Provider    ofloxacin (OCUFLOX) 0.3 % ophthalmic solution Place 1 drop into the right eye 4 (four) times daily. 5 mL 4/29/2024 -- Jaswant Correa MD          Follow-up Information       Follow up With Specialties Details Why Contact Info Additional Information    [place] [facility] - ED Emergency Medicine  As needed, If symptoms worsen [address] [place] [address] [address] [place] [id number] 1st floor    Mega Orosco, OD Optometry In 3 days As needed if not improving or sooner if worsening [address] [place] [address] [address]  [place] [address]  [phone]                Jaswant Correa MD  04/29/24 0644

## 2024-04-29 NOTE — Clinical Note
"Vanessa Dyson" Charlie was seen and treated in our emergency department on 4/29/2024.  She may return to work on 05/02/2024.       If you have any questions or concerns, please don't hesitate to call.      Jaswant Correa MD"

## 2024-06-10 ENCOUNTER — HOSPITAL ENCOUNTER (EMERGENCY)
Facility: HOSPITAL | Age: 34
Discharge: HOME | End: 2024-06-10
Attending: STUDENT IN AN ORGANIZED HEALTH CARE EDUCATION/TRAINING PROGRAM
Payer: MEDICAID

## 2024-06-10 VITALS
DIASTOLIC BLOOD PRESSURE: 88 MMHG | HEART RATE: 88 BPM | RESPIRATION RATE: 20 BRPM | SYSTOLIC BLOOD PRESSURE: 131 MMHG | BODY MASS INDEX: 30.18 KG/M2 | OXYGEN SATURATION: 99 % | TEMPERATURE: 98 F | WEIGHT: 187 LBS

## 2024-06-10 DIAGNOSIS — N93.8 DUB (DYSFUNCTIONAL UTERINE BLEEDING): Primary | ICD-10-CM

## 2024-06-10 LAB
ANION GAP SERPL CALC-SCNC: 9 MMOL/L (ref 8–16)
B-HCG UR QL: NEGATIVE
BACTERIA #/AREA URNS HPF: ABNORMAL /HPF
BASOPHILS # BLD AUTO: 0.03 K/UL (ref 0–0.2)
BASOPHILS NFR BLD: 0.5 % (ref 0–1.9)
BILIRUB UR QL STRIP: NEGATIVE
BUN SERPL-MCNC: 12 MG/DL (ref 6–20)
CALCIUM SERPL-MCNC: 8.6 MG/DL (ref 8.7–10.5)
CHLORIDE SERPL-SCNC: 111 MMOL/L (ref 95–110)
CLARITY UR: ABNORMAL
CO2 SERPL-SCNC: 20 MMOL/L (ref 23–29)
COLOR UR: YELLOW
CREAT SERPL-MCNC: 0.7 MG/DL (ref 0.5–1.4)
CTP QC/QA: YES
DIFFERENTIAL METHOD BLD: ABNORMAL
EOSINOPHIL # BLD AUTO: 0.2 K/UL (ref 0–0.5)
EOSINOPHIL NFR BLD: 3 % (ref 0–8)
ERYTHROCYTE [DISTWIDTH] IN BLOOD BY AUTOMATED COUNT: 11.9 % (ref 11.5–14.5)
EST. GFR  (NO RACE VARIABLE): >60 ML/MIN/1.73 M^2
GLUCOSE SERPL-MCNC: 108 MG/DL (ref 70–110)
GLUCOSE UR QL STRIP: NEGATIVE
HCT VFR BLD AUTO: 39.5 % (ref 37–48.5)
HGB BLD-MCNC: 13.2 G/DL (ref 12–16)
HGB UR QL STRIP: ABNORMAL
IMM GRANULOCYTES # BLD AUTO: 0.01 K/UL (ref 0–0.04)
IMM GRANULOCYTES NFR BLD AUTO: 0.2 % (ref 0–0.5)
KETONES UR QL STRIP: NEGATIVE
LEUKOCYTE ESTERASE UR QL STRIP: NEGATIVE
LYMPHOCYTES # BLD AUTO: 2.8 K/UL (ref 1–4.8)
LYMPHOCYTES NFR BLD: 43.2 % (ref 18–48)
MCH RBC QN AUTO: 33.5 PG (ref 27–31)
MCHC RBC AUTO-ENTMCNC: 33.4 G/DL (ref 32–36)
MCV RBC AUTO: 100 FL (ref 82–98)
MICROSCOPIC COMMENT: ABNORMAL
MONOCYTES # BLD AUTO: 0.3 K/UL (ref 0.3–1)
MONOCYTES NFR BLD: 5 % (ref 4–15)
NEUTROPHILS # BLD AUTO: 3.1 K/UL (ref 1.8–7.7)
NEUTROPHILS NFR BLD: 48.1 % (ref 38–73)
NITRITE UR QL STRIP: NEGATIVE
NRBC BLD-RTO: 0 /100 WBC
PH UR STRIP: 7 [PH] (ref 5–8)
PLATELET # BLD AUTO: 295 K/UL (ref 150–450)
PMV BLD AUTO: 9.3 FL (ref 9.2–12.9)
POTASSIUM SERPL-SCNC: 3.5 MMOL/L (ref 3.5–5.1)
PROT UR QL STRIP: NEGATIVE
RBC # BLD AUTO: 3.94 M/UL (ref 4–5.4)
RBC #/AREA URNS HPF: 4 /HPF (ref 0–4)
SODIUM SERPL-SCNC: 140 MMOL/L (ref 136–145)
SP GR UR STRIP: 1.02 (ref 1–1.03)
SQUAMOUS #/AREA URNS HPF: 14 /HPF
URN SPEC COLLECT METH UR: ABNORMAL
UROBILINOGEN UR STRIP-ACNC: NEGATIVE EU/DL
WBC # BLD AUTO: 6.41 K/UL (ref 3.9–12.7)
WBC #/AREA URNS HPF: 11 /HPF (ref 0–5)

## 2024-06-10 PROCEDURE — 87086 URINE CULTURE/COLONY COUNT: CPT | Performed by: NURSE PRACTITIONER

## 2024-06-10 PROCEDURE — 99284 EMERGENCY DEPT VISIT MOD MDM: CPT | Mod: 25

## 2024-06-10 PROCEDURE — 81025 URINE PREGNANCY TEST: CPT | Performed by: NURSE PRACTITIONER

## 2024-06-10 PROCEDURE — 85025 COMPLETE CBC W/AUTO DIFF WBC: CPT | Performed by: NURSE PRACTITIONER

## 2024-06-10 PROCEDURE — 81001 URINALYSIS AUTO W/SCOPE: CPT | Performed by: NURSE PRACTITIONER

## 2024-06-10 PROCEDURE — 80048 BASIC METABOLIC PNL TOTAL CA: CPT | Performed by: NURSE PRACTITIONER

## 2024-06-10 RX ORDER — NAPROXEN 375 MG/1
375 TABLET ORAL 2 TIMES DAILY WITH MEALS
Qty: 14 TABLET | Refills: 0 | Status: SHIPPED | OUTPATIENT
Start: 2024-06-10 | End: 2024-06-17

## 2024-06-10 NOTE — ED PROVIDER NOTES
Encounter Date: 6/10/2024       History     Chief Complaint   Patient presents with    Vaginal Bleeding     Started yesterday, today describes as heavy with clots, history of ovarian cysts     33-year old female presents emergency department with a past medical history of diverticulosis, presents emergency department reports that her last menstrual cycle was in February she states that she started Depo shot in February after her period started she states that she has had no menstrual cycles until last p.m. when her menstrual cycle started.  She states that she had some cramping and reports that when she woke up this morning she had a lot of blood and some clots which is what prompted her visit to the emergency department.  She is only used 1 pad throughout the day and she reports that she has no longer bleeding.    The history is provided by the patient.     Review of patient's allergies indicates:  No Known Allergies  Past Medical History:   Diagnosis Date    Diverticulosis     No pertinent past medical history      Past Surgical History:   Procedure Laterality Date     SECTION      SKIN GRAFT      UMBILICAL HERNIA REPAIR N/A 2022    Procedure: REPAIR, HERNIA, UMBILICAL, AGE 5 YEARS OR OLDER;  Surgeon: Shahram Henderson III, MD;  Location: [facility];  Service: General;  Laterality: N/A;     Family History   Problem Relation Name Age of Onset    Diabetes Mother      Hypertension Mother       Social History     Tobacco Use    Smoking status: Every Day     Current packs/day: 0.25     Types: Cigarettes    Smokeless tobacco: Never   Substance Use Topics    Alcohol use: Yes     Comment: occ    Drug use: Never     Review of Systems   Constitutional: Negative.    HENT: Negative.     Respiratory: Negative.     Cardiovascular: Negative.    Gastrointestinal: Negative.    Genitourinary:  Positive for menstrual problem, pelvic pain and vaginal bleeding.   Musculoskeletal: Negative.    Neurological: Negative.     Psychiatric/Behavioral: Negative.     All other systems reviewed and are negative.      Physical Exam     Initial Vitals [06/10/24 1124]   BP Pulse Resp Temp SpO2   131/88 88 20 98.2 °F (36.8 °C) 99 %      MAP       --         Physical Exam    Nursing note and vitals reviewed.  Constitutional: She appears well-developed and well-nourished.   HENT:   Head: Normocephalic and atraumatic.   Eyes: Conjunctivae and EOM are normal. Pupils are equal, round, and reactive to light.   Neck:   Normal range of motion.  Cardiovascular:  Normal rate, regular rhythm, normal heart sounds and intact distal pulses.           Pulmonary/Chest: Breath sounds normal. No respiratory distress. She exhibits no tenderness.   Abdominal: Abdomen is soft. Bowel sounds are normal. She exhibits no distension and no mass. There is no abdominal tenderness.   Genitourinary:    Genitourinary Comments: Patient was offered a pelvic exam for  thoroughness of evaluation of vaginal bleeding however declined.      Musculoskeletal:      Cervical back: Normal range of motion.     Neurological: She is alert and oriented to person, place, and time. She has normal strength.   Skin: Skin is warm.         ED Course   Procedures  Labs Reviewed   CBC W/ AUTO DIFFERENTIAL - Abnormal; Notable for the following components:       Result Value    RBC 3.94 (*)      (*)     MCH 33.5 (*)     All other components within normal limits   BASIC METABOLIC PANEL - Abnormal; Notable for the following components:    Chloride 111 (*)     CO2 20 (*)     Calcium 8.6 (*)     All other components within normal limits   URINALYSIS, REFLEX TO URINE CULTURE - Abnormal; Notable for the following components:    Appearance, UA Hazy (*)     Occult Blood UA 2+ (*)     All other components within normal limits    Narrative:     Specimen Source->Urine   URINALYSIS MICROSCOPIC - Abnormal; Notable for the following components:    WBC, UA 11 (*)     All other components within normal limits     Narrative:     Specimen Source->Urine   CULTURE, URINE   POCT URINE PREGNANCY          Imaging Results              US Pelvis Complete Non OB (Final result)  Result time 06/10/24 14:49:29      Final result by Chase Melton MD (06/10/24 14:49:29)                   Impression:      No acute findings.      Electronically signed by: Chase Melton  Date:    06/10/2024  Time:    14:49               Narrative:    EXAMINATION:  US PELVIS COMPLETE NON OB    CLINICAL HISTORY:  Pelvic pain, vaginal bleeding; negative urine pregnancy test.    COMPARISON:  Multiple prior exams.    FINDINGS:  Transabdominal pelvic ultrasound was performed.  The uterus is retroflexed, measuring 8 cm in length, with normal echotexture.  The echogenic endometrial complex measures 4 mm maximum thickness, with trace anechoic endometrial fluid and a few echogenic calcifications.  No endometrial mass demonstrated.    Both ovaries are normal in size and echotexture, and have normal color and spectral Doppler vascular flow.  The left ovary measures 2.9 x 2.7 x 1.5 cm, with the right ovary measuring 2.7 x 2.4 x 1.9 cm.    There are no adnexal masses or pelvic free fluid demonstrated.  The urinary bladder is normal.                                       Medications - No data to display  Medical Decision Making  33-year old female presents emergency department with a past medical history of diverticulosis, presents emergency department reports that her last menstrual cycle was in February she states that she started Depo shot in February after her period started she states that she has had no menstrual cycles until last p.m. when her menstrual cycle started.  She states that she had some cramping and reports that when she woke up this morning she had a lot of blood and some clots which is what prompted her visit to the emergency department.  She is only used 1 pad throughout the day and she reports that she has no longer bleeding.    The history is  provided by the patient.     Considerations include but not limited to, electrolyte abnormalities, ovarian cyst, ovarian torsion, dysfunctional uterine bleeding    33-year-old female presents emergency department past medical history includes diverticulosis currently on Depo her 1st shot was in February, last dose of Depo was in April, due for her next dose July 2nd reports had some heavy bleeding that started last p.m. she has currently not bleeding any further.  Reports she just has some lower abdominal cramping.  Patient's labs unremarkable, H and H and platelets are stable.  No evidence of UTI.  Ultrasound performed no evidence of ovarian torsion or ovarian cyst.  Patient was offered pelvic examination while in the emergency department for completeness of exam however declined.  She is only used 1 pad  today for bleeding and reports that she has currently not bleeding any further.  Patient will be discharged home with Naprosyn instructed follow up with Dr. Valle who is her OBGYN given return precautions    Amount and/or Complexity of Data Reviewed  Labs: ordered. Decision-making details documented in ED Course.  Radiology: ordered. Decision-making details documented in ED Course.                                      Clinical Impression:  Final diagnoses:  [N93.8] DUB (dysfunctional uterine bleeding) (Primary)          ED Disposition Condition    Discharge Stable          ED Prescriptions       Medication Sig Dispense Start Date End Date Auth. Provider    naproxen (NAPROSYN) 375 MG tablet Take 1 tablet (375 mg total) by mouth 2 (two) times daily with meals. for 14 doses 14 tablet 6/10/2024 6/17/2024 Tri Senior FNP          Follow-up Information       Follow up With Specialties Details Why Contact Info    Toño Valle MD Obstetrics and Gynecology Schedule an appointment as soon as possible for a visit in 2 days  [address] [facility]  [place] [address] [facility] [address]  [phone]                Tri Senior, [facility]  06/10/24 6637

## 2024-06-10 NOTE — DISCHARGE INSTRUCTIONS
Naprosyn as directed if needed for pain  Follow-up as directed   Return if condition becomes worse for any concerns

## 2024-06-10 NOTE — FIRST PROVIDER EVALUATION
Emergency Department TeleTriage Encounter Note      CHIEF COMPLAINT    Chief Complaint   Patient presents with    Vaginal Bleeding     Started yesterday, today describes as heavy with clots, history of ovarian cysts       VITAL SIGNS   Initial Vitals [06/10/24 1124]   BP Pulse Resp Temp SpO2   131/88 88 20 98.2 °F (36.8 °C) 99 %      MAP       --            ALLERGIES    Review of patient's allergies indicates:  No Known Allergies    PROVIDER TRIAGE NOTE  This is a teletriage evaluation of a 33 y.o. female presenting to the ED complaining of vaginal bleeding and pelvic pain starting yesterday. Pmhx of ovarian cysts. Pt states she is not pregnant.     Alert, no distress.     Initial orders will be placed and care will be transferred to an alternate provider when patient is roomed for a full evaluation. Any additional orders and the final disposition will be determined by that provider.         ORDERS  Labs Reviewed - No data to display    ED Orders (720h ago, onward)      Start Ordered     Status Ordering Provider    Unscheduled 06/10/24 1130  CBC auto differential  STAT         Ordered FRANKLIN LIMA N.    Unscheduled 06/10/24 1130  Basic metabolic panel (BMP)  STAT         Ordered CHANNING LIMAIKA N.    Unscheduled 06/10/24 1130  Insert Saline lock IV  Once         Ordered CHANNING LIMAIKA N.    Unscheduled 06/10/24 1130  POCT urine pregnancy  Once         Ordered CHANNING LIMAIKA N.    Unscheduled 06/10/24 1130  Urinalysis, Reflex to Urine Culture Urine, Clean Catch  STAT         Ordered FRANKLIN LIMA N.    Unscheduled 06/10/24 1130  Setup Patient for Pelvic Exam  Once         Ordered FRANKLIN LIMA N.              Virtual Visit Note: The provider triage portion of this emergency department evaluation and documentation was performed via [employer], a HIPAA-compliant telemedicine application, in concert with a tele-presenter in the room. A face to face patient evaluation  with one of my colleagues will occur once the patient is placed in an emergency department room.      DISCLAIMER: This note was prepared with MSI voice recognition transcription software. Garbled syntax, mangled pronouns, and other bizarre constructions may be attributed to that software system.

## 2024-06-10 NOTE — Clinical Note
"Vanessa Guerra (Frances) was seen and treated in our emergency department on 6/10/2024.  She may return to work on 06/12/2024.       If you have any questions or concerns, please don't hesitate to call.      Tri Senior, MONO"

## 2024-06-13 LAB — BACTERIA UR CULT: NO GROWTH

## 2024-08-02 ENCOUNTER — HOSPITAL ENCOUNTER (EMERGENCY)
Facility: HOSPITAL | Age: 34
Discharge: HOME | End: 2024-08-02
Attending: EMERGENCY MEDICINE
Payer: MEDICAID

## 2024-08-02 VITALS
HEART RATE: 78 BPM | DIASTOLIC BLOOD PRESSURE: 84 MMHG | SYSTOLIC BLOOD PRESSURE: 130 MMHG | OXYGEN SATURATION: 100 % | WEIGHT: 182 LBS | TEMPERATURE: 99 F | HEIGHT: 66 IN | BODY MASS INDEX: 29.25 KG/M2 | RESPIRATION RATE: 18 BRPM

## 2024-08-02 DIAGNOSIS — K08.89 PAIN, DENTAL: ICD-10-CM

## 2024-08-02 DIAGNOSIS — K04.7 DENTAL ABSCESS: Primary | ICD-10-CM

## 2024-08-02 LAB
B-HCG UR QL: NEGATIVE
CTP QC/QA: YES

## 2024-08-02 PROCEDURE — 81025 URINE PREGNANCY TEST: CPT | Performed by: NURSE PRACTITIONER

## 2024-08-02 PROCEDURE — 25000003 PHARM REV CODE 250: Performed by: NURSE PRACTITIONER

## 2024-08-02 PROCEDURE — 99284 EMERGENCY DEPT VISIT MOD MDM: CPT

## 2024-08-02 RX ORDER — DICLOFENAC SODIUM 50 MG/1
50 TABLET, DELAYED RELEASE ORAL 3 TIMES DAILY PRN
Qty: 20 TABLET | Refills: 2 | Status: SHIPPED | OUTPATIENT
Start: 2024-08-02

## 2024-08-02 RX ORDER — CLINDAMYCIN HYDROCHLORIDE 300 MG/1
300 CAPSULE ORAL EVERY 6 HOURS
Qty: 40 CAPSULE | Refills: 0 | Status: SHIPPED | OUTPATIENT
Start: 2024-08-02 | End: 2024-08-12

## 2024-08-02 RX ORDER — NAPROXEN 250 MG/1
500 TABLET ORAL
Status: COMPLETED | OUTPATIENT
Start: 2024-08-02 | End: 2024-08-02

## 2024-08-02 RX ADMIN — NAPROXEN 500 MG: 250 TABLET ORAL at 11:08

## 2024-12-14 ENCOUNTER — HOSPITAL ENCOUNTER (OUTPATIENT)
Facility: HOSPITAL | Age: 34
Discharge: HOME | End: 2024-12-16
Attending: STUDENT IN AN ORGANIZED HEALTH CARE EDUCATION/TRAINING PROGRAM | Admitting: HOSPITALIST
Payer: MEDICAID

## 2024-12-14 DIAGNOSIS — R07.9 CHEST PAIN: ICD-10-CM

## 2024-12-14 DIAGNOSIS — K57.92 DIVERTICULITIS: Primary | ICD-10-CM

## 2024-12-14 PROCEDURE — 99285 EMERGENCY DEPT VISIT HI MDM: CPT

## 2024-12-14 NOTE — Clinical Note
placidosincerejose manuel leo accompanied their spouse to the emergency department on 12/14/2024. They may return to work on 12/16/2024.      If you have any questions or concerns, please don't hesitate to call.       YOSSI

## 2024-12-15 LAB
ALBUMIN SERPL BCP-MCNC: 3.5 G/DL (ref 3.5–5.2)
ALBUMIN SERPL BCP-MCNC: 3.7 G/DL (ref 3.5–5.2)
ALP SERPL-CCNC: 50 U/L (ref 40–150)
ALP SERPL-CCNC: 51 U/L (ref 40–150)
ALT SERPL W/O P-5'-P-CCNC: 16 U/L (ref 10–44)
ALT SERPL W/O P-5'-P-CCNC: 18 U/L (ref 10–44)
ANION GAP SERPL CALC-SCNC: 7 MMOL/L (ref 8–16)
ANION GAP SERPL CALC-SCNC: 9 MMOL/L (ref 8–16)
AST SERPL-CCNC: 15 U/L (ref 10–40)
AST SERPL-CCNC: 16 U/L (ref 10–40)
B-HCG UR QL: NEGATIVE
BASOPHILS # BLD AUTO: 0.05 K/UL (ref 0–0.2)
BASOPHILS # BLD AUTO: 0.06 K/UL (ref 0–0.2)
BASOPHILS NFR BLD: 0.4 % (ref 0–1.9)
BASOPHILS NFR BLD: 0.6 % (ref 0–1.9)
BILIRUB SERPL-MCNC: 0.2 MG/DL (ref 0.1–1)
BILIRUB SERPL-MCNC: 0.6 MG/DL (ref 0.1–1)
BILIRUB UR QL STRIP: NEGATIVE
BUN SERPL-MCNC: 12 MG/DL (ref 6–20)
BUN SERPL-MCNC: 14 MG/DL (ref 6–20)
CALCIUM SERPL-MCNC: 8.8 MG/DL (ref 8.7–10.5)
CALCIUM SERPL-MCNC: 9.1 MG/DL (ref 8.7–10.5)
CHLORIDE SERPL-SCNC: 110 MMOL/L (ref 95–110)
CHLORIDE SERPL-SCNC: 112 MMOL/L (ref 95–110)
CLARITY UR: CLEAR
CO2 SERPL-SCNC: 20 MMOL/L (ref 23–29)
CO2 SERPL-SCNC: 20 MMOL/L (ref 23–29)
COLOR UR: YELLOW
CREAT SERPL-MCNC: 0.7 MG/DL (ref 0.5–1.4)
CREAT SERPL-MCNC: 0.8 MG/DL (ref 0.5–1.4)
CTP QC/QA: YES
DIFFERENTIAL METHOD BLD: ABNORMAL
DIFFERENTIAL METHOD BLD: ABNORMAL
EOSINOPHIL # BLD AUTO: 0.2 K/UL (ref 0–0.5)
EOSINOPHIL # BLD AUTO: 0.3 K/UL (ref 0–0.5)
EOSINOPHIL NFR BLD: 1.9 % (ref 0–8)
EOSINOPHIL NFR BLD: 2.3 % (ref 0–8)
ERYTHROCYTE [DISTWIDTH] IN BLOOD BY AUTOMATED COUNT: 11.7 % (ref 11.5–14.5)
ERYTHROCYTE [DISTWIDTH] IN BLOOD BY AUTOMATED COUNT: 11.8 % (ref 11.5–14.5)
EST. GFR  (NO RACE VARIABLE): >60 ML/MIN/1.73 M^2
EST. GFR  (NO RACE VARIABLE): >60 ML/MIN/1.73 M^2
GLUCOSE SERPL-MCNC: 116 MG/DL (ref 70–110)
GLUCOSE SERPL-MCNC: 97 MG/DL (ref 70–110)
GLUCOSE UR QL STRIP: NEGATIVE
HCT VFR BLD AUTO: 37.4 % (ref 37–48.5)
HCT VFR BLD AUTO: 38.6 % (ref 37–48.5)
HGB BLD-MCNC: 12.5 G/DL (ref 12–16)
HGB BLD-MCNC: 13 G/DL (ref 12–16)
HGB UR QL STRIP: NEGATIVE
IMM GRANULOCYTES # BLD AUTO: 0.03 K/UL (ref 0–0.04)
IMM GRANULOCYTES # BLD AUTO: 0.05 K/UL (ref 0–0.04)
IMM GRANULOCYTES NFR BLD AUTO: 0.3 % (ref 0–0.5)
IMM GRANULOCYTES NFR BLD AUTO: 0.4 % (ref 0–0.5)
KETONES UR QL STRIP: NEGATIVE
LEUKOCYTE ESTERASE UR QL STRIP: NEGATIVE
LYMPHOCYTES # BLD AUTO: 3.2 K/UL (ref 1–4.8)
LYMPHOCYTES # BLD AUTO: 4.3 K/UL (ref 1–4.8)
LYMPHOCYTES NFR BLD: 31.2 % (ref 18–48)
LYMPHOCYTES NFR BLD: 32.3 % (ref 18–48)
MAGNESIUM SERPL-MCNC: 1.9 MG/DL (ref 1.6–2.6)
MCH RBC QN AUTO: 33.5 PG (ref 27–31)
MCH RBC QN AUTO: 33.6 PG (ref 27–31)
MCHC RBC AUTO-ENTMCNC: 33.4 G/DL (ref 32–36)
MCHC RBC AUTO-ENTMCNC: 33.7 G/DL (ref 32–36)
MCV RBC AUTO: 100 FL (ref 82–98)
MCV RBC AUTO: 101 FL (ref 82–98)
MONOCYTES # BLD AUTO: 0.5 K/UL (ref 0.3–1)
MONOCYTES # BLD AUTO: 0.6 K/UL (ref 0.3–1)
MONOCYTES NFR BLD: 3.7 % (ref 4–15)
MONOCYTES NFR BLD: 5.6 % (ref 4–15)
NEUTROPHILS # BLD AUTO: 6.2 K/UL (ref 1.8–7.7)
NEUTROPHILS # BLD AUTO: 8.1 K/UL (ref 1.8–7.7)
NEUTROPHILS NFR BLD: 60 % (ref 38–73)
NEUTROPHILS NFR BLD: 61.3 % (ref 38–73)
NITRITE UR QL STRIP: NEGATIVE
NRBC BLD-RTO: 0 /100 WBC
NRBC BLD-RTO: 0 /100 WBC
PH UR STRIP: 7 [PH] (ref 5–8)
PHOSPHATE SERPL-MCNC: 4.2 MG/DL (ref 2.7–4.5)
PLATELET # BLD AUTO: 266 K/UL (ref 150–450)
PLATELET # BLD AUTO: 313 K/UL (ref 150–450)
PMV BLD AUTO: 8.7 FL (ref 9.2–12.9)
PMV BLD AUTO: 8.9 FL (ref 9.2–12.9)
POTASSIUM SERPL-SCNC: 3.8 MMOL/L (ref 3.5–5.1)
POTASSIUM SERPL-SCNC: 4.2 MMOL/L (ref 3.5–5.1)
PROT SERPL-MCNC: 6.6 G/DL (ref 6–8.4)
PROT SERPL-MCNC: 7.1 G/DL (ref 6–8.4)
PROT UR QL STRIP: NEGATIVE
RBC # BLD AUTO: 3.72 M/UL (ref 4–5.4)
RBC # BLD AUTO: 3.88 M/UL (ref 4–5.4)
SODIUM SERPL-SCNC: 139 MMOL/L (ref 136–145)
SODIUM SERPL-SCNC: 139 MMOL/L (ref 136–145)
SP GR UR STRIP: 1.03 (ref 1–1.03)
URN SPEC COLLECT METH UR: NORMAL
UROBILINOGEN UR STRIP-ACNC: NEGATIVE EU/DL
WBC # BLD AUTO: 10.39 K/UL (ref 3.9–12.7)
WBC # BLD AUTO: 13.17 K/UL (ref 3.9–12.7)

## 2024-12-15 PROCEDURE — 96376 TX/PRO/DX INJ SAME DRUG ADON: CPT

## 2024-12-15 PROCEDURE — 63600175 PHARM REV CODE 636 W HCPCS: Performed by: NURSE PRACTITIONER

## 2024-12-15 PROCEDURE — 84100 ASSAY OF PHOSPHORUS: CPT | Performed by: NURSE PRACTITIONER

## 2024-12-15 PROCEDURE — 96372 THER/PROPH/DIAG INJ SC/IM: CPT | Performed by: NURSE PRACTITIONER

## 2024-12-15 PROCEDURE — G0378 HOSPITAL OBSERVATION PER HR: HCPCS

## 2024-12-15 PROCEDURE — 96365 THER/PROPH/DIAG IV INF INIT: CPT

## 2024-12-15 PROCEDURE — 85025 COMPLETE CBC W/AUTO DIFF WBC: CPT | Performed by: STUDENT IN AN ORGANIZED HEALTH CARE EDUCATION/TRAINING PROGRAM

## 2024-12-15 PROCEDURE — 36415 COLL VENOUS BLD VENIPUNCTURE: CPT | Performed by: STUDENT IN AN ORGANIZED HEALTH CARE EDUCATION/TRAINING PROGRAM

## 2024-12-15 PROCEDURE — 81003 URINALYSIS AUTO W/O SCOPE: CPT | Performed by: STUDENT IN AN ORGANIZED HEALTH CARE EDUCATION/TRAINING PROGRAM

## 2024-12-15 PROCEDURE — 63600175 PHARM REV CODE 636 W HCPCS: Performed by: HOSPITALIST

## 2024-12-15 PROCEDURE — 96366 THER/PROPH/DIAG IV INF ADDON: CPT

## 2024-12-15 PROCEDURE — 25000003 PHARM REV CODE 250: Performed by: STUDENT IN AN ORGANIZED HEALTH CARE EDUCATION/TRAINING PROGRAM

## 2024-12-15 PROCEDURE — 80053 COMPREHEN METABOLIC PANEL: CPT | Mod: 91 | Performed by: NURSE PRACTITIONER

## 2024-12-15 PROCEDURE — 36415 COLL VENOUS BLD VENIPUNCTURE: CPT | Performed by: NURSE PRACTITIONER

## 2024-12-15 PROCEDURE — 81025 URINE PREGNANCY TEST: CPT | Performed by: STUDENT IN AN ORGANIZED HEALTH CARE EDUCATION/TRAINING PROGRAM

## 2024-12-15 PROCEDURE — 63600175 PHARM REV CODE 636 W HCPCS: Performed by: STUDENT IN AN ORGANIZED HEALTH CARE EDUCATION/TRAINING PROGRAM

## 2024-12-15 PROCEDURE — 96367 TX/PROPH/DG ADDL SEQ IV INF: CPT

## 2024-12-15 PROCEDURE — 83735 ASSAY OF MAGNESIUM: CPT | Performed by: NURSE PRACTITIONER

## 2024-12-15 PROCEDURE — 85025 COMPLETE CBC W/AUTO DIFF WBC: CPT | Mod: 91 | Performed by: NURSE PRACTITIONER

## 2024-12-15 PROCEDURE — 96375 TX/PRO/DX INJ NEW DRUG ADDON: CPT

## 2024-12-15 PROCEDURE — 25500020 PHARM REV CODE 255

## 2024-12-15 PROCEDURE — 25000003 PHARM REV CODE 250: Performed by: NURSE PRACTITIONER

## 2024-12-15 PROCEDURE — 25000003 PHARM REV CODE 250: Performed by: HOSPITALIST

## 2024-12-15 PROCEDURE — 80053 COMPREHEN METABOLIC PANEL: CPT | Performed by: STUDENT IN AN ORGANIZED HEALTH CARE EDUCATION/TRAINING PROGRAM

## 2024-12-15 RX ORDER — TALC
9 POWDER (GRAM) TOPICAL NIGHTLY PRN
Status: DISCONTINUED | OUTPATIENT
Start: 2024-12-15 | End: 2024-12-16 | Stop reason: HOSPADM

## 2024-12-15 RX ORDER — SODIUM,POTASSIUM PHOSPHATES 280-250MG
2 POWDER IN PACKET (EA) ORAL
Status: DISCONTINUED | OUTPATIENT
Start: 2024-12-15 | End: 2024-12-16 | Stop reason: HOSPADM

## 2024-12-15 RX ORDER — MORPHINE SULFATE 4 MG/ML
4 INJECTION, SOLUTION INTRAMUSCULAR; INTRAVENOUS EVERY 4 HOURS PRN
Status: DISCONTINUED | OUTPATIENT
Start: 2024-12-15 | End: 2024-12-16

## 2024-12-15 RX ORDER — HYDROMORPHONE HYDROCHLORIDE 1 MG/ML
1 INJECTION, SOLUTION INTRAMUSCULAR; INTRAVENOUS; SUBCUTANEOUS
Status: COMPLETED | OUTPATIENT
Start: 2024-12-15 | End: 2024-12-15

## 2024-12-15 RX ORDER — HYDROMORPHONE HYDROCHLORIDE 1 MG/ML
1 INJECTION, SOLUTION INTRAMUSCULAR; INTRAVENOUS; SUBCUTANEOUS EVERY 4 HOURS PRN
Status: DISCONTINUED | OUTPATIENT
Start: 2024-12-15 | End: 2024-12-16 | Stop reason: HOSPADM

## 2024-12-15 RX ORDER — AZELASTINE 1 MG/ML
1 SPRAY, METERED NASAL 2 TIMES DAILY
COMMUNITY
Start: 2024-10-18

## 2024-12-15 RX ORDER — IBUPROFEN 200 MG
24 TABLET ORAL
Status: DISCONTINUED | OUTPATIENT
Start: 2024-12-15 | End: 2024-12-16

## 2024-12-15 RX ORDER — MORPHINE SULFATE 4 MG/ML
8 INJECTION, SOLUTION INTRAMUSCULAR; INTRAVENOUS
Status: COMPLETED | OUTPATIENT
Start: 2024-12-15 | End: 2024-12-15

## 2024-12-15 RX ORDER — ACETAMINOPHEN 325 MG/1
650 TABLET ORAL EVERY 8 HOURS PRN
Status: DISCONTINUED | OUTPATIENT
Start: 2024-12-15 | End: 2024-12-16

## 2024-12-15 RX ORDER — ACETAMINOPHEN 325 MG/1
650 TABLET ORAL EVERY 4 HOURS PRN
Status: DISCONTINUED | OUTPATIENT
Start: 2024-12-15 | End: 2024-12-16 | Stop reason: HOSPADM

## 2024-12-15 RX ORDER — DICYCLOMINE HYDROCHLORIDE 10 MG/1
20 CAPSULE ORAL
Status: COMPLETED | OUTPATIENT
Start: 2024-12-15 | End: 2024-12-15

## 2024-12-15 RX ORDER — LANOLIN ALCOHOL/MO/W.PET/CERES
800 CREAM (GRAM) TOPICAL
Status: DISCONTINUED | OUTPATIENT
Start: 2024-12-15 | End: 2024-12-16 | Stop reason: HOSPADM

## 2024-12-15 RX ORDER — IBUPROFEN 200 MG
16 TABLET ORAL
Status: DISCONTINUED | OUTPATIENT
Start: 2024-12-15 | End: 2024-12-16

## 2024-12-15 RX ORDER — ONDANSETRON HYDROCHLORIDE 2 MG/ML
4 INJECTION, SOLUTION INTRAVENOUS
Status: COMPLETED | OUTPATIENT
Start: 2024-12-15 | End: 2024-12-15

## 2024-12-15 RX ORDER — GLUCAGON 1 MG
1 KIT INJECTION
Status: DISCONTINUED | OUTPATIENT
Start: 2024-12-15 | End: 2024-12-16

## 2024-12-15 RX ORDER — NALOXONE HCL 0.4 MG/ML
0.02 VIAL (ML) INJECTION
Status: DISCONTINUED | OUTPATIENT
Start: 2024-12-15 | End: 2024-12-16 | Stop reason: HOSPADM

## 2024-12-15 RX ORDER — SODIUM CHLORIDE 0.9 % (FLUSH) 0.9 %
3 SYRINGE (ML) INJECTION EVERY 12 HOURS PRN
Status: DISCONTINUED | OUTPATIENT
Start: 2024-12-15 | End: 2024-12-16 | Stop reason: HOSPADM

## 2024-12-15 RX ORDER — MORPHINE SULFATE 2 MG/ML
6 INJECTION, SOLUTION INTRAMUSCULAR; INTRAVENOUS
Status: COMPLETED | OUTPATIENT
Start: 2024-12-15 | End: 2024-12-15

## 2024-12-15 RX ORDER — ENOXAPARIN SODIUM 100 MG/ML
40 INJECTION SUBCUTANEOUS EVERY 24 HOURS
Status: DISCONTINUED | OUTPATIENT
Start: 2024-12-15 | End: 2024-12-16 | Stop reason: HOSPADM

## 2024-12-15 RX ORDER — SIMETHICONE 80 MG
1 TABLET,CHEWABLE ORAL 4 TIMES DAILY PRN
Status: DISCONTINUED | OUTPATIENT
Start: 2024-12-15 | End: 2024-12-16 | Stop reason: HOSPADM

## 2024-12-15 RX ORDER — ALUMINUM HYDROXIDE, MAGNESIUM HYDROXIDE, AND SIMETHICONE 1200; 120; 1200 MG/30ML; MG/30ML; MG/30ML
30 SUSPENSION ORAL 4 TIMES DAILY PRN
Status: DISCONTINUED | OUTPATIENT
Start: 2024-12-15 | End: 2024-12-16 | Stop reason: HOSPADM

## 2024-12-15 RX ORDER — ONDANSETRON HYDROCHLORIDE 2 MG/ML
8 INJECTION, SOLUTION INTRAVENOUS EVERY 6 HOURS PRN
Status: DISCONTINUED | OUTPATIENT
Start: 2024-12-15 | End: 2024-12-16 | Stop reason: HOSPADM

## 2024-12-15 RX ORDER — FLUTICASONE PROPIONATE 50 MCG
2 SPRAY, SUSPENSION (ML) NASAL 2 TIMES DAILY
COMMUNITY
Start: 2024-10-18

## 2024-12-15 RX ADMIN — HYDROMORPHONE HYDROCHLORIDE 1 MG: 1 INJECTION, SOLUTION INTRAMUSCULAR; INTRAVENOUS; SUBCUTANEOUS at 09:12

## 2024-12-15 RX ADMIN — MORPHINE SULFATE 6 MG: 2 INJECTION, SOLUTION INTRAMUSCULAR; INTRAVENOUS at 01:12

## 2024-12-15 RX ADMIN — IOHEXOL 100 ML: 350 INJECTION, SOLUTION INTRAVENOUS at 01:12

## 2024-12-15 RX ADMIN — PIPERACILLIN SODIUM AND TAZOBACTAM SODIUM 4.5 G: 4; .5 INJECTION, POWDER, LYOPHILIZED, FOR SOLUTION INTRAVENOUS at 07:12

## 2024-12-15 RX ADMIN — ONDANSETRON 4 MG: 2 INJECTION INTRAMUSCULAR; INTRAVENOUS at 01:12

## 2024-12-15 RX ADMIN — ONDANSETRON 8 MG: 2 INJECTION INTRAMUSCULAR; INTRAVENOUS at 09:12

## 2024-12-15 RX ADMIN — DICYCLOMINE HYDROCHLORIDE 20 MG: 10 CAPSULE ORAL at 02:12

## 2024-12-15 RX ADMIN — HYDROMORPHONE HYDROCHLORIDE 1 MG: 1 INJECTION, SOLUTION INTRAMUSCULAR; INTRAVENOUS; SUBCUTANEOUS at 05:12

## 2024-12-15 RX ADMIN — ENOXAPARIN SODIUM 40 MG: 40 INJECTION SUBCUTANEOUS at 07:12

## 2024-12-15 RX ADMIN — AMPICILLIN SODIUM AND SULBACTAM SODIUM 3 G: 2; 1 INJECTION, POWDER, FOR SOLUTION INTRAMUSCULAR; INTRAVENOUS at 06:12

## 2024-12-15 RX ADMIN — PIPERACILLIN SODIUM AND TAZOBACTAM SODIUM 4.5 G: 4; .5 INJECTION, POWDER, LYOPHILIZED, FOR SOLUTION INTRAVENOUS at 09:12

## 2024-12-15 RX ADMIN — MORPHINE SULFATE 8 MG: 4 INJECTION INTRAVENOUS at 04:12

## 2024-12-15 NOTE — ASSESSMENT & PLAN NOTE
Admit to obs  CT abdomen and pelvis revealed sigmoid diverticulitis  Pain meds PRN  Antiemetics PRN  Unasyn IV  Clear liquid diet, advance as tolerated

## 2024-12-15 NOTE — SUBJECTIVE & OBJECTIVE
Past Medical History:   Diagnosis Date    Diverticulosis     No pertinent past medical history        Past Surgical History:   Procedure Laterality Date     SECTION      SKIN GRAFT      UMBILICAL HERNIA REPAIR N/A 2022    Procedure: REPAIR, HERNIA, UMBILICAL, AGE 3 YEARS OR OLDER;  Surgeon: Shahram Henderson III, MD;  Location: [facility];  Service: General;  Laterality: N/A;       Review of patient's allergies indicates:  No Known Allergies    No current facility-administered medications on file prior to encounter.     Current Outpatient Medications on File Prior to Encounter   Medication Sig    diclofenac (VOLTAREN) 50 MG EC tablet Take 1 tablet (50 mg total) by mouth 3 (three) times daily as needed.    meclizine (ANTIVERT) 25 mg tablet Take 1 tablet (25 mg total) by mouth 3 (three) times daily as needed.    multivit,calc,mins/iron/folic (ONE-A-DAY WOMENS FORMULA ORAL) Take 1 tablet by mouth once daily.    ofloxacin (OCUFLOX) 0.3 % ophthalmic solution Place 1 drop into the right eye 4 (four) times daily.    VENTOLIN HFA 90 mcg/actuation inhaler 2 puffs every 4 (four) hours as needed.    [DISCONTINUED] medroxyPROGESTERone (DEPO-PROVERA) 150 mg/mL injection Inject 150 mg into the muscle every 3 (three) months. HCS     Family History       Problem Relation (Age of Onset)    Diabetes Mother    Hypertension Mother          Tobacco Use    Smoking status: Every Day     Current packs/day: 0.25     Types: Cigarettes    Smokeless tobacco: Never   Substance and Sexual Activity    Alcohol use: Yes     Comment: occ    Drug use: Never    Sexual activity: Yes     Partners: Male     Comment: Depot shot     Review of Systems   Constitutional:  Negative for chills and fever.   HENT:  Negative for congestion and sore throat.    Eyes:  Negative for visual disturbance.   Respiratory:  Negative for cough and shortness of breath.    Cardiovascular:  Negative for chest pain and palpitations.   Gastrointestinal:  Positive for  abdominal pain. Negative for nausea and vomiting.   Endocrine: Negative for cold intolerance and heat intolerance.   Genitourinary:  Negative for dysuria and hematuria.   Musculoskeletal:  Positive for back pain. Negative for arthralgias and myalgias.   Skin:  Negative for pallor and rash.   Neurological:  Negative for tremors and seizures.   Hematological:  Negative for adenopathy. Does not bruise/bleed easily.   Psychiatric/Behavioral:  Negative for hallucinations.    All other systems reviewed and are negative.    Objective:     Vital Signs (Most Recent):  Temp: 98.5 °F (36.9 °C) (12/14/24 2355)  Pulse: 90 (12/15/24 0433)  Resp: 18 (12/15/24 0426)  BP: (!) 111/57 (12/15/24 0433)  SpO2: 97 % (12/15/24 0433) Vital Signs (24h Range):  Temp:  [98.5 °F (36.9 °C)] 98.5 °F (36.9 °C)  Pulse:  [] 90  Resp:  [18] 18  SpO2:  [97 %-99 %] 97 %  BP: (111-164)/(57-97) 111/57     Weight: 88.5 kg (195 lb)  Body mass index is 31.47 kg/m².     Physical Exam  Vitals and nursing note reviewed.   Constitutional:       General: She is awake. She is not in acute distress.     Appearance: Normal appearance. She is well-developed. She is not ill-appearing.   HENT:      Head: Normocephalic and atraumatic.      Mouth/Throat:      Lips: Pink.      Mouth: Mucous membranes are moist.   Eyes:      Conjunctiva/sclera: Conjunctivae normal.      Pupils: Pupils are equal, round, and reactive to light.   Neck:      Thyroid: No thyromegaly.      Vascular: No JVD.   Cardiovascular:      Rate and Rhythm: Normal rate and regular rhythm.      Heart sounds: Normal heart sounds, S1 normal and S2 normal. No murmur heard.     No friction rub. No gallop.   Pulmonary:      Effort: Pulmonary effort is normal.      Breath sounds: Normal breath sounds.   Abdominal:      General: Bowel sounds are normal. There is no distension.      Palpations: Abdomen is soft. There is no mass.      Tenderness: There is abdominal tenderness in the right lower quadrant,  suprapubic area and left lower quadrant.   Musculoskeletal:         General: Normal range of motion.      Cervical back: Full passive range of motion without pain, normal range of motion and neck supple.   Skin:     General: Skin is warm and dry.      Capillary Refill: Capillary refill takes less than 2 seconds.   Neurological:      General: No focal deficit present.      Mental Status: She is alert and oriented to person, place, and time. Mental status is at baseline.      GCS: GCS eye subscore is 4. GCS verbal subscore is 5. GCS motor subscore is 6.      Cranial Nerves: No cranial nerve deficit.      Sensory: Sensation is intact.      Motor: Motor function is intact.   Psychiatric:         Behavior: Behavior normal. Behavior is cooperative.              CRANIAL NERVES     CN III, IV, VI   Pupils are equal, round, and reactive to light.       Significant Labs: All pertinent labs within the past 24 hours have been reviewed.  CBC:   Recent Labs   Lab 12/15/24  0010   WBC 13.17*   HGB 13.0   HCT 38.6        CMP:   Recent Labs   Lab 12/15/24  0010      K 3.8      CO2 20*   *   BUN 14   CREATININE 0.8   CALCIUM 9.1   PROT 7.1   ALBUMIN 3.7   BILITOT 0.2   ALKPHOS 50   AST 16   ALT 18   ANIONGAP 9     Urine Studies:   Recent Labs   Lab 12/15/24  0103   COLORU Yellow   APPEARANCEUA Clear   PHUR 7.0   SPECGRAV 1.030   PROTEINUA Negative   GLUCUA Negative   KETONESU Negative   BILIRUBINUA Negative   OCCULTUA Negative   NITRITE Negative   UROBILINOGEN Negative   LEUKOCYTESUR Negative       Significant Imaging: I have reviewed all pertinent imaging results/findings within the past 24 hours.  Imaging Results              CT Abdomen Pelvis With IV Contrast NO Oral Contrast (Final result)  Result time 12/15/24 01:45:08      Final result by Oniel Balderrama DO (12/15/24 01:45:08)                   Impression:      Acute, uncomplicated diverticulitis of the sigmoid colon.      Electronically signed  by: Oniel Balderrama  Date:    12/15/2024  Time:    01:45               Narrative:    EXAMINATION:  CT ABDOMEN PELVIS WITH IV CONTRAST    CLINICAL HISTORY:  Flank pain, kidney stone suspected;Nausea/vomiting;RLQ abdominal pain (Age >= 14y);    TECHNIQUE:  Axial CT images with sagittal and coronal reformats were obtained of the abdomen and pelvis from the hemidiaphragms through the symphysis pubis after the administration of 100mL Omnipaque 350.    COMPARISON:  CT of the abdomen and pelvis from 11/02/2023.    FINDINGS:  Lung Bases: Clear.    Heart: Heart size is normal.  No pericardial effusion.    Liver: The liver is enlarged.  There are no focal hepatic lesions.  The portal vasculature is patent.    Biliary tract: No intrahepatic or extrahepatic biliary ductal dilatation.    Gallbladder: No radiodense gallstone. No wall thickening or pericholecystic fluid.    Pancreas: Normal. No pancreatic ductal dilatation.    Spleen: Normal size without focal lesion.    Adrenals: Unremarkable.    Kidneys and urinary collecting systems: Normal.  No hydronephrosis or urolithiasis.    Lymph nodes: None enlarged.    Stomach and bowel: The stomach is normal.  There is wall thickening of the sigmoid colon with colonic diverticula, compatible with acute, uncomplicated diverticulitis.  There is no evidence of an adjacent abscess or gross free intraperitoneal air.  The appendix is normal.  There is no bowel obstruction.    Peritoneum and mesentery: No ascites or free intraperitoneal air.  No abdominal fluid collection.    Vasculature: No aneurysm or significant atherosclerosis.    Urinary bladder: No wall thickening.    Reproductive organs: The uterus and adnexae are unremarkable.  There is trace free fluid in pelvis which may be physiologic or may be reactive.    Body wall: No abnormality.    Musculoskeletal: No aggressive osseous lesion.

## 2024-12-15 NOTE — PLAN OF CARE
[facility] - Med/Surg  Initial Discharge Assessment       Primary Care Provider: No, Primary Doctor    Admission Diagnosis: Diverticulitis [K57.92]    Admission Date: 12/14/2024  Expected Discharge Date: 12/16/2024    Transition of Care Barriers: None    Payor: MEDICAID / Plan[place] [employer] / Product Type: Managed Medicaid /     Extended Emergency Contact Information  Primary Emergency Contact: Lauro [place] [address] [place]  Mobile Phone: [phone]  Relation: Significant other   needed? No  Secondary Emergency Contact: Endy Elizondo           [facility]  Mobile Phone: [phone]  Relation: Brother    Discharge Plan A: Home with family  Discharge Plan B: Home with family      [id number] - [place] - [address]  [address] [address]  Phone: [phone] Fax: [phone]    Discharge assessment completed at bedside, Pt verified all information is correct.She lives at home with two dependent children and their father will be her ride home. Pt currently needs PCP, Pharmacy is [facility]. Denies HH, Blood Thinners, Dialysis and DME.   Independent, drives self, insurance verified and denies POA and recent admission. Planning to discharge home when cleared.     Initial Assessment (most recent)       Adult Discharge Assessment - 12/15/24 1443          Discharge Assessment    Assessment Type Discharge Planning Assessment     Confirmed/corrected address, phone number and insurance Yes     Confirmed Demographics Correct on Facesheet     Source of Information patient     Does patient/caregiver understand observation status Yes     People in Home child(naty), dependent     Facility Arrived From: Home     Do you expect to return to your current living situation? Yes     Prior to hospitilization cognitive status: Unable to Assess      Current cognitive status: Alert/Oriented     Walking or Climbing Stairs Difficulty no     Dressing/Bathing Difficulty no     Equipment Currently Used at Home none     Readmission within 30 days? No     Patient currently being followed by outpatient case management? No     Do you currently have service(s) that help you manage your care at home? No     Do you take prescription medications? No     Do you have prescription coverage? Yes     Do you have any problems affording any of your prescribed medications? TBD     Who is going to help you get home at discharge? Kids Father     How do you get to doctors appointments? car, drives self     Are you on dialysis? No     Do you take coumadin? No     Discharge Plan A Home with family     Discharge Plan B Home with family     DME Needed Upon Discharge  none     Discharge Plan discussed with: Patient     Transition of Care Barriers None

## 2024-12-15 NOTE — ED PROVIDER NOTES
Encounter Date: 2024       History     Chief Complaint   Patient presents with    Back Pain     R side back pain radiating into lower R abd.     34 y.o. female with diverticulosis presents for abdominal pain and flank pain.  Patient reports right-sided flank/abdominal pain for the past day.  The pain is severe.  She denies any associated fever/chills, nausea/vomiting.  She denies history of similar pain in the past.  She denies any urinary symptoms    The history is provided by the patient and medical records.     Review of patient's allergies indicates:  No Known Allergies  Past Medical History:   Diagnosis Date    Diverticulosis     No pertinent past medical history      Past Surgical History:   Procedure Laterality Date     SECTION      SKIN GRAFT      UMBILICAL HERNIA REPAIR N/A 2022    Procedure: REPAIR, HERNIA, UMBILICAL, AGE 5 YEARS OR OLDER;  Surgeon: Shahram Henderson III, MD;  Location: [facility];  Service: General;  Laterality: N/A;     Family History   Problem Relation Name Age of Onset    Diabetes Mother      Hypertension Mother       Social History     Tobacco Use    Smoking status: Every Day     Current packs/day: 0.25     Types: Cigarettes    Smokeless tobacco: Never   Substance Use Topics    Alcohol use: Yes     Comment: occ    Drug use: Never     Review of Systems   Reason unable to perform ROS: See HPI for relevant ROS.       Physical Exam     Initial Vitals [24 2355]   BP Pulse Resp Temp SpO2   (!) 164/97 (!) 121 18 98.5 °F (36.9 °C) 98 %      MAP       --         Physical Exam    Nursing note and vitals reviewed.  Constitutional:   Alert, normal work of breathing, no acute distress   Eyes: Conjunctivae are normal. No scleral icterus.   Cardiovascular:  Normal rate, regular rhythm and intact distal pulses.           Pulmonary/Chest: Breath sounds normal. No stridor. No respiratory distress.   Abdominal:   Abdomen is soft, severe diffuse abdominal tenderness and tenderness  to percussion   Musculoskeletal:         General: No edema.     Neurological: She is alert.   Skin: Skin is warm and dry.         ED Course   Procedures  Labs Reviewed   CBC W/ AUTO DIFFERENTIAL - Abnormal       Result Value    WBC 13.17 (*)     RBC 3.88 (*)     Hemoglobin 13.0      Hematocrit 38.6       (*)     MCH 33.5 (*)     MCHC 33.7      RDW 11.8      Platelets 313      MPV 8.9 (*)     Immature Granulocytes 0.4      Gran # (ANC) 8.1 (*)     Immature Grans (Abs) 0.05 (*)     Lymph # 4.3      Mono # 0.5      Eos # 0.3      Baso # 0.05      nRBC 0      Gran % 61.3      Lymph % 32.3      Mono % 3.7 (*)     Eosinophil % 1.9      Basophil % 0.4      Differential Method Automated     COMPREHENSIVE METABOLIC PANEL - Abnormal    Sodium 139      Potassium 3.8      Chloride 110      CO2 20 (*)     Glucose 116 (*)     BUN 14      Creatinine 0.8      Calcium 9.1      Total Protein 7.1      Albumin 3.7      Total Bilirubin 0.2      Alkaline Phosphatase 50      AST 16      ALT 18      eGFR >60      Anion Gap 9     URINALYSIS, REFLEX TO URINE CULTURE    Specimen UA Urine, Clean Catch      Color, UA Yellow      Appearance, UA Clear      pH, UA 7.0      Specific Gravity, UA 1.030      Protein, UA Negative      Glucose, UA Negative      Ketones, UA Negative      Bilirubin (UA) Negative      Occult Blood UA Negative      Nitrite, UA Negative      Urobilinogen, UA Negative      Leukocytes, UA Negative      Narrative:     Specimen Source->Urine   MAGNESIUM   PHOSPHORUS   CBC W/ AUTO DIFFERENTIAL   COMPREHENSIVE METABOLIC PANEL   POCT URINE PREGNANCY    POC Preg Test, Ur Negative       Acceptable Yes            Imaging Results              CT Abdomen Pelvis With IV Contrast NO Oral Contrast (Final result)  Result time 12/15/24 01:45:08      Final result by Oniel Balderrama DO (12/15/24 01:45:08)                   Impression:      Acute, uncomplicated diverticulitis of the sigmoid colon.      Electronically  signed by: Oniel Balderrama  Date:    12/15/2024  Time:    01:45               Narrative:    EXAMINATION:  CT ABDOMEN PELVIS WITH IV CONTRAST    CLINICAL HISTORY:  Flank pain, kidney stone suspected;Nausea/vomiting;RLQ abdominal pain (Age >= 14y);    TECHNIQUE:  Axial CT images with sagittal and coronal reformats were obtained of the abdomen and pelvis from the hemidiaphragms through the symphysis pubis after the administration of 100mL Omnipaque 350.    COMPARISON:  CT of the abdomen and pelvis from 11/02/2023.    FINDINGS:  Lung Bases: Clear.    Heart: Heart size is normal.  No pericardial effusion.    Liver: The liver is enlarged.  There are no focal hepatic lesions.  The portal vasculature is patent.    Biliary tract: No intrahepatic or extrahepatic biliary ductal dilatation.    Gallbladder: No radiodense gallstone. No wall thickening or pericholecystic fluid.    Pancreas: Normal. No pancreatic ductal dilatation.    Spleen: Normal size without focal lesion.    Adrenals: Unremarkable.    Kidneys and urinary collecting systems: Normal.  No hydronephrosis or urolithiasis.    Lymph nodes: None enlarged.    Stomach and bowel: The stomach is normal.  There is wall thickening of the sigmoid colon with colonic diverticula, compatible with acute, uncomplicated diverticulitis.  There is no evidence of an adjacent abscess or gross free intraperitoneal air.  The appendix is normal.  There is no bowel obstruction.    Peritoneum and mesentery: No ascites or free intraperitoneal air.  No abdominal fluid collection.    Vasculature: No aneurysm or significant atherosclerosis.    Urinary bladder: No wall thickening.    Reproductive organs: The uterus and adnexae are unremarkable.  There is trace free fluid in pelvis which may be physiologic or may be reactive.    Body wall: No abnormality.    Musculoskeletal: No aggressive osseous lesion.                                       Medications   HYDROmorphone injection 1 mg (has no  administration in time range)   ampicillin-sulbactam (UNASYN) 3 g in 0.9% NaCl 100 mL IVPB (MB+) (has no administration in time range)   sodium chloride 0.9% flush 3 mL (has no administration in time range)   enoxaparin injection 40 mg (has no administration in time range)   melatonin tablet 9 mg (has no administration in time range)   ondansetron injection 8 mg (has no administration in time range)   acetaminophen tablet 650 mg (has no administration in time range)   simethicone chewable tablet 80 mg (has no administration in time range)   aluminum-magnesium hydroxide-simethicone 200-200-20 mg/5 mL suspension 30 mL (has no administration in time range)   acetaminophen tablet 650 mg (has no administration in time range)   naloxone 0.4 mg/mL injection 0.02 mg (has no administration in time range)   potassium bicarbonate disintegrating tablet 50 mEq (has no administration in time range)   potassium bicarbonate disintegrating tablet 35 mEq (has no administration in time range)   potassium bicarbonate disintegrating tablet 60 mEq (has no administration in time range)   magnesium oxide tablet 800 mg (has no administration in time range)   magnesium oxide tablet 800 mg (has no administration in time range)   potassium, sodium phosphates 280-160-250 mg packet 2 packet (has no administration in time range)   potassium, sodium phosphates 280-160-250 mg packet 2 packet (has no administration in time range)   potassium, sodium phosphates 280-160-250 mg packet 2 packet (has no administration in time range)   glucose chewable tablet 16 g (has no administration in time range)   glucose chewable tablet 24 g (has no administration in time range)   glucagon (human recombinant) injection 1 mg (has no administration in time range)   HYDROmorphone injection 1 mg (has no administration in time range)   morphine injection 4 mg (has no administration in time range)   dextrose 10% bolus 125 mL 125 mL (has no administration in time range)    dextrose 10% bolus 250 mL 250 mL (has no administration in time range)   ampicillin-sulbactam (UNASYN) 3 g in 0.9% NaCl 100 mL IVPB (MB+) (has no administration in time range)   morphine injection 6 mg (6 mg Intravenous Given 12/15/24 0116)   ondansetron injection 4 mg (4 mg Intravenous Given 12/15/24 0115)   iohexoL (OMNIPAQUE 350) 350 mg iodine/mL injection (100 mLs Intravenous Given 12/15/24 0128)   dicyclomine capsule 20 mg (20 mg Oral Given 12/15/24 0250)   morphine injection 8 mg (8 mg Intravenous Given 12/15/24 0426)     Medical Decision Making  34 y.o. female with diverticulosis presents for abdominal pain and flank pain  Differentials include kidney stone, gastroenteritis, diverticulitis, bowel obstruction, peritonitis, pyelonephritis  Patient presenting with right flank/abdominal pain, she has exquisite abdominal tenderness.  She is significantly uncomfortable and tachycardic on arrival, afebrile, no urinary symptoms, no pelvic symptoms  She denies any chest pain or shortness of breath    Amount and/or Complexity of Data Reviewed  External Data Reviewed: labs and notes.  Labs: ordered. Decision-making details documented in ED Course.  Radiology: ordered. Decision-making details documented in ED Course.    Risk  Prescription drug management.               ED Course as of 12/15/24 0538   Sun Dec 15, 2024   0118 CBC auto differential(!)  Leukocytosis, no anemia [OK]   0238 CT Abdomen Pelvis With IV Contrast NO Oral Contrast  Sigmoid diverticulitis [OK]   0535 Patient with continued pain despite multiple rounds of analgesia, will continue pain management, initiate antibiotics, admitted to hospital medicine [OK]      ED Course User Index  [OK] Bhaskar Burns MD                           Clinical Impression:  Final diagnoses:  [K57.92] Diverticulitis          ED Disposition Condition    Observation                 Bhaskar Burns MD  12/15/24 0531

## 2024-12-15 NOTE — ED NOTES
Assumed care from Noa:    Vanessa Guerra is awake, alert and oriented x 3, skin warm and dry, in NAD.  Hep Lock in place, site OK, side rails up, call bell within reach.

## 2024-12-15 NOTE — HPI
Vanessa Guerra is a 34 year old female with a past medical history of diverticulitis, who presented to the ED with a complaint of back pain. She reports the pain began in her right flank with radiation to the lower abdomen and is diffuse and severe. She denies fever, chills, nausea vomiting. She denies other complaint. ED work up included a CBC which shows a mildly elevated WBC count of 13.17. CMP was unremarkable. Urinalysis negative for evidence of infection. CT of the abdomen and pelvis was significant for sigmoid diverticulitis. She as initiated on Unasyn in the ED and given several doses of pain medication with minimal relief. Hospital Medicine consulted for admission and further management.

## 2024-12-15 NOTE — H&P
[facility]  History & Physical    Patient Name: Vanessa Guerra  MRN: [id number]  Patient Class: OP- Observation  Admission Date: 2024  Attending Physician: Belkys Lanier MD   Primary Care Provider: Vickie Primary Doctor         Patient information was obtained from patient, past medical records, and ER records.     Subjective:     Principal Problem:Diverticulitis    Chief Complaint:   Chief Complaint   Patient presents with    Back Pain     R side back pain radiating into lower R abd.        HPI:   Vanessa Guerra is a 34 year old female with a past medical history of diverticulitis, who presented to the ED with a complaint of back pain. She reports the pain began in her right flank with radiation to the lower abdomen and is diffuse and severe. She denies fever, chills, nausea vomiting. She denies other complaint. ED work up included a CBC which shows a mildly elevated WBC count of 13.17. CMP was unremarkable. Urinalysis negative for evidence of infection. CT of the abdomen and pelvis was significant for sigmoid diverticulitis. She as initiated on Unasyn in the ED and given several doses of pain medication with minimal relief. Hospital Medicine consulted for admission and further management.    Past Medical History:   Diagnosis Date    Diverticulosis     No pertinent past medical history        Past Surgical History:   Procedure Laterality Date     SECTION      SKIN GRAFT      UMBILICAL HERNIA REPAIR N/A 2022    Procedure: REPAIR, HERNIA, UMBILICAL, AGE 3 YEARS OR OLDER;  Surgeon: Shahram Henderson III, MD;  Location: [facility];  Service: General;  Laterality: N/A;       Review of patient's allergies indicates:  No Known Allergies    No current facility-administered medications on file prior to encounter.     Current Outpatient Medications on File Prior to Encounter   Medication Sig    diclofenac (VOLTAREN) 50 MG EC tablet Take 1 tablet (50 mg total) by  mouth 3 (three) times daily as needed.    meclizine (ANTIVERT) 25 mg tablet Take 1 tablet (25 mg total) by mouth 3 (three) times daily as needed.    multivit,calc,mins/iron/folic (ONE-A-DAY WOMENS FORMULA ORAL) Take 1 tablet by mouth once daily.    ofloxacin (OCUFLOX) 0.3 % ophthalmic solution Place 1 drop into the right eye 4 (four) times daily.    VENTOLIN HFA 90 mcg/actuation inhaler 2 puffs every 4 (four) hours as needed.    [DISCONTINUED] medroxyPROGESTERone (DEPO-PROVERA) 150 mg/mL injection Inject 150 mg into the muscle every 3 (three) months. HCS     Family History       Problem Relation (Age of Onset)    Diabetes Mother    Hypertension Mother          Tobacco Use    Smoking status: Every Day     Current packs/day: 0.25     Types: Cigarettes    Smokeless tobacco: Never   Substance and Sexual Activity    Alcohol use: Yes     Comment: occ    Drug use: Never    Sexual activity: Yes     Partners: Male     Comment: [place] shot     Review of Systems   Constitutional:  Negative for chills and fever.   HENT:  Negative for congestion and sore throat.    Eyes:  Negative for visual disturbance.   Respiratory:  Negative for cough and shortness of breath.    Cardiovascular:  Negative for chest pain and palpitations.   Gastrointestinal:  Positive for abdominal pain. Negative for nausea and vomiting.   Endocrine: Negative for cold intolerance and heat intolerance.   Genitourinary:  Negative for dysuria and hematuria.   Musculoskeletal:  Positive for back pain. Negative for arthralgias and myalgias.   Skin:  Negative for pallor and rash.   Neurological:  Negative for tremors and seizures.   Hematological:  Negative for adenopathy. Does not bruise/bleed easily.   Psychiatric/Behavioral:  Negative for hallucinations.    All other systems reviewed and are negative.    Objective:     Vital Signs (Most Recent):  Temp: 98.5 °F (36.9 °C) (12/14/24 3602)  Pulse: 90 (12/15/24 3773)  Resp: 18 (12/15/24 0426)  BP: (!) 111/57 (12/15/24  0433)  SpO2: 97 % (12/15/24 0433) Vital Signs (24h Range):  Temp:  [98.5 °F (36.9 °C)] 98.5 °F (36.9 °C)  Pulse:  [] 90  Resp:  [18] 18  SpO2:  [97 %-99 %] 97 %  BP: (111-164)/(57-97) 111/57     Weight: 88.5 kg (195 lb)  Body mass index is 31.47 kg/m².     Physical Exam  Vitals and nursing note reviewed.   Constitutional:       General: She is awake. She is not in acute distress.     Appearance: Normal appearance. She is well-developed. She is not ill-appearing.   HENT:      Head: Normocephalic and atraumatic.      Mouth/Throat:      Lips: Pink.      Mouth: Mucous membranes are moist.   Eyes:      Conjunctiva/sclera: Conjunctivae normal.      Pupils: Pupils are equal, round, and reactive to light.   Neck:      Thyroid: No thyromegaly.      Vascular: No JVD.   Cardiovascular:      Rate and Rhythm: Normal rate and regular rhythm.      Heart sounds: Normal heart sounds, S1 normal and S2 normal. No murmur heard.     No friction rub. No gallop.   Pulmonary:      Effort: Pulmonary effort is normal.      Breath sounds: Normal breath sounds.   Abdominal:      General: Bowel sounds are normal. There is no distension.      Palpations: Abdomen is soft. There is no mass.      Tenderness: There is abdominal tenderness in the right lower quadrant, suprapubic area and left lower quadrant.   Musculoskeletal:         General: Normal range of motion.      Cervical back: Full passive range of motion without pain, normal range of motion and neck supple.   Skin:     General: Skin is warm and dry.      Capillary Refill: Capillary refill takes less than 2 seconds.   Neurological:      General: No focal deficit present.      Mental Status: She is alert and oriented to person, place, and time. Mental status is at baseline.      GCS: GCS eye subscore is 4. GCS verbal subscore is 5. GCS motor subscore is 6.      Cranial Nerves: No cranial nerve deficit.      Sensory: Sensation is intact.      Motor: Motor function is intact.    Psychiatric:         Behavior: Behavior normal. Behavior is cooperative.              CRANIAL NERVES     CN III, IV, VI   Pupils are equal, round, and reactive to light.       Significant Labs: All pertinent labs within the past 24 hours have been reviewed.  CBC:   Recent Labs   Lab 12/15/24  0010   WBC 13.17*   HGB 13.0   HCT 38.6        CMP:   Recent Labs   Lab 12/15/24  0010      K 3.8      CO2 20*   *   BUN 14   CREATININE 0.8   CALCIUM 9.1   PROT 7.1   ALBUMIN 3.7   BILITOT 0.2   ALKPHOS 50   AST 16   ALT 18   ANIONGAP 9     Urine Studies:   Recent Labs   Lab 12/15/24  0103   COLORU Yellow   APPEARANCEUA Clear   PHUR 7.0   SPECGRAV 1.030   PROTEINUA Negative   GLUCUA Negative   KETONESU Negative   BILIRUBINUA Negative   OCCULTUA Negative   NITRITE Negative   UROBILINOGEN Negative   LEUKOCYTESUR Negative       Significant Imaging: I have reviewed all pertinent imaging results/findings within the past 24 hours.  Imaging Results              CT Abdomen Pelvis With IV Contrast NO Oral Contrast (Final result)  Result time 12/15/24 01:45:08      Final result by Oniel Balderrama DO (12/15/24 01:45:08)                   Impression:      Acute, uncomplicated diverticulitis of the sigmoid colon.      Electronically signed by: Oniel Balderrama  Date:    12/15/2024  Time:    01:45               Narrative:    EXAMINATION:  CT ABDOMEN PELVIS WITH IV CONTRAST    CLINICAL HISTORY:  Flank pain, kidney stone suspected;Nausea/vomiting;RLQ abdominal pain (Age >= 14y);    TECHNIQUE:  Axial CT images with sagittal and coronal reformats were obtained of the abdomen and pelvis from the hemidiaphragms through the symphysis pubis after the administration of 100mL Omnipaque 350.    COMPARISON:  CT of the abdomen and pelvis from 11/02/2023.    FINDINGS:  Lung Bases: Clear.    Heart: Heart size is normal.  No pericardial effusion.    Liver: The liver is enlarged.  There are no focal hepatic lesions.  The portal  vasculature is patent.    Biliary tract: No intrahepatic or extrahepatic biliary ductal dilatation.    Gallbladder: No radiodense gallstone. No wall thickening or pericholecystic fluid.    Pancreas: Normal. No pancreatic ductal dilatation.    Spleen: Normal size without focal lesion.    Adrenals: Unremarkable.    Kidneys and urinary collecting systems: Normal.  No hydronephrosis or urolithiasis.    Lymph nodes: None enlarged.    Stomach and bowel: The stomach is normal.  There is wall thickening of the sigmoid colon with colonic diverticula, compatible with acute, uncomplicated diverticulitis.  There is no evidence of an adjacent abscess or gross free intraperitoneal air.  The appendix is normal.  There is no bowel obstruction.    Peritoneum and mesentery: No ascites or free intraperitoneal air.  No abdominal fluid collection.    Vasculature: No aneurysm or significant atherosclerosis.    Urinary bladder: No wall thickening.    Reproductive organs: The uterus and adnexae are unremarkable.  There is trace free fluid in pelvis which may be physiologic or may be reactive.    Body wall: No abnormality.    Musculoskeletal: No aggressive osseous lesion.                                      Assessment/Plan:     * Diverticulitis  Admit to obs  CT abdomen and pelvis revealed sigmoid diverticulitis  Pain meds PRN  Antiemetics PRN  Unasyn IV  Clear liquid diet, advance as tolerated        VTE Risk Mitigation (From admission, onward)           Ordered     enoxaparin injection 40 mg  Daily         12/15/24 0531     IP VTE HIGH RISK PATIENT  Once         12/15/24 0531     Place sequential compression device  Until discontinued         12/15/24 0531                                MONO Goodman  Department of Hospital Medicine  [facility]

## 2024-12-16 VITALS
SYSTOLIC BLOOD PRESSURE: 130 MMHG | WEIGHT: 207.25 LBS | OXYGEN SATURATION: 99 % | TEMPERATURE: 99 F | RESPIRATION RATE: 16 BRPM | DIASTOLIC BLOOD PRESSURE: 79 MMHG | BODY MASS INDEX: 33.31 KG/M2 | HEIGHT: 66 IN | HEART RATE: 80 BPM

## 2024-12-16 PROBLEM — K08.89 PAIN, DENTAL: Status: RESOLVED | Noted: 2024-08-02 | Resolved: 2024-12-16

## 2024-12-16 PROBLEM — K04.7 DENTAL ABSCESS: Status: RESOLVED | Noted: 2024-08-02 | Resolved: 2024-12-16

## 2024-12-16 PROBLEM — E66.01 SEVERE OBESITY (BMI >= 40): Status: ACTIVE | Noted: 2024-12-16

## 2024-12-16 PROBLEM — E66.9 OBESITY (BMI 30-39.9): Status: ACTIVE | Noted: 2024-12-16

## 2024-12-16 PROBLEM — F17.200 TOBACCO DEPENDENCE: Status: ACTIVE | Noted: 2024-12-16

## 2024-12-16 LAB
ALBUMIN SERPL BCP-MCNC: 3.3 G/DL (ref 3.5–5.2)
ALP SERPL-CCNC: 49 U/L (ref 40–150)
ALT SERPL W/O P-5'-P-CCNC: 15 U/L (ref 10–44)
ANION GAP SERPL CALC-SCNC: 5 MMOL/L (ref 8–16)
AST SERPL-CCNC: 13 U/L (ref 10–40)
BASOPHILS # BLD AUTO: 0.04 K/UL (ref 0–0.2)
BASOPHILS NFR BLD: 0.6 % (ref 0–1.9)
BILIRUB SERPL-MCNC: 0.3 MG/DL (ref 0.1–1)
BUN SERPL-MCNC: 10 MG/DL (ref 6–20)
CALCIUM SERPL-MCNC: 8.5 MG/DL (ref 8.7–10.5)
CHLORIDE SERPL-SCNC: 111 MMOL/L (ref 95–110)
CO2 SERPL-SCNC: 22 MMOL/L (ref 23–29)
CREAT SERPL-MCNC: 0.7 MG/DL (ref 0.5–1.4)
DIFFERENTIAL METHOD BLD: ABNORMAL
EOSINOPHIL # BLD AUTO: 0.2 K/UL (ref 0–0.5)
EOSINOPHIL NFR BLD: 3.4 % (ref 0–8)
ERYTHROCYTE [DISTWIDTH] IN BLOOD BY AUTOMATED COUNT: 11.6 % (ref 11.5–14.5)
EST. GFR  (NO RACE VARIABLE): >60 ML/MIN/1.73 M^2
GLUCOSE SERPL-MCNC: 93 MG/DL (ref 70–110)
HCT VFR BLD AUTO: 36.5 % (ref 37–48.5)
HGB BLD-MCNC: 12.1 G/DL (ref 12–16)
IMM GRANULOCYTES # BLD AUTO: 0.04 K/UL (ref 0–0.04)
IMM GRANULOCYTES NFR BLD AUTO: 0.6 % (ref 0–0.5)
LYMPHOCYTES # BLD AUTO: 2.3 K/UL (ref 1–4.8)
LYMPHOCYTES NFR BLD: 34.3 % (ref 18–48)
MAGNESIUM SERPL-MCNC: 2.1 MG/DL (ref 1.6–2.6)
MCH RBC QN AUTO: 33.3 PG (ref 27–31)
MCHC RBC AUTO-ENTMCNC: 33.2 G/DL (ref 32–36)
MCV RBC AUTO: 101 FL (ref 82–98)
MONOCYTES # BLD AUTO: 0.3 K/UL (ref 0.3–1)
MONOCYTES NFR BLD: 4.5 % (ref 4–15)
NEUTROPHILS # BLD AUTO: 3.9 K/UL (ref 1.8–7.7)
NEUTROPHILS NFR BLD: 56.6 % (ref 38–73)
NRBC BLD-RTO: 0 /100 WBC
PHOSPHATE SERPL-MCNC: 3.4 MG/DL (ref 2.7–4.5)
PLATELET # BLD AUTO: 264 K/UL (ref 150–450)
PMV BLD AUTO: 9.2 FL (ref 9.2–12.9)
POTASSIUM SERPL-SCNC: 4.1 MMOL/L (ref 3.5–5.1)
PROT SERPL-MCNC: 6.4 G/DL (ref 6–8.4)
RBC # BLD AUTO: 3.63 M/UL (ref 4–5.4)
SODIUM SERPL-SCNC: 138 MMOL/L (ref 136–145)
WBC # BLD AUTO: 6.82 K/UL (ref 3.9–12.7)

## 2024-12-16 PROCEDURE — 36415 COLL VENOUS BLD VENIPUNCTURE: CPT | Performed by: NURSE PRACTITIONER

## 2024-12-16 PROCEDURE — 85025 COMPLETE CBC W/AUTO DIFF WBC: CPT | Performed by: NURSE PRACTITIONER

## 2024-12-16 PROCEDURE — 63600175 PHARM REV CODE 636 W HCPCS: Performed by: HOSPITALIST

## 2024-12-16 PROCEDURE — 80053 COMPREHEN METABOLIC PANEL: CPT | Performed by: NURSE PRACTITIONER

## 2024-12-16 PROCEDURE — 83735 ASSAY OF MAGNESIUM: CPT | Performed by: NURSE PRACTITIONER

## 2024-12-16 PROCEDURE — 96366 THER/PROPH/DIAG IV INF ADDON: CPT

## 2024-12-16 PROCEDURE — G0378 HOSPITAL OBSERVATION PER HR: HCPCS

## 2024-12-16 PROCEDURE — 25000003 PHARM REV CODE 250: Performed by: HOSPITALIST

## 2024-12-16 PROCEDURE — 84100 ASSAY OF PHOSPHORUS: CPT | Performed by: NURSE PRACTITIONER

## 2024-12-16 RX ORDER — OXYCODONE AND ACETAMINOPHEN 5; 325 MG/1; MG/1
1 TABLET ORAL EVERY 6 HOURS PRN
Qty: 15 EACH | Refills: 0 | Status: SHIPPED | OUTPATIENT
Start: 2024-12-16 | End: 2024-12-21

## 2024-12-16 RX ORDER — CIPROFLOXACIN 500 MG/1
500 TABLET ORAL EVERY 12 HOURS
Qty: 10 TABLET | Refills: 0 | Status: SHIPPED | OUTPATIENT
Start: 2024-12-16 | End: 2024-12-21

## 2024-12-16 RX ORDER — OXYCODONE AND ACETAMINOPHEN 5; 325 MG/1; MG/1
1 TABLET ORAL EVERY 6 HOURS PRN
Status: DISCONTINUED | OUTPATIENT
Start: 2024-12-16 | End: 2024-12-16 | Stop reason: HOSPADM

## 2024-12-16 RX ORDER — ONDANSETRON 4 MG/1
4 TABLET, ORALLY DISINTEGRATING ORAL EVERY 6 HOURS PRN
Qty: 15 TABLET | Refills: 0 | Status: SHIPPED | OUTPATIENT
Start: 2024-12-16 | End: 2024-12-21

## 2024-12-16 RX ORDER — METRONIDAZOLE 500 MG/1
500 TABLET ORAL EVERY 8 HOURS
Qty: 15 TABLET | Refills: 0 | Status: SHIPPED | OUTPATIENT
Start: 2024-12-16 | End: 2024-12-21

## 2024-12-16 RX ORDER — SODIUM CHLORIDE, SODIUM LACTATE, POTASSIUM CHLORIDE, CALCIUM CHLORIDE 600; 310; 30; 20 MG/100ML; MG/100ML; MG/100ML; MG/100ML
INJECTION, SOLUTION INTRAVENOUS CONTINUOUS
Status: DISCONTINUED | OUTPATIENT
Start: 2024-12-16 | End: 2024-12-16 | Stop reason: HOSPADM

## 2024-12-16 RX ADMIN — PIPERACILLIN SODIUM AND TAZOBACTAM SODIUM 4.5 G: 4; .5 INJECTION, POWDER, LYOPHILIZED, FOR SOLUTION INTRAVENOUS at 03:12

## 2024-12-16 NOTE — PROGRESS NOTES
"[facility] Medicine  Progress Note    Patient Name: Vanessa Guerra  MRN: [id number]  Patient Class: OP- Observation   Admission Date: 12/14/2024  Length of Stay: 0 days  Attending Physician: Saad Sanchez MD  Primary Care Provider: Vickie, Primary Doctor        Subjective     Principal Problem:Diverticulitis        HPI:    Vanessa Guerra is a 34 year old female with a past medical history of diverticulitis, who presented to the ED with a complaint of back pain. She reports the pain began in her right flank with radiation to the lower abdomen and is diffuse and severe. She denies fever, chills, nausea vomiting. She denies other complaint. ED work up included a CBC which shows a mildly elevated WBC count of 13.17. CMP was unremarkable. Urinalysis negative for evidence of infection. CT of the abdomen and pelvis was significant for sigmoid diverticulitis. She as initiated on Unasyn in the ED and given several doses of pain medication with minimal relief. Hospital Medicine consulted for admission and further management.    Overview/Hospital Course:  Vanessa Guerra is a 34 year old female with a past medical history of obesity, diverticulitis and tobacco dependence who presented with acute diverticulitis. She is on IV fluids and Zosyn. A CLQ diet has been ordered and well as PRN analgesics and antiemetics.    Interval History: see "Hospital Course"    Review of Systems   Gastrointestinal:  Positive for abdominal pain.     Objective:     Vital Signs (Most Recent):  Temp: 98.5 °F (36.9 °C) (12/16/24 0745)  Pulse: 71 (12/16/24 0745)  Resp: 16 (12/16/24 0356)  BP: 131/74 (12/16/24 0745)  SpO2: 100 % (12/16/24 0745) Vital Signs (24h Range):  Temp:  [98.2 °F (36.8 °C)-98.6 °F (37 °C)] 98.5 °F (36.9 °C)  Pulse:  [62-99] 71  Resp:  [16-18] 16  SpO2:  [96 %-100 %] 100 %  BP: (100-131)/(50-76) 131/74     Weight: 94 kg (207 lb 3.7 oz)  Body mass index is 33.45 kg/m².  No intake or output data in " the 24 hours ending 12/16/24 1059      Physical Exam  Vitals and nursing note reviewed.   Constitutional:       General: She is not in acute distress.     Appearance: She is obese.   HENT:      Head: Normocephalic and atraumatic.      Right Ear: External ear normal.      Left Ear: External ear normal.      Nose: Nose normal.      Mouth/Throat:      Mouth: Mucous membranes are moist.   Eyes:      Extraocular Movements: Extraocular movements intact.      Conjunctiva/sclera: Conjunctivae normal.   Cardiovascular:      Rate and Rhythm: Normal rate and regular rhythm.      Pulses: Normal pulses.      Heart sounds: Normal heart sounds.   Pulmonary:      Effort: Pulmonary effort is normal.      Breath sounds: Normal breath sounds.   Abdominal:      General: Bowel sounds are normal.      Tenderness: There is abdominal tenderness.   Musculoskeletal:         General: Normal range of motion.      Cervical back: Normal range of motion and neck supple.      Right lower leg: No edema.      Left lower leg: No edema.   Skin:     General: Skin is warm and dry.   Neurological:      Mental Status: She is alert. Mental status is at baseline.   Psychiatric:         Mood and Affect: Mood normal.         Behavior: Behavior normal.             Significant Labs: All pertinent labs within the past 24 hours have been reviewed.    Significant Imaging: I have reviewed all pertinent imaging results/findings within the past 24 hours.    Assessment and Plan     * Diverticulitis  -Zosyn  -IV fluids  -CLQ diet; advance as tolerated  -PRN analgesics and antiemetics    Tobacco dependence  -Patient to be counseled on cessation    Obesity (BMI 30-39.9)  Body mass index is 33.45 kg/m². Morbid obesity complicates all aspects of disease management from diagnostic modalities to treatment.         VTE Risk Mitigation (From admission, onward)           Ordered     enoxaparin injection 40 mg  Daily         12/15/24 0531     IP VTE HIGH RISK PATIENT  Once          12/15/24 0531     Place sequential compression device  Until discontinued         12/15/24 0531                    Discharge Planning   EDWARD: 12/16/2024     Code Status: Full Code   Medical Readiness for Discharge Date:   Discharge Plan A: Home with family                        Saad Sanchez MD  Department of Hospital Medicine   [facility]/Surg

## 2024-12-16 NOTE — DISCHARGE SUMMARY
[facility] Medicine  Discharge Summary      Patient Name: Vanessa Guerra  MRN: [id number]  FABIOLA: [id number]  Patient Class: OP- Observation  Admission Date: 12/14/2024  Hospital Length of Stay: 0 days  Discharge Date and Time: No discharge date for patient encounter.  Attending Physician: Saad Sanchez MD   Discharging Provider: Saad Sanchez MD  Primary Care Provider: Vickie, Primary Doctor    Primary Care Team: Networked reference to record PCT     HPI:     Vanessa Guerra is a 34 year old female with a past medical history of diverticulitis, who presented to the ED with a complaint of back pain. She reports the pain began in her right flank with radiation to the lower abdomen and is diffuse and severe. She denies fever, chills, nausea vomiting. She denies other complaint. ED work up included a CBC which shows a mildly elevated WBC count of 13.17. CMP was unremarkable. Urinalysis negative for evidence of infection. CT of the abdomen and pelvis was significant for sigmoid diverticulitis. She as initiated on Unasyn in the ED and given several doses of pain medication with minimal relief. Hospital Medicine consulted for admission and further management.    * No surgery found *      Hospital Course:   Vanessa Guerra is a 34 year old female with a past medical history of obesity, diverticulitis and tobacco dependence who presented with acute diverticulitis. She is on IV fluids and Zosyn. A CLQ diet was ordered and advanced as well as PRN analgesics and antiemetics. She was discharged 12/16 with Cipro and Flagyl to complete one week.     Goals of Care Treatment Preferences:  Code Status: Full Code      SDOH Screening:  The patient declined to be screened for utility difficulties, food insecurity, transport difficulties, housing insecurity, and interpersonal safety, so no concerns could be identified this admission.     Consults:     * Diverticulitis  -Zosyn to Cipro/Flagyl for one  week  -IV fluids  -CLQ diet; advance as tolerated  -PRN analgesics and antiemetics    Tobacco dependence  -Patient counseled on cessation    Obesity (BMI 30-39.9)  Body mass index is 33.45 kg/m². Morbid obesity complicates all aspects of disease management from diagnostic modalities to treatment.         Final Active Diagnoses:    Diagnosis Date Noted POA    PRINCIPAL PROBLEM:  Diverticulitis [K57.92] 12/05/2021 Yes    Obesity (BMI 30-39.9) [E66.9] 12/16/2024 Yes    Tobacco dependence [F17.200] 12/16/2024 Yes      Problems Resolved During this Admission:       Discharged Condition: stable    Disposition: Home or Self Care    Follow Up:    Patient Instructions:      Diet Adult Regular     Notify your health care provider if you experience any of the following:  increased confusion or weakness     Notify your health care provider if you experience any of the following:  persistent dizziness, light-headedness, or visual disturbances     Notify your health care provider if you experience any of the following:  difficulty breathing or increased cough     Notify your health care provider if you experience any of the following:  severe persistent headache     Notify your health care provider if you experience any of the following:  severe uncontrolled pain     Notify your health care provider if you experience any of the following:  persistent nausea and vomiting or diarrhea     Notify your health care provider if you experience any of the following:  temperature >100.4     Activity as tolerated       Significant Diagnostic Studies: Labs: All labs within the past 24 hours have been reviewed    Pending Diagnostic Studies:       None           Medications:  Reconciled Home Medications:      Medication List        START taking these medications      ciprofloxacin HCl 500 MG tablet  Commonly known as: CIPRO  Take 1 tablet (500 mg total) by mouth every 12 (twelve) hours. for 5 days     metroNIDAZOLE 500 MG tablet  Commonly  known as: FLAGYL  Take 1 tablet (500 mg total) by mouth every 8 (eight) hours. for 5 days     ondansetron 4 MG Tbdl  Commonly known as: ZOFRAN-ODT  Take 1 tablet (4 mg total) by mouth every 6 (six) hours as needed (Nausea).     oxyCODONE-acetaminophen 5-325 mg per tablet  Commonly known as: PERCOCET  Take 1 tablet by mouth every 6 (six) hours as needed for Pain.            CONTINUE taking these medications      azelastine 137 mcg (0.1 %) nasal spray  Commonly known as: ASTELIN  1 spray by Nasal route 2 (two) times daily.     diclofenac 50 MG EC tablet  Commonly known as: VOLTAREN  Take 1 tablet (50 mg total) by mouth 3 (three) times daily as needed.     fluticasone propionate 50 mcg/actuation nasal spray  Commonly known as: FLONASE  2 sprays by Each Nostril route 2 (two) times daily.     ONE-A-DAY WOMENS FORMULA ORAL  Take 1 tablet by mouth once daily.     VENTOLIN HFA 90 mcg/actuation inhaler  Generic drug: albuterol  Inhale 2 puffs into the lungs every 4 (four) hours as needed.              Indwelling Lines/Drains at time of discharge:   Lines/Drains/Airways       None                   Time spent on the discharge of patient: 37 minutes         Saad Sanchez MD  Department of Hospital Medicine  [facility]/Surg

## 2024-12-16 NOTE — PLAN OF CARE
Pt cleared for DC by CM to home with no needs.     Email sent to [employer] to contact pt with new patient/hospital follow up appt.    12/16/24 7030   Final Note   Assessment Type Final Discharge Note   Anticipated Discharge Disposition Home   Hospital Resources/Appts/Education Provided Appointment suggestion unavailable

## 2024-12-16 NOTE — PLAN OF CARE
POC/Meds reviewed, pt verbalized understanding. Vitals stable.  Afebrile. Iv abx given per mar. Up with x1 assist. Repositions self. Hourly/Q2hr rounding performed, safety maintained. Bed in lowest position, wheels locked, SR up x2, call light in easy reach. No  complaints at this time.

## 2024-12-16 NOTE — PLAN OF CARE
POC reviewed Verbalizes understanding AAOx 4 Vss afebrile denies pain tolerated clear liquid and soft diet without complications, denies nausea denies abdominal discomfort

## 2024-12-16 NOTE — HOSPITAL COURSE
Vanessa Guerra is a 34 year old female with a past medical history of obesity, diverticulitis and tobacco dependence who presented with acute diverticulitis. She is on IV fluids and Zosyn. A CLQ diet was ordered and advanced as well as PRN analgesics and antiemetics. She was discharged 12/16 with Cipro and Flagyl to complete one week.

## 2024-12-16 NOTE — SUBJECTIVE & OBJECTIVE
"Interval History: see "Hospital Course"    Review of Systems   Gastrointestinal:  Positive for abdominal pain.     Objective:     Vital Signs (Most Recent):  Temp: 98.5 °F (36.9 °C) (12/16/24 0745)  Pulse: 71 (12/16/24 0745)  Resp: 16 (12/16/24 0356)  BP: 131/74 (12/16/24 0745)  SpO2: 100 % (12/16/24 0745) Vital Signs (24h Range):  Temp:  [98.2 °F (36.8 °C)-98.6 °F (37 °C)] 98.5 °F (36.9 °C)  Pulse:  [62-99] 71  Resp:  [16-18] 16  SpO2:  [96 %-100 %] 100 %  BP: (100-131)/(50-76) 131/74     Weight: 94 kg (207 lb 3.7 oz)  Body mass index is 33.45 kg/m².  No intake or output data in the 24 hours ending 12/16/24 1059      Physical Exam  Vitals and nursing note reviewed.   Constitutional:       General: She is not in acute distress.     Appearance: She is obese.   HENT:      Head: Normocephalic and atraumatic.      Right Ear: External ear normal.      Left Ear: External ear normal.      Nose: Nose normal.      Mouth/Throat:      Mouth: Mucous membranes are moist.   Eyes:      Extraocular Movements: Extraocular movements intact.      Conjunctiva/sclera: Conjunctivae normal.   Cardiovascular:      Rate and Rhythm: Normal rate and regular rhythm.      Pulses: Normal pulses.      Heart sounds: Normal heart sounds.   Pulmonary:      Effort: Pulmonary effort is normal.      Breath sounds: Normal breath sounds.   Abdominal:      General: Bowel sounds are normal.      Tenderness: There is abdominal tenderness.   Musculoskeletal:         General: Normal range of motion.      Cervical back: Normal range of motion and neck supple.      Right lower leg: No edema.      Left lower leg: No edema.   Skin:     General: Skin is warm and dry.   Neurological:      Mental Status: She is alert. Mental status is at baseline.   Psychiatric:         Mood and Affect: Mood normal.         Behavior: Behavior normal.             Significant Labs: All pertinent labs within the past 24 hours have been reviewed.    Significant Imaging: I have reviewed " all pertinent imaging results/findings within the past 24 hours.

## 2024-12-16 NOTE — DISCHARGE INSTRUCTIONS
.Our goal at [facility] is to always give you outstanding care and exceptional service. You may receive a survey by mail, text or e-mail in the next 7-10 days from Rosita Sanders and our leadership team asking about the care you received with us. The survey should only take 5-10 minutes to complete and is very important to us.     Your feedback provides us with a way to recognize our staff who work tirelessly to provide the best care! Also, your responses help us learn how to improve when your experience was below our aspiration of excellence. We WILL use your feedback to continue making improvements to help us provide the highest quality care. We keep your personal information and feedback confidential. We appreciate your time completing this survey and can't wait to hear from you!!!     We want you to leave us today feeling like you can DEFINITELY recommend us to others! We look forward to your continued care with us! Thanks so much for choosing [facility] for your healthcare needs!     Resume home medications Avoid fatty greasy spicy foods stay hydrated drink plenty of fluids if you have further problems go to nearest emergency room.

## 2024-12-26 ENCOUNTER — HOSPITAL ENCOUNTER (OUTPATIENT)
Facility: HOSPITAL | Age: 34
Discharge: HOME | End: 2024-12-28
Attending: STUDENT IN AN ORGANIZED HEALTH CARE EDUCATION/TRAINING PROGRAM | Admitting: STUDENT IN AN ORGANIZED HEALTH CARE EDUCATION/TRAINING PROGRAM
Payer: MEDICAID

## 2024-12-26 DIAGNOSIS — K57.92 DIVERTICULITIS: Primary | ICD-10-CM

## 2024-12-26 DIAGNOSIS — R07.9 CHEST PAIN: ICD-10-CM

## 2024-12-26 LAB
ALBUMIN SERPL BCP-MCNC: 3.6 G/DL (ref 3.5–5.2)
ALP SERPL-CCNC: 48 U/L (ref 40–150)
ALT SERPL W/O P-5'-P-CCNC: 13 U/L (ref 10–44)
ANION GAP SERPL CALC-SCNC: 10 MMOL/L (ref 8–16)
AST SERPL-CCNC: 15 U/L (ref 10–40)
B-HCG UR QL: NEGATIVE
BASOPHILS # BLD AUTO: 0.03 K/UL (ref 0–0.2)
BASOPHILS NFR BLD: 0.6 % (ref 0–1.9)
BILIRUB SERPL-MCNC: 0.4 MG/DL (ref 0.1–1)
BILIRUB UR QL STRIP: NEGATIVE
BUN SERPL-MCNC: 10 MG/DL (ref 6–20)
CALCIUM SERPL-MCNC: 8.5 MG/DL (ref 8.7–10.5)
CHLORIDE SERPL-SCNC: 109 MMOL/L (ref 95–110)
CLARITY UR: CLEAR
CO2 SERPL-SCNC: 19 MMOL/L (ref 23–29)
COLOR UR: YELLOW
CREAT SERPL-MCNC: 0.8 MG/DL (ref 0.5–1.4)
CTP QC/QA: YES
DIFFERENTIAL METHOD BLD: ABNORMAL
EOSINOPHIL # BLD AUTO: 0.1 K/UL (ref 0–0.5)
EOSINOPHIL NFR BLD: 1.8 % (ref 0–8)
ERYTHROCYTE [DISTWIDTH] IN BLOOD BY AUTOMATED COUNT: 11.7 % (ref 11.5–14.5)
EST. GFR  (NO RACE VARIABLE): >60 ML/MIN/1.73 M^2
GLUCOSE SERPL-MCNC: 92 MG/DL (ref 70–110)
GLUCOSE UR QL STRIP: NEGATIVE
HCT VFR BLD AUTO: 36 % (ref 37–48.5)
HCV AB SERPL QL IA: NEGATIVE
HGB BLD-MCNC: 12.2 G/DL (ref 12–16)
HGB UR QL STRIP: NEGATIVE
HIV 1+2 AB+HIV1 P24 AG SERPL QL IA: NEGATIVE
IMM GRANULOCYTES # BLD AUTO: 0.03 K/UL (ref 0–0.04)
IMM GRANULOCYTES NFR BLD AUTO: 0.6 % (ref 0–0.5)
KETONES UR QL STRIP: NEGATIVE
LACTATE SERPL-SCNC: 0.5 MMOL/L (ref 0.5–2.2)
LEUKOCYTE ESTERASE UR QL STRIP: NEGATIVE
LIPASE SERPL-CCNC: 8 U/L (ref 4–60)
LYMPHOCYTES # BLD AUTO: 1.6 K/UL (ref 1–4.8)
LYMPHOCYTES NFR BLD: 31.1 % (ref 18–48)
MCH RBC QN AUTO: 33.7 PG (ref 27–31)
MCHC RBC AUTO-ENTMCNC: 33.9 G/DL (ref 32–36)
MCV RBC AUTO: 99 FL (ref 82–98)
MONOCYTES # BLD AUTO: 0.2 K/UL (ref 0.3–1)
MONOCYTES NFR BLD: 4.6 % (ref 4–15)
NEUTROPHILS # BLD AUTO: 3.1 K/UL (ref 1.8–7.7)
NEUTROPHILS NFR BLD: 61.3 % (ref 38–73)
NITRITE UR QL STRIP: NEGATIVE
NRBC BLD-RTO: 0 /100 WBC
PH UR STRIP: 6 [PH] (ref 5–8)
PLATELET # BLD AUTO: 293 K/UL (ref 150–450)
PLATELET BLD QL SMEAR: ABNORMAL
PMV BLD AUTO: 8.8 FL (ref 9.2–12.9)
POTASSIUM SERPL-SCNC: 3.6 MMOL/L (ref 3.5–5.1)
PROT SERPL-MCNC: 6.9 G/DL (ref 6–8.4)
PROT UR QL STRIP: ABNORMAL
RBC # BLD AUTO: 3.62 M/UL (ref 4–5.4)
SODIUM SERPL-SCNC: 138 MMOL/L (ref 136–145)
SP GR UR STRIP: >1.03 (ref 1–1.03)
URN SPEC COLLECT METH UR: ABNORMAL
UROBILINOGEN UR STRIP-ACNC: NEGATIVE EU/DL
WBC # BLD AUTO: 5.02 K/UL (ref 3.9–12.7)

## 2024-12-26 PROCEDURE — G0378 HOSPITAL OBSERVATION PER HR: HCPCS

## 2024-12-26 PROCEDURE — 83690 ASSAY OF LIPASE: CPT | Performed by: NURSE PRACTITIONER

## 2024-12-26 PROCEDURE — 80053 COMPREHEN METABOLIC PANEL: CPT | Performed by: NURSE PRACTITIONER

## 2024-12-26 PROCEDURE — 96361 HYDRATE IV INFUSION ADD-ON: CPT

## 2024-12-26 PROCEDURE — 25500020 PHARM REV CODE 255: Performed by: STUDENT IN AN ORGANIZED HEALTH CARE EDUCATION/TRAINING PROGRAM

## 2024-12-26 PROCEDURE — 96376 TX/PRO/DX INJ SAME DRUG ADON: CPT

## 2024-12-26 PROCEDURE — 63600175 PHARM REV CODE 636 W HCPCS

## 2024-12-26 PROCEDURE — 81025 URINE PREGNANCY TEST: CPT | Performed by: NURSE PRACTITIONER

## 2024-12-26 PROCEDURE — 96365 THER/PROPH/DIAG IV INF INIT: CPT

## 2024-12-26 PROCEDURE — 99900035 HC TECH TIME PER 15 MIN (STAT)

## 2024-12-26 PROCEDURE — 96375 TX/PRO/DX INJ NEW DRUG ADDON: CPT

## 2024-12-26 PROCEDURE — 83605 ASSAY OF LACTIC ACID: CPT | Performed by: NURSE PRACTITIONER

## 2024-12-26 PROCEDURE — 81003 URINALYSIS AUTO W/O SCOPE: CPT | Performed by: NURSE PRACTITIONER

## 2024-12-26 PROCEDURE — 36415 COLL VENOUS BLD VENIPUNCTURE: CPT | Performed by: EMERGENCY MEDICINE

## 2024-12-26 PROCEDURE — 87389 HIV-1 AG W/HIV-1&-2 AB AG IA: CPT | Performed by: EMERGENCY MEDICINE

## 2024-12-26 PROCEDURE — 25000003 PHARM REV CODE 250: Performed by: NURSE PRACTITIONER

## 2024-12-26 PROCEDURE — 63600175 PHARM REV CODE 636 W HCPCS: Performed by: NURSE PRACTITIONER

## 2024-12-26 PROCEDURE — 99285 EMERGENCY DEPT VISIT HI MDM: CPT | Mod: 25

## 2024-12-26 PROCEDURE — 96366 THER/PROPH/DIAG IV INF ADDON: CPT

## 2024-12-26 PROCEDURE — 85025 COMPLETE CBC W/AUTO DIFF WBC: CPT | Performed by: NURSE PRACTITIONER

## 2024-12-26 PROCEDURE — 86803 HEPATITIS C AB TEST: CPT | Performed by: EMERGENCY MEDICINE

## 2024-12-26 PROCEDURE — 25000003 PHARM REV CODE 250

## 2024-12-26 RX ORDER — PROCHLORPERAZINE EDISYLATE 5 MG/ML
5 INJECTION INTRAMUSCULAR; INTRAVENOUS EVERY 6 HOURS PRN
Status: DISCONTINUED | OUTPATIENT
Start: 2024-12-26 | End: 2024-12-28 | Stop reason: HOSPADM

## 2024-12-26 RX ORDER — IBUPROFEN 200 MG
24 TABLET ORAL
Status: DISCONTINUED | OUTPATIENT
Start: 2024-12-26 | End: 2024-12-28 | Stop reason: HOSPADM

## 2024-12-26 RX ORDER — IBUPROFEN 200 MG
16 TABLET ORAL
Status: DISCONTINUED | OUTPATIENT
Start: 2024-12-26 | End: 2024-12-28 | Stop reason: HOSPADM

## 2024-12-26 RX ORDER — HYDROMORPHONE HYDROCHLORIDE 1 MG/ML
1 INJECTION, SOLUTION INTRAMUSCULAR; INTRAVENOUS; SUBCUTANEOUS EVERY 6 HOURS PRN
Status: DISCONTINUED | OUTPATIENT
Start: 2024-12-26 | End: 2024-12-28 | Stop reason: HOSPADM

## 2024-12-26 RX ORDER — IPRATROPIUM BROMIDE AND ALBUTEROL SULFATE 2.5; .5 MG/3ML; MG/3ML
3 SOLUTION RESPIRATORY (INHALATION) EVERY 6 HOURS PRN
Status: DISCONTINUED | OUTPATIENT
Start: 2024-12-26 | End: 2024-12-28 | Stop reason: HOSPADM

## 2024-12-26 RX ORDER — IBUPROFEN 200 MG
1 TABLET ORAL DAILY PRN
Status: DISCONTINUED | OUTPATIENT
Start: 2024-12-26 | End: 2024-12-28 | Stop reason: HOSPADM

## 2024-12-26 RX ORDER — GLUCAGON 1 MG
1 KIT INJECTION
Status: DISCONTINUED | OUTPATIENT
Start: 2024-12-26 | End: 2024-12-28 | Stop reason: HOSPADM

## 2024-12-26 RX ORDER — SODIUM CHLORIDE 0.9 % (FLUSH) 0.9 %
10 SYRINGE (ML) INJECTION EVERY 12 HOURS PRN
Status: DISCONTINUED | OUTPATIENT
Start: 2024-12-26 | End: 2024-12-28 | Stop reason: HOSPADM

## 2024-12-26 RX ORDER — NALOXONE HCL 0.4 MG/ML
0.02 VIAL (ML) INJECTION
Status: DISCONTINUED | OUTPATIENT
Start: 2024-12-26 | End: 2024-12-28 | Stop reason: HOSPADM

## 2024-12-26 RX ORDER — KETOROLAC TROMETHAMINE 30 MG/ML
10 INJECTION, SOLUTION INTRAMUSCULAR; INTRAVENOUS
Status: COMPLETED | OUTPATIENT
Start: 2024-12-26 | End: 2024-12-26

## 2024-12-26 RX ORDER — KETOROLAC TROMETHAMINE 30 MG/ML
15 INJECTION, SOLUTION INTRAMUSCULAR; INTRAVENOUS EVERY 6 HOURS PRN
Status: DISPENSED | OUTPATIENT
Start: 2024-12-26 | End: 2024-12-27

## 2024-12-26 RX ORDER — MORPHINE SULFATE 4 MG/ML
4 INJECTION, SOLUTION INTRAMUSCULAR; INTRAVENOUS
Status: COMPLETED | OUTPATIENT
Start: 2024-12-26 | End: 2024-12-26

## 2024-12-26 RX ORDER — ONDANSETRON HYDROCHLORIDE 2 MG/ML
4 INJECTION, SOLUTION INTRAVENOUS
Status: COMPLETED | OUTPATIENT
Start: 2024-12-26 | End: 2024-12-26

## 2024-12-26 RX ORDER — SODIUM CHLORIDE 9 MG/ML
1000 INJECTION, SOLUTION INTRAVENOUS
Status: COMPLETED | OUTPATIENT
Start: 2024-12-26 | End: 2024-12-26

## 2024-12-26 RX ORDER — PANTOPRAZOLE SODIUM 40 MG/10ML
40 INJECTION, POWDER, LYOPHILIZED, FOR SOLUTION INTRAVENOUS DAILY
Status: DISCONTINUED | OUTPATIENT
Start: 2024-12-26 | End: 2024-12-28 | Stop reason: HOSPADM

## 2024-12-26 RX ORDER — HYDROMORPHONE HYDROCHLORIDE 1 MG/ML
0.5 INJECTION, SOLUTION INTRAMUSCULAR; INTRAVENOUS; SUBCUTANEOUS EVERY 6 HOURS PRN
Status: DISCONTINUED | OUTPATIENT
Start: 2024-12-26 | End: 2024-12-28 | Stop reason: HOSPADM

## 2024-12-26 RX ORDER — SODIUM CHLORIDE, SODIUM LACTATE, POTASSIUM CHLORIDE, CALCIUM CHLORIDE 600; 310; 30; 20 MG/100ML; MG/100ML; MG/100ML; MG/100ML
INJECTION, SOLUTION INTRAVENOUS CONTINUOUS
Status: DISCONTINUED | OUTPATIENT
Start: 2024-12-26 | End: 2024-12-28 | Stop reason: HOSPADM

## 2024-12-26 RX ORDER — ONDANSETRON HYDROCHLORIDE 2 MG/ML
4 INJECTION, SOLUTION INTRAVENOUS EVERY 8 HOURS PRN
Status: DISCONTINUED | OUTPATIENT
Start: 2024-12-26 | End: 2024-12-28 | Stop reason: HOSPADM

## 2024-12-26 RX ADMIN — KETOROLAC TROMETHAMINE 15 MG: 30 INJECTION, SOLUTION INTRAMUSCULAR; INTRAVENOUS at 09:12

## 2024-12-26 RX ADMIN — ONDANSETRON 4 MG: 2 INJECTION INTRAMUSCULAR; INTRAVENOUS at 12:12

## 2024-12-26 RX ADMIN — PANTOPRAZOLE SODIUM 40 MG: 40 INJECTION, POWDER, LYOPHILIZED, FOR SOLUTION INTRAVENOUS at 03:12

## 2024-12-26 RX ADMIN — IOHEXOL 100 ML: 350 INJECTION, SOLUTION INTRAVENOUS at 12:12

## 2024-12-26 RX ADMIN — KETOROLAC TROMETHAMINE 10 MG: 30 INJECTION, SOLUTION INTRAMUSCULAR; INTRAVENOUS at 03:12

## 2024-12-26 RX ADMIN — SODIUM CHLORIDE, POTASSIUM CHLORIDE, SODIUM LACTATE AND CALCIUM CHLORIDE: 600; 310; 30; 20 INJECTION, SOLUTION INTRAVENOUS at 03:12

## 2024-12-26 RX ADMIN — SODIUM CHLORIDE 1000 ML: 9 INJECTION, SOLUTION INTRAVENOUS at 01:12

## 2024-12-26 RX ADMIN — PIPERACILLIN SODIUM AND TAZOBACTAM SODIUM 4.5 G: 4; .5 INJECTION, POWDER, LYOPHILIZED, FOR SOLUTION INTRAVENOUS at 11:12

## 2024-12-26 RX ADMIN — MORPHINE SULFATE 4 MG: 4 INJECTION INTRAVENOUS at 12:12

## 2024-12-26 RX ADMIN — PIPERACILLIN SODIUM AND TAZOBACTAM SODIUM 4.5 G: 4; .5 INJECTION, POWDER, LYOPHILIZED, FOR SOLUTION INTRAVENOUS at 03:12

## 2024-12-26 NOTE — ASSESSMENT & PLAN NOTE
Completed outpatient oral antibiotics and has intractable abdominal pain.    -IV zosyn  -PRN IV analgesia  -PRN IV toradol for mild pain  -PRN IV anti-emetics  -NPO except ice chips  -LR 100ml/hour

## 2024-12-26 NOTE — ED PROVIDER NOTES
"Encounter Date: 2024       History     Chief Complaint   Patient presents with    Abdominal Pain     Beginning yesterday, "pressure and burning"; Hx diverticulitis, completed treatment 2 weeks ago       Patient is a 34 y.o. female who presents to the ED 2024 with a chief complaint of right upper and Lower quadrant abdominal pain for approximately 2 weeks.  Patient was recently admitted for diverticulitis and was sent home on antibiotics for 7 days.  She finished those 2 days ago but a proximally 3 days the pain started worsening again worse than it was initially.  Comes in today for persistent pain.  She has been taking a mostly liquid diet with some nausea without vomiting and she denies any diarrhea or fever.  She denies pregnancy.  She has a history of .  She denies any other abdominal surgeries.  She denies any flank pain or dysuria or pelvic pain or discharge.      Review of patient's allergies indicates:  No Known Allergies  Past Medical History:   Diagnosis Date    Diverticulosis     No pertinent past medical history      Past Surgical History:   Procedure Laterality Date     SECTION      SKIN GRAFT      UMBILICAL HERNIA REPAIR N/A 2022    Procedure: REPAIR, HERNIA, UMBILICAL, AGE 5 YEARS OR OLDER;  Surgeon: Shahram Henderson III, MD;  Location: [facility];  Service: General;  Laterality: N/A;     Family History   Problem Relation Name Age of Onset    Diabetes Mother      Hypertension Mother       Social History     Tobacco Use    Smoking status: Every Day     Current packs/day: 0.25     Types: Cigarettes    Smokeless tobacco: Never   Substance Use Topics    Alcohol use: Yes     Comment: occ    Drug use: Never     Review of Systems   Constitutional:  Negative for chills and fever.   HENT:  Negative for sore throat.    Respiratory:  Negative for chest tightness and shortness of breath.    Cardiovascular:  Negative for chest pain.   Gastrointestinal:  Positive for abdominal pain " and nausea. Negative for constipation, diarrhea and vomiting.   Genitourinary:  Negative for dysuria.   Musculoskeletal:  Negative for arthralgias and myalgias.   Skin:  Negative for rash and wound.   Neurological:  Negative for syncope.   Hematological:  Does not bruise/bleed easily.       Physical Exam     Initial Vitals [12/26/24 1120]   BP Pulse Resp Temp SpO2   (!) 143/91 110 20 98.2 °F (36.8 °C) 99 %      MAP       --         Physical Exam    Nursing note and vitals reviewed.  Constitutional: Vital signs are normal. She appears well-developed and well-nourished.   HENT:   Head: Normocephalic and atraumatic.   Eyes: Pupils are equal, round, and reactive to light.   Neck: Neck supple.   Cardiovascular:  Normal rate, regular rhythm, normal heart sounds and intact distal pulses.     Exam reveals no gallop and no friction rub.       No murmur heard.  Pulmonary/Chest: Breath sounds normal. She has no wheezes. She has no rhonchi. She has no rales.   Abdominal: Abdomen is soft. Bowel sounds are normal. There is abdominal tenderness in the right upper quadrant, right lower quadrant and suprapubic area.   No right CVA tenderness.  No left CVA tenderness.   Musculoskeletal:      Cervical back: Neck supple.     Neurological: She is alert and oriented to person, place, and time. She has normal strength.   Skin: Skin is warm, dry and intact.   Psychiatric: She has a normal mood and affect. Her speech is normal and behavior is normal.         ED Course   Procedures  Labs Reviewed   CBC W/ AUTO DIFFERENTIAL - Abnormal       Result Value    WBC 5.02      RBC 3.62 (*)     Hemoglobin 12.2      Hematocrit 36.0 (*)     MCV 99 (*)     MCH 33.7 (*)     MCHC 33.9      RDW 11.7      Platelets 293      MPV 8.8 (*)     Immature Granulocytes 0.6 (*)     Gran # (ANC) 3.1      Immature Grans (Abs) 0.03      Lymph # 1.6      Mono # 0.2 (*)     Eos # 0.1      Baso # 0.03      nRBC 0      Gran % 61.3      Lymph % 31.1      Mono % 4.6       Eosinophil % 1.8      Basophil % 0.6      Platelet Estimate Appears normal      Differential Method Automated     COMPREHENSIVE METABOLIC PANEL - Abnormal    Sodium 138      Potassium 3.6      Chloride 109      CO2 19 (*)     Glucose 92      BUN 10      Creatinine 0.8      Calcium 8.5 (*)     Total Protein 6.9      Albumin 3.6      Total Bilirubin 0.4      Alkaline Phosphatase 48      AST 15      ALT 13      eGFR >60      Anion Gap 10     URINALYSIS, REFLEX TO URINE CULTURE - Abnormal    Specimen UA Urine, Clean Catch      Color, UA Yellow      Appearance, UA Clear      pH, UA 6.0      Specific Gravity, UA >1.030 (*)     Protein, UA Trace (*)     Glucose, UA Negative      Ketones, UA Negative      Bilirubin (UA) Negative      Occult Blood UA Negative      Nitrite, UA Negative      Urobilinogen, UA Negative      Leukocytes, UA Negative      Narrative:     Specimen Source->Urine   CLOSTRIDIUM DIFFICILE   HEPATITIS C ANTIBODY    Hepatitis C Ab Negative      Narrative:     Release to patient->Immediate   HIV 1 / 2 ANTIBODY    HIV 1/2 Ag/Ab Negative      Narrative:     Release to patient->Immediate   LIPASE    Lipase 8     LACTIC ACID, PLASMA    Lactate (Lactic Acid) 0.5     POCT URINE PREGNANCY    POC Preg Test, Ur Negative       Acceptable Yes            Imaging Results              CT Abdomen Pelvis With IV Contrast NO Oral Contrast (Final result)  Result time 12/26/24 12:52:37      Final result by Gildardo Tran MD (12/26/24 12:52:37)                   Impression:      Acute sigmoid diverticulitis with moderate improvement.  No evidence for perforation or abscess.  No new abnormality.      Electronically signed by: Gildardo Tran MD  Date:    12/26/2024  Time:    12:52               Narrative:    EXAMINATION:  CT ABDOMEN PELVIS WITH IV CONTRAST    CLINICAL HISTORY:  RLQ abdominal pain (Age >= 14y);    TECHNIQUE:  Low dose axial images, sagittal and coronal reformations were obtained from the  lung bases to the pubic symphysis following the IV administration of 100 mL of Omnipaque 350 .  Oral contrast was not given.    COMPARISON:  12/15/2024    FINDINGS:  The liver, spleen, pancreas, kidneys and adrenal glands are unremarkable.    The bowel is nondilated.  The appendix is normal.    Moderate sigmoid predominant diverticulosis is present.  There is a thickened segment of proximal sigmoid colon, accompanied by mild pericolonic fat stranding, in keeping with acute diverticulitis.  There has been moderate improvement in the degree of inflammatory change.  There is no free air or fluid collection.    No significant bony abnormality.                                       Medications   iohexoL (OMNIPAQUE 350) 350 mg iodine/mL injection (has no administration in time range)   sodium chloride 0.9% flush 10 mL (has no administration in time range)   naloxone 0.4 mg/mL injection 0.02 mg (has no administration in time range)   glucose chewable tablet 16 g (has no administration in time range)   glucose chewable tablet 24 g (has no administration in time range)   glucagon (human recombinant) injection 1 mg (has no administration in time range)   ketorolac injection 15 mg (has no administration in time range)   HYDROmorphone injection 0.5 mg (has no administration in time range)   HYDROmorphone injection 1 mg (has no administration in time range)   ondansetron injection 4 mg (has no administration in time range)   prochlorperazine injection Soln 5 mg (has no administration in time range)   albuterol-ipratropium 2.5 mg-0.5 mg/3 mL nebulizer solution 3 mL (has no administration in time range)   pantoprazole injection 40 mg (40 mg Intravenous Given 12/26/24 1523)   piperacillin-tazobactam (ZOSYN) 4.5 g in D5W 100 mL IVPB (MB+) (has no administration in time range)   lactated ringers infusion ( Intravenous New Bag 12/26/24 1524)   nicotine 21 mg/24 hr 1 patch (has no administration in time range)   dextrose 10% bolus 125  mL 125 mL (has no administration in time range)   dextrose 10% bolus 250 mL 250 mL (has no administration in time range)   ondansetron injection 4 mg (4 mg Intravenous Given 12/26/24 1256)   morphine injection 4 mg (4 mg Intravenous Given 12/26/24 1255)   iohexoL (OMNIPAQUE 350) injection 100 mL (100 mLs Intravenous Given 12/26/24 1230)   0.9% NaCl infusion (0 mLs Intravenous Stopped 12/26/24 1455)   piperacillin-tazobactam (ZOSYN) 4.5 g in D5W 100 mL IVPB (MB+) (0 g Intravenous Stopped 12/26/24 1622)   ketorolac injection 9.999 mg (9.999 mg Intravenous Given 12/26/24 1554)     Medical Decision Making  Amount and/or Complexity of Data Reviewed  Labs: ordered.  Radiology: ordered.    Risk  Prescription drug management.         APC / Resident Notes:   Patient is a 34 y.o. female who presents to the ED 12/26/2024 Who underwent emergent evaluation for abdominal pain with recent admission for diverticulitis.  She has completed antibiotics.  Vital signs normal.  She is not febrile.  No leukocytosis.  She has not appear to be septic.  CT without evidence of abscess or perforation.  There is evidence of someone improving diverticulitis however clinically patient has worsening pain and symptoms.  Patient still in significant pain after morphine in the emergency department and tearful.  She declines further narcotic pain medication as she does not like the side effects of these medications and is offered Toradol and admission given worsening pain despite treatment at home.  I do not think emergent consultation with surgery indicated however will admit for IV antibiotics and pain control and serial abdominal exams.  Patient agreeable to this plan of care and case is discussed with hospital medicine team Serina nurse practitioner who is accepting of this admission.  Case also discussed with Dr. Rosario who is agreeable to plan of care.                    Medical Decision Making:   Differential Diagnosis:    Diverticulitis  Perforation  Abscess             Clinical Impression:  Final diagnoses:  [K57.92] Diverticulitis (Primary)          ED Disposition Condition    Observation Stable                Charu Meza NP  12/26/24 1534       Charu Meza NP  12/26/24 3112

## 2024-12-26 NOTE — ASSESSMENT & PLAN NOTE
Dangers of cigarette smoking were reviewed with patient in detail. Patient was Counseled for 3-10 minutes. Nicotine replacement options were discussed. Nicotine replacement was discussed-  prescribed PRN while in hospital

## 2024-12-26 NOTE — PLAN OF CARE
Dc assessment completed. Pt states she lives with her SO, she drives herself, and denies hh hd dm dme o2 neb and bt. She states her SO Lauro will be bringing her home at [place]. Confirms demographics    [facility] ED  Initial Discharge Assessment       Primary Care Provider: Vickie, Primary Doctor    Admission Diagnosis: Diverticulitis [K57.92]    Admission Date: 12/26/2024  Expected Discharge Date:     Transition of Care Barriers: None    Payor: MEDICAID / Plan: [place] [facility] CONNECT / Product Type: Managed Medicaid /     Extended Emergency Contact Information  Primary Emergency Contact: Lauro [place] [address] [place]  Mobile Phone: [phone]  Relation: Significant other   needed? No  Secondary Emergency Contact: Endy Elizondo           [facility]  Mobile Phone: [phone]  Relation: Brother    Discharge Plan A: Home  Discharge Plan B: Home      [id number] - [place] - [address] [facility] [address]  [address] [address]  Phone: [phone] Fax: [phone]      Initial Assessment (most recent)       Adult Discharge Assessment - 12/26/24 1538          Discharge Assessment    Assessment Type Discharge Planning Assessment     Confirmed/corrected address, phone number and insurance Yes     Confirmed Demographics Correct on Facesheet     Source of Information patient     Communicated EDWARD with patient/caregiver Date not available/Unable to determine     Reason For Admission diverticulitis     People in Home significant other     Do you expect to return to your current living situation? Yes     Do you have help at home or someone to help you manage your care at home? Yes     Who are your caregiver(s) and their phone number(s)? PATRICE Restrepo     Prior to hospitilization cognitive status: Alert/Oriented     Current cognitive status: Alert/Oriented      Equipment Currently Used at Home none     Readmission within 30 days? No     Patient currently being followed by outpatient case management? No     Do you currently have service(s) that help you manage your care at home? No     Do you take prescription medications? Yes     Do you have prescription coverage? Yes     Coverage mcaid     Do you have any problems affording any of your prescribed medications? No     Is the patient taking medications as prescribed? yes     Who is going to help you get home at discharge? SO     How do you get to doctors appointments? car, drives self;family or friend will provide     Are you on dialysis? No     Do you take coumadin? No     Discharge Plan A Home     Discharge Plan B Home     DME Needed Upon Discharge  none     Discharge Plan discussed with: Patient     Transition of Care Barriers None

## 2024-12-27 LAB
ALBUMIN SERPL BCP-MCNC: 3.1 G/DL (ref 3.5–5.2)
ALP SERPL-CCNC: 42 U/L (ref 40–150)
ALT SERPL W/O P-5'-P-CCNC: 11 U/L (ref 10–44)
ANION GAP SERPL CALC-SCNC: 6 MMOL/L (ref 8–16)
AST SERPL-CCNC: 13 U/L (ref 10–40)
BILIRUB SERPL-MCNC: 0.3 MG/DL (ref 0.1–1)
BUN SERPL-MCNC: 6 MG/DL (ref 6–20)
CALCIUM SERPL-MCNC: 8.4 MG/DL (ref 8.7–10.5)
CHLORIDE SERPL-SCNC: 113 MMOL/L (ref 95–110)
CO2 SERPL-SCNC: 21 MMOL/L (ref 23–29)
CREAT SERPL-MCNC: 0.7 MG/DL (ref 0.5–1.4)
ERYTHROCYTE [DISTWIDTH] IN BLOOD BY AUTOMATED COUNT: 11.8 % (ref 11.5–14.5)
EST. GFR  (NO RACE VARIABLE): >60 ML/MIN/1.73 M^2
GLUCOSE SERPL-MCNC: 95 MG/DL (ref 70–110)
HCT VFR BLD AUTO: 35.4 % (ref 37–48.5)
HGB BLD-MCNC: 11.5 G/DL (ref 12–16)
MAGNESIUM SERPL-MCNC: 2 MG/DL (ref 1.6–2.6)
MCH RBC QN AUTO: 32.7 PG (ref 27–31)
MCHC RBC AUTO-ENTMCNC: 32.5 G/DL (ref 32–36)
MCV RBC AUTO: 101 FL (ref 82–98)
PLATELET # BLD AUTO: 264 K/UL (ref 150–450)
PMV BLD AUTO: 8.7 FL (ref 9.2–12.9)
POTASSIUM SERPL-SCNC: 4 MMOL/L (ref 3.5–5.1)
PROT SERPL-MCNC: 6 G/DL (ref 6–8.4)
RBC # BLD AUTO: 3.52 M/UL (ref 4–5.4)
SODIUM SERPL-SCNC: 140 MMOL/L (ref 136–145)
WBC # BLD AUTO: 3.98 K/UL (ref 3.9–12.7)

## 2024-12-27 PROCEDURE — 99900035 HC TECH TIME PER 15 MIN (STAT)

## 2024-12-27 PROCEDURE — 36415 COLL VENOUS BLD VENIPUNCTURE: CPT

## 2024-12-27 PROCEDURE — 85027 COMPLETE CBC AUTOMATED: CPT

## 2024-12-27 PROCEDURE — 94760 N-INVAS EAR/PLS OXIMETRY 1: CPT

## 2024-12-27 PROCEDURE — 63600175 PHARM REV CODE 636 W HCPCS

## 2024-12-27 PROCEDURE — 96365 THER/PROPH/DIAG IV INF INIT: CPT | Mod: 59

## 2024-12-27 PROCEDURE — 83735 ASSAY OF MAGNESIUM: CPT

## 2024-12-27 PROCEDURE — 99406 BEHAV CHNG SMOKING 3-10 MIN: CPT

## 2024-12-27 PROCEDURE — 96361 HYDRATE IV INFUSION ADD-ON: CPT

## 2024-12-27 PROCEDURE — 25000003 PHARM REV CODE 250

## 2024-12-27 PROCEDURE — 96366 THER/PROPH/DIAG IV INF ADDON: CPT

## 2024-12-27 PROCEDURE — 80053 COMPREHEN METABOLIC PANEL: CPT

## 2024-12-27 PROCEDURE — 96375 TX/PRO/DX INJ NEW DRUG ADDON: CPT

## 2024-12-27 PROCEDURE — 94799 UNLISTED PULMONARY SVC/PX: CPT

## 2024-12-27 PROCEDURE — G0378 HOSPITAL OBSERVATION PER HR: HCPCS

## 2024-12-27 PROCEDURE — 94761 N-INVAS EAR/PLS OXIMETRY MLT: CPT

## 2024-12-27 RX ADMIN — PIPERACILLIN SODIUM AND TAZOBACTAM SODIUM 4.5 G: 4; .5 INJECTION, POWDER, LYOPHILIZED, FOR SOLUTION INTRAVENOUS at 08:12

## 2024-12-27 RX ADMIN — KETOROLAC TROMETHAMINE 15 MG: 30 INJECTION, SOLUTION INTRAMUSCULAR; INTRAVENOUS at 08:12

## 2024-12-27 RX ADMIN — SODIUM CHLORIDE, POTASSIUM CHLORIDE, SODIUM LACTATE AND CALCIUM CHLORIDE: 600; 310; 30; 20 INJECTION, SOLUTION INTRAVENOUS at 01:12

## 2024-12-27 RX ADMIN — PIPERACILLIN SODIUM AND TAZOBACTAM SODIUM 4.5 G: 4; .5 INJECTION, POWDER, LYOPHILIZED, FOR SOLUTION INTRAVENOUS at 03:12

## 2024-12-27 RX ADMIN — PANTOPRAZOLE SODIUM 40 MG: 40 INJECTION, POWDER, LYOPHILIZED, FOR SOLUTION INTRAVENOUS at 08:12

## 2024-12-27 RX ADMIN — SODIUM CHLORIDE, POTASSIUM CHLORIDE, SODIUM LACTATE AND CALCIUM CHLORIDE: 600; 310; 30; 20 INJECTION, SOLUTION INTRAVENOUS at 03:12

## 2024-12-27 RX ADMIN — PIPERACILLIN SODIUM AND TAZOBACTAM SODIUM 4.5 G: 4; .5 INJECTION, POWDER, LYOPHILIZED, FOR SOLUTION INTRAVENOUS at 11:12

## 2024-12-27 RX ADMIN — SODIUM CHLORIDE, POTASSIUM CHLORIDE, SODIUM LACTATE AND CALCIUM CHLORIDE: 600; 310; 30; 20 INJECTION, SOLUTION INTRAVENOUS at 11:12

## 2024-12-27 NOTE — SUBJECTIVE & OBJECTIVE
Past Medical History:   Diagnosis Date    Diverticulosis     No pertinent past medical history        Past Surgical History:   Procedure Laterality Date     SECTION      SKIN GRAFT      UMBILICAL HERNIA REPAIR N/A 2022    Procedure: REPAIR, HERNIA, UMBILICAL, AGE 3 YEARS OR OLDER;  Surgeon: Shahram Henderson III, MD;  Location: [facility];  Service: General;  Laterality: N/A;       Review of patient's allergies indicates:  No Known Allergies    No current facility-administered medications on file prior to encounter.     Current Outpatient Medications on File Prior to Encounter   Medication Sig    azelastine (ASTELIN) 137 mcg (0.1 %) nasal spray 1 spray by Nasal route 2 (two) times daily.    diclofenac (VOLTAREN) 50 MG EC tablet Take 1 tablet (50 mg total) by mouth 3 (three) times daily as needed.    fluticasone propionate (FLONASE) 50 mcg/actuation nasal spray 2 sprays by Each Nostril route 2 (two) times daily.    multivit,calc,mins/iron/folic (ONE-A-DAY WOMENS FORMULA ORAL) Take 1 tablet by mouth once daily.    VENTOLIN HFA 90 mcg/actuation inhaler Inhale 2 puffs into the lungs every 4 (four) hours as needed.     Family History       Problem Relation (Age of Onset)    Diabetes Mother    Hypertension Mother          Tobacco Use    Smoking status: Every Day     Current packs/day: 0.25     Types: Cigarettes    Smokeless tobacco: Never   Substance and Sexual Activity    Alcohol use: Yes     Comment: occ    Drug use: Never    Sexual activity: Yes     Partners: Male     Comment: Depot shot     Review of Systems   Gastrointestinal:  Positive for abdominal pain.   All other systems reviewed and are negative.    Objective:     Vital Signs (Most Recent):  Temp: 98.3 °F (36.8 °C) (24)  Pulse: 79 (24)  Resp: 18 (24)  BP: (!) 105/59 (24)  SpO2: 98 % (24) Vital Signs (24h Range):  Temp:  [97.9 °F (36.6 °C)-98.3 °F (36.8 °C)] 98.3 °F (36.8 °C)  Pulse:  []  79  Resp:  [15-20] 18  SpO2:  [98 %-100 %] 98 %  BP: (105-143)/(59-91) 105/59     Weight: 93.9 kg (207 lb)  Body mass index is 33.41 kg/m².     Physical Exam  Vitals reviewed.   Constitutional:       Appearance: Normal appearance.   HENT:      Head: Normocephalic and atraumatic.      Nose: Nose normal.      Mouth/Throat:      Pharynx: Oropharynx is clear.   Eyes:      Conjunctiva/sclera: Conjunctivae normal.   Cardiovascular:      Rate and Rhythm: Normal rate and regular rhythm.      Pulses: Normal pulses.      Heart sounds: Normal heart sounds.   Pulmonary:      Effort: Pulmonary effort is normal.      Breath sounds: Normal breath sounds.   Abdominal:      General: Bowel sounds are normal.      Palpations: Abdomen is soft.      Tenderness: There is abdominal tenderness in the right upper quadrant, epigastric area and periumbilical area.   Musculoskeletal:         General: Normal range of motion.      Cervical back: Normal range of motion.   Skin:     General: Skin is warm and dry.      Capillary Refill: Capillary refill takes less than 2 seconds.   Neurological:      General: No focal deficit present.      Mental Status: She is alert and oriented to person, place, and time. Mental status is at baseline.   Psychiatric:         Mood and Affect: Mood normal.         Behavior: Behavior normal.         Thought Content: Thought content normal.         Judgment: Judgment normal.                Significant Labs: All pertinent labs within the past 24 hours have been reviewed.    Bilirubin:   Recent Labs   Lab 12/15/24  0010 12/15/24  0549 12/16/24  0439 12/26/24  1210   BILITOT 0.2 0.6 0.3 0.4       BMP:   Recent Labs   Lab 12/26/24  1210   GLU 92      K 3.6      CO2 19*   BUN 10   CREATININE 0.8   CALCIUM 8.5*     CBC:   Recent Labs   Lab 12/26/24  1210   WBC 5.02   HGB 12.2   HCT 36.0*        CMP:   Recent Labs   Lab 12/26/24  1210      K 3.6      CO2 19*   GLU 92   BUN 10   CREATININE 0.8    CALCIUM 8.5*   PROT 6.9   ALBUMIN 3.6   BILITOT 0.4   ALKPHOS 48   AST 15   ALT 13   ANIONGAP 10     Lactic Acid:   Recent Labs   Lab 12/26/24  1210   LACTATE 0.5     Lipase:   Recent Labs   Lab 12/26/24  1210   LIPASE 8       Urine Studies:   Recent Labs   Lab 12/26/24  1455   COLORU Yellow   APPEARANCEUA Clear   PHUR 6.0   SPECGRAV >1.030*   PROTEINUA Trace*   GLUCUA Negative   KETONESU Negative   BILIRUBINUA Negative   OCCULTUA Negative   NITRITE Negative   UROBILINOGEN Negative   LEUKOCYTESUR Negative       Significant Imaging: I have reviewed all pertinent imaging results/findings within the past 24 hours.  EXAMINATION:  CT ABDOMEN PELVIS WITH IV CONTRAST     CLINICAL HISTORY:  RLQ abdominal pain (Age >= 14y);     TECHNIQUE:  Low dose axial images, sagittal and coronal reformations were obtained from the lung bases to the pubic symphysis following the IV administration of 100 mL of Omnipaque 350 .  Oral contrast was not given.     COMPARISON:  12/15/2024     FINDINGS:  The liver, spleen, pancreas, kidneys and adrenal glands are unremarkable.     The bowel is nondilated.  The appendix is normal.     Moderate sigmoid predominant diverticulosis is present.  There is a thickened segment of proximal sigmoid colon, accompanied by mild pericolonic fat stranding, in keeping with acute diverticulitis.  There has been moderate improvement in the degree of inflammatory change.  There is no free air or fluid collection.     No significant bony abnormality.     Impression:     Acute sigmoid diverticulitis with moderate improvement.  No evidence for perforation or abscess.  No new abnormality.        Electronically signed by:Gildardo Tran MD  Date:                                            12/26/2024  Time:                                           12:52

## 2024-12-27 NOTE — HPI
Vanessa Guerra is a 34 year old female with a previous medical history of diverticulitis, and  who presented to the ED for abdominal pain. She was discharged on  with one week of cipro and flagyl which were completed two days ago. She has been adherent to a clear liquid/bland diet and small portions. The abdominal pain did subside but returned approximately two days ago and is reported as more intense than initial presentation. Initial ED evaluation included a CT scan that showed moderate improvement in diverticulitis. CBC and CMP unremarkable and lactic acid normal. Urine studies negative for infection or occult blood. Patient admitted by hospital medicine for further evaluation and management.

## 2024-12-27 NOTE — PROGRESS NOTES
[facility] Medicine  Progress Note    Patient Name: Vanessa Guerra  MRN: [id number]  Patient Class: OP- Observation   Admission Date: 2024  Length of Stay: 0 days  Attending Physician: Belkys Lanier MD  Primary Care Provider: Vickie, Primary Doctor        Subjective     Principal Problem:Diverticulitis        HPI:  Vanessa Guerra is a 34 year old female with a previous medical history of diverticulitis, and  who presented to the ED for abdominal pain. She was discharged on  with one week of cipro and flagyl which were completed two days ago. She has been adherent to a clear liquid/bland diet and small portions. The abdominal pain did subside but returned approximately two days ago and is reported as more intense than initial presentation. Initial ED evaluation included a CT scan that showed moderate improvement in diverticulitis. CBC and CMP unremarkable and lactic acid normal. Urine studies negative for infection or occult blood. Patient admitted by hospital medicine for further evaluation and management.     Overview/Hospital Course:  No notes on file    Interval History:  Patient seen and examined.  Reports tolerating clears but has little appetite.  Advance to low residue diet.  Patient reports abdominal pain has improved from yesterday.  Denies diarrhea.    Review of Systems   Constitutional:  Positive for activity change and appetite change.   Gastrointestinal:  Negative for abdominal pain, nausea and vomiting.     Objective:     Vital Signs (Most Recent):  Temp: 98.1 °F (36.7 °C) (24 1138)  Pulse: 72 (24 1138)  Resp: 15 (24 1138)  BP: 122/77 (24 1138)  SpO2: 98 % (24 1138) Vital Signs (24h Range):  Temp:  [97.7 °F (36.5 °C)-98.9 °F (37.2 °C)] 98.1 °F (36.7 °C)  Pulse:  [68-97] 72  Resp:  [15-18] 15  SpO2:  [98 %-100 %] 98 %  BP: (100-124)/(59-82) 122/77     Weight: 93.9 kg (207 lb)  Body mass index is 33.41  kg/m².    Intake/Output Summary (Last 24 hours) at 12/27/2024 1426  Last data filed at 12/26/2024 2352  Gross per 24 hour   Intake 1220 ml   Output --   Net 1220 ml         Physical Exam  Constitutional:       General: She is not in acute distress.     Appearance: She is well-developed.   HENT:      Head: Normocephalic and atraumatic.   Eyes:      Pupils: Pupils are equal, round, and reactive to light.   Cardiovascular:      Rate and Rhythm: Normal rate and regular rhythm.      Heart sounds: No murmur heard.  Pulmonary:      Effort: Pulmonary effort is normal. No respiratory distress.      Breath sounds: Normal breath sounds. No wheezing or rales.   Abdominal:      General: Bowel sounds are normal. There is no distension.      Palpations: Abdomen is soft.      Tenderness: There is no abdominal tenderness.   Musculoskeletal:         General: Normal range of motion.   Skin:     General: Skin is warm and dry.      Findings: No rash.   Neurological:      Mental Status: She is alert and oriented to person, place, and time.      Cranial Nerves: No cranial nerve deficit.   Psychiatric:         Behavior: Behavior normal.             Significant Labs: All pertinent labs within the past 24 hours have been reviewed.    Significant Imaging: I have reviewed all pertinent imaging results/findings within the past 24 hours.    Assessment and Plan     * Diverticulitis  Pain has improved.  Continue monitoring on IV antibiotics for now.  Advance diet.  Possible discharge home tomorrow    -IV zosyn  -PRN IV analgesia  -PRN IV toradol for mild pain  -PRN IV anti-emetics  -advance diet  -LR 100ml/hour        Tobacco dependence  Dangers of cigarette smoking were reviewed with patient in detail. Patient was Counseled for 3-10 minutes. Nicotine replacement options were discussed. Nicotine replacement was discussed-  prescribed PRN while in hospital       VTE Risk Mitigation (From admission, onward)           Ordered     Reason for No  Pharmacological VTE Prophylaxis  Once        Question:  Reasons:  Answer:  Patient is Ambulatory    12/26/24 1445     IP VTE HIGH RISK PATIENT  Once         12/26/24 1445     Place sequential compression device  Until discontinued         12/26/24 1445                    Discharge Planning   EDWARD: 12/29/2024     Code Status: Full Code   Medical Readiness for Discharge Date:   Discharge Plan A: Home                        Belkys Lanier MD  Department of Hospital Medicine   [facility]/Surg

## 2024-12-27 NOTE — PLAN OF CARE
Problem: Adult Inpatient Plan of Care  Goal: Plan of Care Review  Outcome: Not Progressing  Goal: Patient-Specific Goal (Individualized)  Outcome: Not Progressing  Goal: Absence of Hospital-Acquired Illness or Injury  Outcome: Not Progressing  Goal: Optimal Comfort and Wellbeing  Outcome: Not Progressing  Goal: Readiness for Transition of Care  Outcome: Not Progressing     Problem: Infection  Goal: Absence of Infection Signs and Symptoms  Outcome: Not Progressing

## 2024-12-27 NOTE — H&P
"  [facility]  History & Physical    Patient Name: Vanessa Guerra  MRN: [id number]  Patient Class: OP- Observation  Admission Date: 2024  Attending Physician: Pasquale Aldrich MD   Primary Care Provider: Vickie, Primary Doctor         Patient information was obtained from patient, past medical records, and ER records.     Subjective:     Principal Problem:Diverticulitis    Chief Complaint:   Chief Complaint   Patient presents with    Abdominal Pain     Beginning yesterday, "pressure and burning"; Hx diverticulitis, completed treatment 2 weeks ago          HPI: Vanessa Guerra is a 34 year old female with a previous medical history of diverticulitis, and  who presented to the ED for abdominal pain. She was discharged on  with one week of cipro and flagyl which were completed two days ago. She has been adherent to a clear liquid/bland diet and small portions. The abdominal pain did subside but returned approximately two days ago and is reported as more intense than initial presentation. Initial ED evaluation included a CT scan that showed moderate improvement in diverticulitis. CBC and CMP unremarkable and lactic acid normal. Urine studies negative for infection or occult blood. Patient admitted by hospital medicine for further evaluation and management.     Past Medical History:   Diagnosis Date    Diverticulosis     No pertinent past medical history        Past Surgical History:   Procedure Laterality Date     SECTION      SKIN GRAFT      UMBILICAL HERNIA REPAIR N/A 2022    Procedure: REPAIR, HERNIA, UMBILICAL, AGE 5 YEARS OR OLDER;  Surgeon: Shahram Henderson III, MD;  Location: [facility];  Service: General;  Laterality: N/A;       Review of patient's allergies indicates:  No Known Allergies    No current facility-administered medications on file prior to encounter.     Current Outpatient Medications on File Prior to Encounter   Medication Sig    " azelastine (ASTELIN) 137 mcg (0.1 %) nasal spray 1 spray by Nasal route 2 (two) times daily.    diclofenac (VOLTAREN) 50 MG EC tablet Take 1 tablet (50 mg total) by mouth 3 (three) times daily as needed.    fluticasone propionate (FLONASE) 50 mcg/actuation nasal spray 2 sprays by Each Nostril route 2 (two) times daily.    multivit,calc,mins/iron/folic (ONE-A-DAY WOMENS FORMULA ORAL) Take 1 tablet by mouth once daily.    VENTOLIN HFA 90 mcg/actuation inhaler Inhale 2 puffs into the lungs every 4 (four) hours as needed.     Family History       Problem Relation (Age of Onset)    Diabetes Mother    Hypertension Mother          Tobacco Use    Smoking status: Every Day     Current packs/day: 0.25     Types: Cigarettes    Smokeless tobacco: Never   Substance and Sexual Activity    Alcohol use: Yes     Comment: occ    Drug use: Never    Sexual activity: Yes     Partners: Male     Comment: Dep[place] shot     Review of Systems   Gastrointestinal:  Positive for abdominal pain.   All other systems reviewed and are negative.    Objective:     Vital Signs (Most Recent):  Temp: 98.3 °F (36.8 °C) (12/26/24 1928)  Pulse: 79 (12/26/24 1928)  Resp: 18 (12/26/24 1928)  BP: (!) 105/59 (12/26/24 1928)  SpO2: 98 % (12/26/24 1928) Vital Signs (24h Range):  Temp:  [97.9 °F (36.6 °C)-98.3 °F (36.8 °C)] 98.3 °F (36.8 °C)  Pulse:  [] 79  Resp:  [15-20] 18  SpO2:  [98 %-100 %] 98 %  BP: (105-143)/(59-91) 105/59     Weight: 93.9 kg (207 lb)  Body mass index is 33.41 kg/m².     Physical Exam  Vitals reviewed.   Constitutional:       Appearance: Normal appearance.   HENT:      Head: Normocephalic and atraumatic.      Nose: Nose normal.      Mouth/Throat:      Pharynx: Oropharynx is clear.   Eyes:      Conjunctiva/sclera: Conjunctivae normal.   Cardiovascular:      Rate and Rhythm: Normal rate and regular rhythm.      Pulses: Normal pulses.      Heart sounds: Normal heart sounds.   Pulmonary:      Effort: Pulmonary effort is normal.      Breath  sounds: Normal breath sounds.   Abdominal:      General: Bowel sounds are normal.      Palpations: Abdomen is soft.   Hernia noted     Tenderness: There is abdominal tenderness in the right upper quadrant, epigastric area and periumbilical area.   Musculoskeletal:         General: Normal range of motion.      Cervical back: Normal range of motion.   Skin:     General: Skin is warm and dry.      Capillary Refill: Capillary refill takes less than 2 seconds.   Neurological:      General: No focal deficit present.      Mental Status: She is alert and oriented to person, place, and time. Mental status is at baseline.   Psychiatric:         Mood and Affect: Mood normal.         Behavior: Behavior normal.         Thought Content: Thought content normal.         Judgment: Judgment normal.                Significant Labs: All pertinent labs within the past 24 hours have been reviewed.    Bilirubin:   Recent Labs   Lab 12/15/24  0010 12/15/24  0549 12/16/24  0439 12/26/24  1210   BILITOT 0.2 0.6 0.3 0.4       BMP:   Recent Labs   Lab 12/26/24  1210   GLU 92      K 3.6      CO2 19*   BUN 10   CREATININE 0.8   CALCIUM 8.5*     CBC:   Recent Labs   Lab 12/26/24  1210   WBC 5.02   HGB 12.2   HCT 36.0*        CMP:   Recent Labs   Lab 12/26/24  1210      K 3.6      CO2 19*   GLU 92   BUN 10   CREATININE 0.8   CALCIUM 8.5*   PROT 6.9   ALBUMIN 3.6   BILITOT 0.4   ALKPHOS 48   AST 15   ALT 13   ANIONGAP 10     Lactic Acid:   Recent Labs   Lab 12/26/24  1210   LACTATE 0.5     Lipase:   Recent Labs   Lab 12/26/24  1210   LIPASE 8       Urine Studies:   Recent Labs   Lab 12/26/24  1455   COLORU Yellow   APPEARANCEUA Clear   PHUR 6.0   SPECGRAV >1.030*   PROTEINUA Trace*   GLUCUA Negative   KETONESU Negative   BILIRUBINUA Negative   OCCULTUA Negative   NITRITE Negative   UROBILINOGEN Negative   LEUKOCYTESUR Negative       Significant Imaging: I have reviewed all pertinent imaging results/findings within the  past 24 hours.  EXAMINATION:  CT ABDOMEN PELVIS WITH IV CONTRAST     CLINICAL HISTORY:  RLQ abdominal pain (Age >= 14y);     TECHNIQUE:  Low dose axial images, sagittal and coronal reformations were obtained from the lung bases to the pubic symphysis following the IV administration of 100 mL of Omnipaque 350 .  Oral contrast was not given.     COMPARISON:  12/15/2024     FINDINGS:  The liver, spleen, pancreas, kidneys and adrenal glands are unremarkable.     The bowel is nondilated.  The appendix is normal.     Moderate sigmoid predominant diverticulosis is present.  There is a thickened segment of proximal sigmoid colon, accompanied by mild pericolonic fat stranding, in keeping with acute diverticulitis.  There has been moderate improvement in the degree of inflammatory change.  There is no free air or fluid collection.     No significant bony abnormality.     Impression:     Acute sigmoid diverticulitis with moderate improvement.  No evidence for perforation or abscess.  No new abnormality.        Electronically signed by:Gildardo Tran MD  Date:                                            12/26/2024  Time:                                           12:52  Assessment/Plan:     * Diverticulitis  Completed outpatient oral antibiotics and has intractable abdominal pain.    -IV zosyn  -PRN IV analgesia  -PRN IV toradol for mild pain  -PRN IV anti-emetics  -NPO except ice chips  -LR 100ml/hour        Tobacco dependence  Dangers of cigarette smoking were reviewed with patient in detail. Patient was Counseled for 3-10 minutes. Nicotine replacement options were discussed. Nicotine replacement was discussed-  prescribed PRN while in hospital       VTE Risk Mitigation (From admission, onward)           Ordered     Reason for No Pharmacological VTE Prophylaxis  Once        Question:  Reasons:  Answer:  Patient is Ambulatory    12/26/24 1445     IP VTE HIGH RISK PATIENT  Once         12/26/24 1445     Place sequential  compression device  Until discontinued         12/26/24 1445                         On 12/26/2024, patient should be placed in hospital observation services under my care in collaboration with Dr. Aldrich.           Serina Quan NP  Department of Hospital Medicine  [facility]/Surg

## 2024-12-27 NOTE — ASSESSMENT & PLAN NOTE
Pain has improved.  Continue monitoring on IV antibiotics for now.  Advance diet.  Possible discharge home tomorrow    -IV zosyn  -PRN IV analgesia  -PRN IV toradol for mild pain  -PRN IV anti-emetics  -advance diet  -LR 100ml/hour

## 2024-12-27 NOTE — HOSPITAL COURSE
34-year-old female with recent admission for diverticulitis who completed antibiotic course 2 days ago consented to the ED with abdominal pain.  CT scan showed moderate improvement in diverticulitis.  Was initiated on Zosyn.  Pain has improved.  Advancing diet.  Patient tolerated diet well.  Abdominal pain much improved.  No diarrhea.  Patient would like to go home.  Patient will be discharged.  Patient never has a colonoscopy, I will refer patient to gastroenterologist as outpatient.  If patient has a recurrent diverticulitis again, patient's may need surgical intervention- I discussed this with the patient.  We will discharge the patient with 7 days of Augmentin.  Patient is seen at discharge they and feel appropriate for discharge.

## 2024-12-27 NOTE — SUBJECTIVE & OBJECTIVE
Interval History:  Patient seen and examined.  Reports tolerating clears but has little appetite.  Advance to low residue diet.  Patient reports abdominal pain has improved from yesterday.  Denies diarrhea.    Review of Systems   Constitutional:  Positive for activity change and appetite change.   Gastrointestinal:  Negative for abdominal pain, nausea and vomiting.     Objective:     Vital Signs (Most Recent):  Temp: 98.1 °F (36.7 °C) (12/27/24 1138)  Pulse: 72 (12/27/24 1138)  Resp: 15 (12/27/24 1138)  BP: 122/77 (12/27/24 1138)  SpO2: 98 % (12/27/24 1138) Vital Signs (24h Range):  Temp:  [97.7 °F (36.5 °C)-98.9 °F (37.2 °C)] 98.1 °F (36.7 °C)  Pulse:  [68-97] 72  Resp:  [15-18] 15  SpO2:  [98 %-100 %] 98 %  BP: (100-124)/(59-82) 122/77     Weight: 93.9 kg (207 lb)  Body mass index is 33.41 kg/m².    Intake/Output Summary (Last 24 hours) at 12/27/2024 1426  Last data filed at 12/26/2024 2352  Gross per 24 hour   Intake 1220 ml   Output --   Net 1220 ml         Physical Exam  Constitutional:       General: She is not in acute distress.     Appearance: She is well-developed.   HENT:      Head: Normocephalic and atraumatic.   Eyes:      Pupils: Pupils are equal, round, and reactive to light.   Cardiovascular:      Rate and Rhythm: Normal rate and regular rhythm.      Heart sounds: No murmur heard.  Pulmonary:      Effort: Pulmonary effort is normal. No respiratory distress.      Breath sounds: Normal breath sounds. No wheezing or rales.   Abdominal:      General: Bowel sounds are normal. There is no distension.      Palpations: Abdomen is soft.      Tenderness: There is no abdominal tenderness.   Musculoskeletal:         General: Normal range of motion.   Skin:     General: Skin is warm and dry.      Findings: No rash.   Neurological:      Mental Status: She is alert and oriented to person, place, and time.      Cranial Nerves: No cranial nerve deficit.   Psychiatric:         Behavior: Behavior normal.              Significant Labs: All pertinent labs within the past 24 hours have been reviewed.    Significant Imaging: I have reviewed all pertinent imaging results/findings within the past 24 hours.

## 2024-12-28 VITALS
SYSTOLIC BLOOD PRESSURE: 133 MMHG | RESPIRATION RATE: 18 BRPM | DIASTOLIC BLOOD PRESSURE: 87 MMHG | HEIGHT: 66 IN | WEIGHT: 207 LBS | BODY MASS INDEX: 33.27 KG/M2 | HEART RATE: 85 BPM | OXYGEN SATURATION: 99 % | TEMPERATURE: 97 F

## 2024-12-28 LAB
ALBUMIN SERPL BCP-MCNC: 2.9 G/DL (ref 3.5–5.2)
ALP SERPL-CCNC: 41 U/L (ref 40–150)
ALT SERPL W/O P-5'-P-CCNC: 12 U/L (ref 10–44)
ANION GAP SERPL CALC-SCNC: 7 MMOL/L (ref 8–16)
AST SERPL-CCNC: 16 U/L (ref 10–40)
BILIRUB SERPL-MCNC: 0.2 MG/DL (ref 0.1–1)
BUN SERPL-MCNC: 4 MG/DL (ref 6–20)
CALCIUM SERPL-MCNC: 8 MG/DL (ref 8.7–10.5)
CHLORIDE SERPL-SCNC: 113 MMOL/L (ref 95–110)
CO2 SERPL-SCNC: 21 MMOL/L (ref 23–29)
CREAT SERPL-MCNC: 0.8 MG/DL (ref 0.5–1.4)
ERYTHROCYTE [DISTWIDTH] IN BLOOD BY AUTOMATED COUNT: 11.9 % (ref 11.5–14.5)
EST. GFR  (NO RACE VARIABLE): >60 ML/MIN/1.73 M^2
GLUCOSE SERPL-MCNC: 115 MG/DL (ref 70–110)
HCT VFR BLD AUTO: 33.3 % (ref 37–48.5)
HGB BLD-MCNC: 11.1 G/DL (ref 12–16)
MAGNESIUM SERPL-MCNC: 1.9 MG/DL (ref 1.6–2.6)
MCH RBC QN AUTO: 33.7 PG (ref 27–31)
MCHC RBC AUTO-ENTMCNC: 33.3 G/DL (ref 32–36)
MCV RBC AUTO: 101 FL (ref 82–98)
PLATELET # BLD AUTO: 292 K/UL (ref 150–450)
PMV BLD AUTO: 9.3 FL (ref 9.2–12.9)
POTASSIUM SERPL-SCNC: 3.5 MMOL/L (ref 3.5–5.1)
PROT SERPL-MCNC: 5.7 G/DL (ref 6–8.4)
RBC # BLD AUTO: 3.29 M/UL (ref 4–5.4)
SODIUM SERPL-SCNC: 141 MMOL/L (ref 136–145)
WBC # BLD AUTO: 4.96 K/UL (ref 3.9–12.7)

## 2024-12-28 PROCEDURE — 94761 N-INVAS EAR/PLS OXIMETRY MLT: CPT

## 2024-12-28 PROCEDURE — 63600175 PHARM REV CODE 636 W HCPCS

## 2024-12-28 PROCEDURE — 96366 THER/PROPH/DIAG IV INF ADDON: CPT

## 2024-12-28 PROCEDURE — 80053 COMPREHEN METABOLIC PANEL: CPT

## 2024-12-28 PROCEDURE — 83735 ASSAY OF MAGNESIUM: CPT

## 2024-12-28 PROCEDURE — 96376 TX/PRO/DX INJ SAME DRUG ADON: CPT

## 2024-12-28 PROCEDURE — 85027 COMPLETE CBC AUTOMATED: CPT

## 2024-12-28 PROCEDURE — 99900035 HC TECH TIME PER 15 MIN (STAT)

## 2024-12-28 PROCEDURE — 96361 HYDRATE IV INFUSION ADD-ON: CPT

## 2024-12-28 PROCEDURE — 25000003 PHARM REV CODE 250

## 2024-12-28 PROCEDURE — G0378 HOSPITAL OBSERVATION PER HR: HCPCS

## 2024-12-28 PROCEDURE — 36415 COLL VENOUS BLD VENIPUNCTURE: CPT

## 2024-12-28 RX ORDER — HYDROCORTISONE 25 MG/G
CREAM TOPICAL 2 TIMES DAILY
Qty: 20 G | Refills: 0 | Status: SHIPPED | OUTPATIENT
Start: 2024-12-28 | End: 2025-01-04

## 2024-12-28 RX ORDER — HYDROCODONE BITARTRATE AND ACETAMINOPHEN 5; 325 MG/1; MG/1
1 TABLET ORAL EVERY 6 HOURS PRN
Qty: 15 TABLET | Refills: 0 | Status: SHIPPED | OUTPATIENT
Start: 2024-12-28 | End: 2025-01-02

## 2024-12-28 RX ORDER — AMOXICILLIN AND CLAVULANATE POTASSIUM 875; 125 MG/1; MG/1
1 TABLET, FILM COATED ORAL EVERY 12 HOURS
Qty: 14 TABLET | Refills: 0 | Status: SHIPPED | OUTPATIENT
Start: 2024-12-28 | End: 2025-01-04

## 2024-12-28 RX ADMIN — PANTOPRAZOLE SODIUM 40 MG: 40 INJECTION, POWDER, LYOPHILIZED, FOR SOLUTION INTRAVENOUS at 08:12

## 2024-12-28 RX ADMIN — PIPERACILLIN SODIUM AND TAZOBACTAM SODIUM 4.5 G: 4; .5 INJECTION, POWDER, LYOPHILIZED, FOR SOLUTION INTRAVENOUS at 08:12

## 2024-12-28 NOTE — PROGRESS NOTES
Pt to follow up with [facility] health for PCP    Pt is clear for dc from CM       12/28/24 1002   Final Note   Assessment Type Final Discharge Note   Anticipated Discharge Disposition Home   Hospital Resources/Appts/Education Provided Post-Acute resouces added to AVS

## 2024-12-28 NOTE — CARE UPDATE
12/26/24 2048   Patient Assessment/Suction   Level of Consciousness (AVPU) alert   Respiratory Effort Normal;Unlabored   Expansion/Accessory Muscles/Retractions no retractions   All Lung Fields Breath Sounds Anterior:;equal bilaterally   Rhythm/Pattern, Respiratory pattern regular   Cough Frequency no cough   PRE-TX-O2   SpO2 98 %   Pulse 74   Resp 16   Aerosol Therapy   $ Aerosol Therapy Charges PRN treatment not required   Respiratory Treatment Status (SVN) PRN treatment not required       
   12/27/24 0750   Patient Assessment/Suction   Level of Consciousness (AVPU) alert   PRE-TX-O2   Device (Oxygen Therapy) room air   SpO2 99 %   Pulse Oximetry Type Intermittent   $ Pulse Oximetry - Single Charge Pulse Oximetry - Single   Pulse 68   Resp 16   Aerosol Therapy   $ Aerosol Therapy Charges PRN treatment not required   Respiratory Treatment Status (SVN) PRN treatment not required       
   12/27/24 1950   Patient Assessment/Suction   Level of Consciousness (AVPU) alert   Respiratory Effort Normal;Unlabored   Expansion/Accessory Muscles/Retractions no use of accessory muscles   All Lung Fields Breath Sounds equal bilaterally   Rhythm/Pattern, Respiratory pattern regular;unlabored   Cough Frequency no cough   PRE-TX-O2   Device (Oxygen Therapy) room air   SpO2 98 %   Pulse Oximetry Type Intermittent   $ Pulse Oximetry - Multiple Charge Pulse Oximetry - Multiple   Pulse 71   Resp 16   Aerosol Therapy   $ Aerosol Therapy Charges PRN treatment not required   Respiratory Treatment Status (SVN) PRN treatment not required       
   12/28/24 0739   Patient Assessment/Suction   Level of Consciousness (AVPU) alert   Respiratory Effort Unlabored   Expansion/Accessory Muscles/Retractions no use of accessory muscles;no retractions;expansion symmetric   Rhythm/Pattern, Respiratory unlabored;depth regular;pattern regular;no shortness of breath reported   PRE-TX-O2   Device (Oxygen Therapy) room air   SpO2 100 %   Pulse Oximetry Type Intermittent   $ Pulse Oximetry - Multiple Charge Pulse Oximetry - Multiple   Pulse 83   Resp 18   Aerosol Therapy   $ Aerosol Therapy Charges PRN treatment not required   Respiratory Treatment Status (SVN) PRN treatment not required       
Yes

## 2024-12-28 NOTE — PLAN OF CARE
Problem: Adult Inpatient Plan of Care  Goal: Plan of Care Review  Outcome: Met  Goal: Patient-Specific Goal (Individualized)  Outcome: Met  Goal: Absence of Hospital-Acquired Illness or Injury  Outcome: Met  Goal: Optimal Comfort and Wellbeing  Outcome: Met  Goal: Readiness for Transition of Care  Outcome: Met     Problem: Infection  Goal: Absence of Infection Signs and Symptoms  Outcome: Met   Discharge instructions given to patient. Patient verbalized complete understanding. IV discontinued with catheter intact, pressure applied to site and secured with tape. Patient tolerated well.

## 2024-12-28 NOTE — NURSING
C diff panel triggered and accepted , pt placed on isolation, formed stool sample collected. C diff algorithm completed recommending stool not to be sent and provider notified. Dea Mcdonough NP notified and c diff panel dc.

## 2024-12-28 NOTE — DISCHARGE SUMMARY
[facility]  Discharge Summary      Patient Name: Vanessa Guerra  MRN: [id number]  FABIOLA: [id number]  Patient Class: OP- Observation  Admission Date: 2024  Hospital Length of Stay: 0 days  Discharge Date and Time: 2024 11:45 AM  Attending Physician: Vickie att. providers found   Discharging Provider: Mariya Antonio MD  Primary Care Provider: Vickie, Primary Doctor    Primary Care Team: Networked reference to record PCT     HPI:   Vanessa Guerra is a 34 year old female with a previous medical history of diverticulitis, and  who presented to the ED for abdominal pain. She was discharged on  with one week of cipro and flagyl which were completed two days ago. She has been adherent to a clear liquid/bland diet and small portions. The abdominal pain did subside but returned approximately two days ago and is reported as more intense than initial presentation. Initial ED evaluation included a CT scan that showed moderate improvement in diverticulitis. CBC and CMP unremarkable and lactic acid normal. Urine studies negative for infection or occult blood. Patient admitted by hospital medicine for further evaluation and management.     * No surgery found *      Hospital Course:   34-year-old female with recent admission for diverticulitis who completed antibiotic course 2 days ago consented to the ED with abdominal pain.  CT scan showed moderate improvement in diverticulitis.  Was initiated on Zosyn.  Pain has improved.  Advancing diet.  Patient tolerated diet well.  Abdominal pain much improved.  No diarrhea.  Patient would like to go home.  Patient will be discharged.  Patient never has a colonoscopy, I will refer patient to gastroenterologist as outpatient.  If patient has a recurrent diverticulitis again, patient's may need surgical intervention- I discussed this with the patient.  We will discharge the patient with 7 days of Augmentin.  Patient is seen at  discharge they and feel appropriate for discharge.      Goals of Care Treatment Preferences:  Code Status: Full Code      SDOH Screening:  The patient declined to be screened for utility difficulties, food insecurity, transport difficulties, housing insecurity, and interpersonal safety, so no concerns could be identified this admission.     Consults:     * Diverticulitis  Pain has improved.  Continue monitoring on IV antibiotics for now.  Advance diet.  Possible discharge home tomorrow    -IV zosyn  -PRN IV analgesia  -PRN IV toradol for mild pain  -PRN IV anti-emetics  -advance diet  -LR 100ml/hour        Tobacco dependence  Dangers of cigarette smoking were reviewed with patient in detail. Patient was Counseled for 3-10 minutes. Nicotine replacement options were discussed. Nicotine replacement was discussed-  prescribed PRN while in hospital       Final Active Diagnoses:    Diagnosis Date Noted POA    PRINCIPAL PROBLEM:  Diverticulitis [K57.92] 12/05/2021 Yes    Tobacco dependence [F17.200] 12/16/2024 Yes      Problems Resolved During this Admission:       Discharged Condition: stable    Disposition: Home or Self Care    Follow Up:   Follow-up Information       [facility] Schedule an appointment as soon as possible for a visit.    Contact information:  [facility] [address] [phone]                           Patient Instructions:      Ambulatory referral/consult to Gastroenterology   Standing Status: Future   Referral Priority: Routine Referral Type: Consultation   Referral Reason: Specialty Services Required   Requested Specialty: Gastroenterology   Number of Visits Requested: 1       Significant Diagnostic Studies: Labs: CMP   Recent Labs   Lab 12/27/24  0529 12/28/24  0407    141   K 4.0 3.5   * 113*   CO2 21* 21*   GLU 95 115*   BUN 6 4*   CREATININE 0.7 0.8   CALCIUM 8.4* 8.0*   PROT 6.0 5.7*   ALBUMIN 3.1* 2.9*   BILITOT 0.3 0.2   ALKPHOS  42 41   AST 13 16   ALT 11 12   ANIONGAP 6* 7*    and CBC   Recent Labs   Lab 12/27/24  0529 12/28/24  0407   WBC 3.98 4.96   HGB 11.5* 11.1*   HCT 35.4* 33.3*    292       Pending Diagnostic Studies:       None           Medications:  Reconciled Home Medications:      Medication List        START taking these medications      amoxicillin-clavulanate 875-125mg 875-125 mg per tablet  Commonly known as: AUGMENTIN  Take 1 tablet by mouth every 12 (twelve) hours. for 7 days     HYDROcodone-acetaminophen 5-325 mg per tablet  Commonly known as: NORCO  Take 1 tablet by mouth every 6 (six) hours as needed for Pain.     hydrocortisone 2.5 % cream  Apply topically 2 (two) times daily. for 7 days            CONTINUE taking these medications      azelastine 137 mcg (0.1 %) nasal spray  Commonly known as: ASTELIN  1 spray by Nasal route 2 (two) times daily.     diclofenac 50 MG EC tablet  Commonly known as: VOLTAREN  Take 1 tablet (50 mg total) by mouth 3 (three) times daily as needed.     fluticasone propionate 50 mcg/actuation nasal spray  Commonly known as: FLONASE  2 sprays by Each Nostril route 2 (two) times daily.     ONE-A-DAY WOMENS FORMULA ORAL  Take 1 tablet by mouth once daily.     VENTOLIN HFA 90 mcg/actuation inhaler  Generic drug: albuterol  Inhale 2 puffs into the lungs every 4 (four) hours as needed.            CT Abdomen Pelvis With IV Contrast NO Oral Contrast    Result Date: 12/26/2024  EXAMINATION: CT ABDOMEN PELVIS WITH IV CONTRAST CLINICAL HISTORY: RLQ abdominal pain (Age >= 14y); TECHNIQUE: Low dose axial images, sagittal and coronal reformations were obtained from the lung bases to the pubic symphysis following the IV administration of 100 mL of Omnipaque 350 .  Oral contrast was not given. COMPARISON: 12/15/2024 FINDINGS: The liver, spleen, pancreas, kidneys and adrenal glands are unremarkable. The bowel is nondilated.  The appendix is normal. Moderate sigmoid predominant diverticulosis is  present.  There is a thickened segment of proximal sigmoid colon, accompanied by mild pericolonic fat stranding, in keeping with acute diverticulitis.  There has been moderate improvement in the degree of inflammatory change.  There is no free air or fluid collection. No significant bony abnormality.     Acute sigmoid diverticulitis with moderate improvement.  No evidence for perforation or abscess.  No new abnormality. Electronically signed by: Gildardo Tran MD Date:    12/26/2024 Time:    12:52    CT Abdomen Pelvis With IV Contrast NO Oral Contrast    Result Date: 12/15/2024  EXAMINATION: CT ABDOMEN PELVIS WITH IV CONTRAST CLINICAL HISTORY: Flank pain, kidney stone suspected;Nausea/vomiting;RLQ abdominal pain (Age >= 14y); TECHNIQUE: Axial CT images with sagittal and coronal reformats were obtained of the abdomen and pelvis from the hemidiaphragms through the symphysis pubis after the administration of 100mL Omnipaque 350. COMPARISON: CT of the abdomen and pelvis from 11/02/2023. FINDINGS: Lung Bases: Clear. Heart: Heart size is normal.  No pericardial effusion. Liver: The liver is enlarged.  There are no focal hepatic lesions.  The portal vasculature is patent. Biliary tract: No intrahepatic or extrahepatic biliary ductal dilatation. Gallbladder: No radiodense gallstone. No wall thickening or pericholecystic fluid. Pancreas: Normal. No pancreatic ductal dilatation. Spleen: Normal size without focal lesion. Adrenals: Unremarkable. Kidneys and urinary collecting systems: Normal.  No hydronephrosis or urolithiasis. Lymph nodes: None enlarged. Stomach and bowel: The stomach is normal.  There is wall thickening of the sigmoid colon with colonic diverticula, compatible with acute, uncomplicated diverticulitis.  There is no evidence of an adjacent abscess or gross free intraperitoneal air.  The appendix is normal.  There is no bowel obstruction. Peritoneum and mesentery: No ascites or free intraperitoneal air.  No  abdominal fluid collection. Vasculature: No aneurysm or significant atherosclerosis. Urinary bladder: No wall thickening. Reproductive organs: The uterus and adnexae are unremarkable.  There is trace free fluid in pelvis which may be physiologic or may be reactive. Body wall: No abnormality. Musculoskeletal: No aggressive osseous lesion.     Acute, uncomplicated diverticulitis of the sigmoid colon. Electronically signed by: Oniel Balderrama Date:    12/15/2024 Time:    01:45  - pulls last radiology orders      Indwelling Lines/Drains at time of discharge:   Lines/Drains/Airways       None                   Time spent on the discharge of patient: 35 minutes         Mariya Antonio MD  Department of Hospital Medicine  [facility]/Surg

## 2024-12-28 NOTE — DISCHARGE SUMMARY
[facility] Medicine  Discharge Summary      Patient Name: Vanessa Guerra  MRN: [id number]  FABIOLA: [id number]  Patient Class: OP- Observation  Admission Date: 2024  Hospital Length of Stay: 0 days  Discharge Date and Time: 2024 11:45 AM  Attending Physician: Vickie att. providers found   Discharging Provider: Mariya Antonio MD  Primary Care Provider: Vickie, Primary Doctor    Primary Care Team: Networked reference to record PCT     HPI:   Vanessa Guerra is a 34 year old female with a previous medical history of diverticulitis, and  who presented to the ED for abdominal pain. She was discharged on  with one week of cipro and flagyl which were completed two days ago. She has been adherent to a clear liquid/bland diet and small portions. The abdominal pain did subside but returned approximately two days ago and is reported as more intense than initial presentation. Initial ED evaluation included a CT scan that showed moderate improvement in diverticulitis. CBC and CMP unremarkable and lactic acid normal. Urine studies negative for infection or occult blood. Patient admitted by hospital medicine for further evaluation and management.     * No surgery found *      Hospital Course:   34-year-old female with recent admission for diverticulitis who completed antibiotic course 2 days ago consented to the ED with abdominal pain.  CT scan showed moderate improvement in diverticulitis.  Was initiated on Zosyn.  Pain has improved.  Advancing diet.  Patient tolerated diet well.  Abdominal pain much improved.  No diarrhea.  Patient would like to go home.  Patient will be discharged.  Patient never has a colonoscopy, I will refer patient to gastroenterologist as outpatient.  If patient has a recurrent diverticulitis again, patient's may need surgical intervention- I discussed this with the patient.  We will discharge the patient with 7 days of Augmentin.  Patient is seen at  discharge they and feel appropriate for discharge.      Goals of Care Treatment Preferences:  Code Status: Full Code      SDOH Screening:  The patient declined to be screened for utility difficulties, food insecurity, transport difficulties, housing insecurity, and interpersonal safety, so no concerns could be identified this admission.     Consults:     * Diverticulitis  Pain has improved.  Continue monitoring on IV antibiotics for now.  Advance diet.  Possible discharge home tomorrow    -IV zosyn  -PRN IV analgesia  -PRN IV toradol for mild pain  -PRN IV anti-emetics  -advance diet  -LR 100ml/hour        Tobacco dependence  Dangers of cigarette smoking were reviewed with patient in detail. Patient was Counseled for 3-10 minutes. Nicotine replacement options were discussed. Nicotine replacement was discussed-  prescribed PRN while in hospital       Final Active Diagnoses:    Diagnosis Date Noted POA    PRINCIPAL PROBLEM:  Diverticulitis [K57.92] 12/05/2021 Yes    Tobacco dependence [F17.200] 12/16/2024 Yes      Problems Resolved During this Admission:       Discharged Condition: stable    Disposition: Home or Self Care    Follow Up:   Follow-up Information       [facility] Schedule an appointment as soon as possible for a visit.    Contact information:  [facility] [id number] [phone]                           Patient Instructions:      Ambulatory referral/consult to Gastroenterology   Standing Status: Future   Referral Priority: Routine Referral Type: Consultation   Referral Reason: Specialty Services Required   Requested Specialty: Gastroenterology   Number of Visits Requested: 1       Significant Diagnostic Studies: Labs: CMP   Recent Labs   Lab 12/27/24  0529 12/28/24  0407    141   K 4.0 3.5   * 113*   CO2 21* 21*   GLU 95 115*   BUN 6 4*   CREATININE 0.7 0.8   CALCIUM 8.4* 8.0*   PROT 6.0 5.7*   ALBUMIN 3.1* 2.9*   BILITOT 0.3 0.2   ALKPHOS  42 41   AST 13 16   ALT 11 12   ANIONGAP 6* 7*    and CBC   Recent Labs   Lab 12/27/24  0529 12/28/24  0407   WBC 3.98 4.96   HGB 11.5* 11.1*   HCT 35.4* 33.3*    292       Pending Diagnostic Studies:       None           Medications:  Reconciled Home Medications:      Medication List        START taking these medications      amoxicillin-clavulanate 875-125mg 875-125 mg per tablet  Commonly known as: AUGMENTIN  Take 1 tablet by mouth every 12 (twelve) hours. for 7 days     HYDROcodone-acetaminophen 5-325 mg per tablet  Commonly known as: NORCO  Take 1 tablet by mouth every 6 (six) hours as needed for Pain.     hydrocortisone 2.5 % cream  Apply topically 2 (two) times daily. for 7 days            CONTINUE taking these medications      azelastine 137 mcg (0.1 %) nasal spray  Commonly known as: ASTELIN  1 spray by Nasal route 2 (two) times daily.     diclofenac 50 MG EC tablet  Commonly known as: VOLTAREN  Take 1 tablet (50 mg total) by mouth 3 (three) times daily as needed.     fluticasone propionate 50 mcg/actuation nasal spray  Commonly known as: FLONASE  2 sprays by Each Nostril route 2 (two) times daily.     ONE-A-DAY WOMENS FORMULA ORAL  Take 1 tablet by mouth once daily.     VENTOLIN HFA 90 mcg/actuation inhaler  Generic drug: albuterol  Inhale 2 puffs into the lungs every 4 (four) hours as needed.            CT Abdomen Pelvis With IV Contrast NO Oral Contrast    Result Date: 12/26/2024  EXAMINATION: CT ABDOMEN PELVIS WITH IV CONTRAST CLINICAL HISTORY: RLQ abdominal pain (Age >= 14y); TECHNIQUE: Low dose axial images, sagittal and coronal reformations were obtained from the lung bases to the pubic symphysis following the IV administration of 100 mL of Omnipaque 350 .  Oral contrast was not given. COMPARISON: 12/15/2024 FINDINGS: The liver, spleen, pancreas, kidneys and adrenal glands are unremarkable. The bowel is nondilated.  The appendix is normal. Moderate sigmoid predominant diverticulosis is  present.  There is a thickened segment of proximal sigmoid colon, accompanied by mild pericolonic fat stranding, in keeping with acute diverticulitis.  There has been moderate improvement in the degree of inflammatory change.  There is no free air or fluid collection. No significant bony abnormality.     Acute sigmoid diverticulitis with moderate improvement.  No evidence for perforation or abscess.  No new abnormality. Electronically signed by: Gildardo Tran MD Date:    12/26/2024 Time:    12:52    CT Abdomen Pelvis With IV Contrast NO Oral Contrast    Result Date: 12/15/2024  EXAMINATION: CT ABDOMEN PELVIS WITH IV CONTRAST CLINICAL HISTORY: Flank pain, kidney stone suspected;Nausea/vomiting;RLQ abdominal pain (Age >= 14y); TECHNIQUE: Axial CT images with sagittal and coronal reformats were obtained of the abdomen and pelvis from the hemidiaphragms through the symphysis pubis after the administration of 100mL Omnipaque 350. COMPARISON: CT of the abdomen and pelvis from 11/02/2023. FINDINGS: Lung Bases: Clear. Heart: Heart size is normal.  No pericardial effusion. Liver: The liver is enlarged.  There are no focal hepatic lesions.  The portal vasculature is patent. Biliary tract: No intrahepatic or extrahepatic biliary ductal dilatation. Gallbladder: No radiodense gallstone. No wall thickening or pericholecystic fluid. Pancreas: Normal. No pancreatic ductal dilatation. Spleen: Normal size without focal lesion. Adrenals: Unremarkable. Kidneys and urinary collecting systems: Normal.  No hydronephrosis or urolithiasis. Lymph nodes: None enlarged. Stomach and bowel: The stomach is normal.  There is wall thickening of the sigmoid colon with colonic diverticula, compatible with acute, uncomplicated diverticulitis.  There is no evidence of an adjacent abscess or gross free intraperitoneal air.  The appendix is normal.  There is no bowel obstruction. Peritoneum and mesentery: No ascites or free intraperitoneal air.  No  abdominal fluid collection. Vasculature: No aneurysm or significant atherosclerosis. Urinary bladder: No wall thickening. Reproductive organs: The uterus and adnexae are unremarkable.  There is trace free fluid in pelvis which may be physiologic or may be reactive. Body wall: No abnormality. Musculoskeletal: No aggressive osseous lesion.     Acute, uncomplicated diverticulitis of the sigmoid colon. Electronically signed by: Oniel Balderrama Date:    12/15/2024 Time:    01:45  - pulls last radiology orders    Indwelling Lines/Drains at time of discharge:   Lines/Drains/Airways       None                   Time spent on the discharge of patient: 35 minutes         Mariya Antonio MD  Department of Hospital Medicine  [facility]/Surg

## 2024-12-28 NOTE — PLAN OF CARE
Problem: Adult Inpatient Plan of Care  Goal: Plan of Care Review  Outcome: Progressing     Problem: Adult Inpatient Plan of Care  Goal: Optimal Comfort and Wellbeing  Outcome: Progressing   Care plan and meds reviewed. Pt verbalized understanding. VSS throughout shift. Pt on room air. IV Abx administered. Standby assist to BR. IS at bedside, instructed on proper use, return demonstration performed.  Repositions self. Safety maintained. Call light within reach. No complaints at this time. Will continue to monitor.

## 2025-01-03 ENCOUNTER — TELEPHONE (OUTPATIENT)
Dept: GASTROENTEROLOGY | Facility: CLINIC | Age: 35
End: 2025-01-03
Payer: MEDICAID

## 2025-01-03 NOTE — TELEPHONE ENCOUNTER
Office visit scheduled for 02/13/2025    ----- Message from Tim sent at 1/3/2025  1:56 PM CST -----  Contact: Patient    ----- Message -----  From: Belkis Duke LPN  Sent: 1/3/2025   1:29 PM CST  To: Tim Mueller    We are not accepting any new medicaid at this time. Our medicaid panel is full.  ----- Message -----  From: Tim Mueller  Sent: 1/3/2025   1:26 PM CST  To: [facility] Clinical Staff; #    Type: Patient Call          Who Called: Patient      Does the patient know what this is regarding?  Patient is requesting a call back to get scheduled. Please advise          Does the patient rather a call back or a response via [facility]? call        Best Call Back Number: [phone]         Additional Information:

## 2025-07-19 ENCOUNTER — HOSPITAL ENCOUNTER (EMERGENCY)
Facility: HOSPITAL | Age: 35
Discharge: HOME | End: 2025-07-19
Attending: EMERGENCY MEDICINE

## 2025-07-19 VITALS
HEIGHT: 66 IN | DIASTOLIC BLOOD PRESSURE: 71 MMHG | SYSTOLIC BLOOD PRESSURE: 143 MMHG | HEART RATE: 90 BPM | BODY MASS INDEX: 31.18 KG/M2 | WEIGHT: 194 LBS | RESPIRATION RATE: 18 BRPM | TEMPERATURE: 98 F | OXYGEN SATURATION: 100 %

## 2025-07-19 DIAGNOSIS — K02.9 PAIN DUE TO DENTAL CARIES: Primary | ICD-10-CM

## 2025-07-19 LAB
B-HCG UR QL: NEGATIVE
CTP QC/QA: YES

## 2025-07-19 PROCEDURE — 96372 THER/PROPH/DIAG INJ SC/IM: CPT | Performed by: EMERGENCY MEDICINE

## 2025-07-19 PROCEDURE — 99284 EMERGENCY DEPT VISIT MOD MDM: CPT | Mod: 25

## 2025-07-19 PROCEDURE — 63600175 PHARM REV CODE 636 W HCPCS: Mod: TB,JZ | Performed by: EMERGENCY MEDICINE

## 2025-07-19 PROCEDURE — 81025 URINE PREGNANCY TEST: CPT | Performed by: EMERGENCY MEDICINE

## 2025-07-19 RX ORDER — AMOXICILLIN 875 MG/1
875 TABLET, COATED ORAL 2 TIMES DAILY
Qty: 20 TABLET | Refills: 0 | Status: SHIPPED | OUTPATIENT
Start: 2025-07-19

## 2025-07-19 RX ORDER — KETOROLAC TROMETHAMINE 30 MG/ML
60 INJECTION, SOLUTION INTRAMUSCULAR; INTRAVENOUS
Status: COMPLETED | OUTPATIENT
Start: 2025-07-19 | End: 2025-07-19

## 2025-07-19 RX ORDER — IBUPROFEN 600 MG/1
600 TABLET, FILM COATED ORAL EVERY 6 HOURS PRN
Qty: 20 TABLET | Refills: 0 | Status: SHIPPED | OUTPATIENT
Start: 2025-07-19

## 2025-07-19 RX ORDER — HYDROCODONE BITARTRATE AND ACETAMINOPHEN 5; 325 MG/1; MG/1
1 TABLET ORAL EVERY 6 HOURS PRN
Qty: 12 TABLET | Refills: 0 | Status: SHIPPED | OUTPATIENT
Start: 2025-07-19

## 2025-07-19 RX ADMIN — KETOROLAC TROMETHAMINE 60 MG: 30 INJECTION, SOLUTION INTRAMUSCULAR; INTRAVENOUS at 03:07

## 2025-07-19 NOTE — ED PROVIDER NOTES
Encounter Date: 2025       History     Chief Complaint   Patient presents with    Dental Pain     Filling fell out couple of days ago. Pain to right side of jaw. Face currently numb because pt used numbing medication to help with pain.        34-year-old female complaining of right upper posterior tooth pain.  Began a few days ago.  It is severe and worsening.  Not relieved with OTC Tylenol or numbing gel.  Denies any facial swelling.    The history is provided by the patient.     Review of patient's allergies indicates:  No Known Allergies  Past Medical History:   Diagnosis Date    Diverticulosis     No pertinent past medical history      Past Surgical History:   Procedure Laterality Date     SECTION      SKIN GRAFT      UMBILICAL HERNIA REPAIR N/A 2022    Procedure: REPAIR, HERNIA, UMBILICAL, AGE 5 YEARS OR OLDER;  Surgeon: Shahram Henderson III, MD;  Location: [facility] OR;  Service: General;  Laterality: N/A;     Family History   Problem Relation Name Age of Onset    Diabetes Mother      Hypertension Mother       Social History[1]  Review of Systems   HENT:  Positive for dental problem.    All other systems reviewed and are negative.      Physical Exam     Initial Vitals [25 0303]   BP Pulse Resp Temp SpO2   (!) 143/71 90 18 98.2 °F (36.8 °C) 100 %      MAP       --         Physical Exam    Nursing note and vitals reviewed.  Constitutional: She appears well-developed and well-nourished. She is not diaphoretic. No distress.   HENT:   Head: Normocephalic.   Right upper posterior molar is decayed with tenderness.  No facial swelling or trismus.   Eyes: Conjunctivae are normal.   Neck: Neck supple.   Normal range of motion.  Cardiovascular:  Normal rate.           Pulmonary/Chest: No respiratory distress.   Abdominal: She exhibits no distension.   Musculoskeletal:         General: No edema.      Cervical back: Normal range of motion and neck supple.     Neurological: She is alert. She has normal  strength.   Skin: Skin is warm and dry.   Psychiatric: She has a normal mood and affect.         ED Course   Procedures  Labs Reviewed   POCT URINE PREGNANCY          Imaging Results    None          Medications   ketorolac injection 60 mg (has no administration in time range)     Medical Decision Making  Dental pain.  No evidence for significant abscess.  Discharge with antibiotic and pain medication, stressed the need for dental follow up.    Amount and/or Complexity of Data Reviewed  Labs: ordered.    Risk  Prescription drug management.                                          Clinical Impression:  Final diagnoses:  [K02.9] Pain due to dental caries (Primary)          ED Disposition Condition    Discharge Stable          ED Prescriptions       Medication Sig Dispense Start Date End Date Auth. Provider    HYDROcodone-acetaminophen (NORCO) 5-325 mg per tablet Take 1 tablet by mouth every 6 (six) hours as needed for Pain. 12 tablet 7/19/2025 -- Telly Flores MD    amoxicillin (AMOXIL) 875 MG tablet Take 1 tablet (875 mg total) by mouth 2 (two) times daily. 20 tablet 7/19/2025 -- Telly Flores MD    ibuprofen (ADVIL,MOTRIN) 600 MG tablet Take 1 tablet (600 mg total) by mouth every 6 (six) hours as needed for Pain. 20 tablet 7/19/2025 -- Telly Flores MD          Follow-up Information       Follow up With Specialties Details Why Contact Info    dentist                       [1]   Social History  Tobacco Use    Smoking status: Every Day     Current packs/day: 0.25     Types: Cigarettes    Smokeless tobacco: Never   Substance Use Topics    Alcohol use: Yes     Comment: occ    Drug use: Never        Telly Flores MD  07/19/25 [id number]

## (undated) DEVICE — TRAY GENERAL SURGERY

## (undated) DEVICE — SUTURE VICRYL 3-0 18 VCP774D

## (undated) DEVICE — SUTURE PDS PLUS 0 L60IN ABSORBABLE VIOLET CTX L48MM 1/2 CIRC

## (undated) DEVICE — GLOVE BIOGEL MICRO SURGEON PINK SZ 7.5

## (undated) DEVICE — SOLUTION IRRI NS BOTTLE 1000ML R5200-01

## (undated) DEVICE — SUTURE SILK 3-0 30 A304H

## (undated) DEVICE — SUTURE ETHIBOND 0 MO-6 18 CX45D

## (undated) DEVICE — UNDERGLOVE BIOGEL PI MICRO BLUE SZ 8

## (undated) DEVICE — DERMABOND HVD MINI  DHVM12

## (undated) DEVICE — SUTURE MONOCRYL 4-0 PS-2 27 MCP426H

## (undated) DEVICE — SYRINGE HYPODERMC LL 22G 1.5 ECLIPSE 30

## (undated) DEVICE — PAD BOVIE ADULT

## (undated) DEVICE — DRAPE WARMER ORS-100

## (undated) DEVICE — SUTURE SILK 2-0 30 A305H